# Patient Record
Sex: MALE | Race: BLACK OR AFRICAN AMERICAN | NOT HISPANIC OR LATINO | Employment: OTHER | URBAN - METROPOLITAN AREA
[De-identification: names, ages, dates, MRNs, and addresses within clinical notes are randomized per-mention and may not be internally consistent; named-entity substitution may affect disease eponyms.]

---

## 2017-07-11 ENCOUNTER — TELEPHONE (OUTPATIENT)
Dept: RHEUMATOLOGY | Facility: CLINIC | Age: 67
End: 2017-07-11

## 2017-07-11 DIAGNOSIS — I61.9 BRAIN BLEED: Primary | ICD-10-CM

## 2017-07-11 DIAGNOSIS — R41.3 MEMORY LOSS: ICD-10-CM

## 2017-07-18 ENCOUNTER — TELEPHONE (OUTPATIENT)
Dept: RHEUMATOLOGY | Facility: CLINIC | Age: 67
End: 2017-07-18

## 2017-07-18 ENCOUNTER — TELEPHONE (OUTPATIENT)
Dept: NEUROLOGY | Facility: CLINIC | Age: 67
End: 2017-07-18

## 2017-07-18 NOTE — TELEPHONE ENCOUNTER
----- Message from Josue Beckwith sent at 7/18/2017  9:36 AM CDT -----  Contact: Wife,Elvia Gan want to speak with a nurse regarding this week patient had stroke in May was unable to schedule appointment please call back at [phone]

## 2017-07-18 NOTE — TELEPHONE ENCOUNTER
----- Message from Josue Beckwith sent at 7/18/2017  9:37 AM CDT -----  Contact: Wife,Elvia Gan want to speak with a nurse regarding patient test results please call back at [phone] (home)

## 2017-07-27 ENCOUNTER — TELEPHONE (OUTPATIENT)
Dept: NEUROLOGY | Facility: CLINIC | Age: 67
End: 2017-07-27

## 2017-07-27 NOTE — TELEPHONE ENCOUNTER
Tried to call patient in regards to appointment scheduled with Dr. Root.     Dr. Root does not treat memory loss. Patient needs to be rescheduled with correct provider.

## 2017-07-28 NOTE — TELEPHONE ENCOUNTER
Tried to call patient. No answer, left message to call back in regards to his appointment. Dr. Root does not treat memory loss and needs to be rescheduled with correct provider.

## 2017-07-31 NOTE — TELEPHONE ENCOUNTER
Called and spoke with Patients wife, Elvia. Informed her that Dr. Root does not treat memory loss. That I could reschedule her  with Bernadette Granda who specializes in memory loss. Patient's wife verbalized understanding. Appointment rescheduled with Bernadette Granda.

## 2017-08-18 ENCOUNTER — HOSPITAL ENCOUNTER (INPATIENT)
Facility: HOSPITAL | Age: 67
LOS: 7 days | Discharge: NURSING, REHAB OR LONG-TERM CARE | DRG: 637 | End: 2017-08-25
Attending: INTERNAL MEDICINE | Admitting: INTERNAL MEDICINE
Payer: COMMERCIAL

## 2017-08-18 DIAGNOSIS — I42.9 CARDIOMYOPATHY: ICD-10-CM

## 2017-08-18 DIAGNOSIS — G93.40 ENCEPHALOPATHY: ICD-10-CM

## 2017-08-18 DIAGNOSIS — E11.10 DIABETIC KETOACIDOSIS: ICD-10-CM

## 2017-08-18 DIAGNOSIS — Z79.4 UNCONTROLLED TYPE 2 DIABETES MELLITUS WITH KETOACIDOSIS WITHOUT COMA, WITH LONG-TERM CURRENT USE OF INSULIN: Primary | ICD-10-CM

## 2017-08-18 DIAGNOSIS — E11.10 UNCONTROLLED TYPE 2 DIABETES MELLITUS WITH KETOACIDOSIS WITHOUT COMA, WITH LONG-TERM CURRENT USE OF INSULIN: Primary | ICD-10-CM

## 2017-08-18 DIAGNOSIS — G93.41 ACUTE METABOLIC ENCEPHALOPATHY: ICD-10-CM

## 2017-08-18 DIAGNOSIS — E11.10 DKA, TYPE 2: ICD-10-CM

## 2017-08-18 LAB
INR PPP: 1.1
PROTHROMBIN TIME: 11.3 SEC

## 2017-08-18 PROCEDURE — 84484 ASSAY OF TROPONIN QUANT: CPT

## 2017-08-18 PROCEDURE — 80053 COMPREHEN METABOLIC PANEL: CPT

## 2017-08-18 PROCEDURE — 85025 COMPLETE CBC W/AUTO DIFF WBC: CPT

## 2017-08-18 PROCEDURE — 83880 ASSAY OF NATRIURETIC PEPTIDE: CPT

## 2017-08-18 PROCEDURE — 83735 ASSAY OF MAGNESIUM: CPT

## 2017-08-18 PROCEDURE — 20000000 HC ICU ROOM

## 2017-08-18 PROCEDURE — 87205 SMEAR GRAM STAIN: CPT

## 2017-08-18 PROCEDURE — 81001 URINALYSIS AUTO W/SCOPE: CPT

## 2017-08-18 PROCEDURE — 83605 ASSAY OF LACTIC ACID: CPT

## 2017-08-18 PROCEDURE — 85610 PROTHROMBIN TIME: CPT

## 2017-08-18 PROCEDURE — 84100 ASSAY OF PHOSPHORUS: CPT

## 2017-08-18 PROCEDURE — 83036 HEMOGLOBIN GLYCOSYLATED A1C: CPT

## 2017-08-18 PROCEDURE — 87040 BLOOD CULTURE FOR BACTERIA: CPT

## 2017-08-18 PROCEDURE — 87086 URINE CULTURE/COLONY COUNT: CPT

## 2017-08-18 RX ORDER — HYDROCHLOROTHIAZIDE 25 MG/1
25 TABLET ORAL DAILY
Status: CANCELLED | OUTPATIENT
Start: 2017-08-19

## 2017-08-18 RX ORDER — PANTOPRAZOLE SODIUM 40 MG/1
40 TABLET, DELAYED RELEASE ORAL DAILY
Status: CANCELLED | OUTPATIENT
Start: 2017-08-19

## 2017-08-18 RX ORDER — SIMVASTATIN 10 MG/1
20 TABLET, FILM COATED ORAL NIGHTLY
Status: CANCELLED | OUTPATIENT
Start: 2017-08-18

## 2017-08-18 RX ORDER — ACETAMINOPHEN 325 MG/1
650 TABLET ORAL EVERY 4 HOURS PRN
Status: DISCONTINUED | OUTPATIENT
Start: 2017-08-19 | End: 2017-08-25 | Stop reason: HOSPADM

## 2017-08-18 RX ORDER — HEPARIN SODIUM 5000 [USP'U]/ML
5000 INJECTION, SOLUTION INTRAVENOUS; SUBCUTANEOUS EVERY 8 HOURS
Status: DISCONTINUED | OUTPATIENT
Start: 2017-08-19 | End: 2017-08-25 | Stop reason: HOSPADM

## 2017-08-18 RX ORDER — ONDANSETRON 8 MG/1
8 TABLET, ORALLY DISINTEGRATING ORAL EVERY 8 HOURS PRN
Status: DISCONTINUED | OUTPATIENT
Start: 2017-08-19 | End: 2017-08-22

## 2017-08-18 RX ORDER — LISINOPRIL 20 MG/1
20 TABLET ORAL DAILY
Status: CANCELLED | OUTPATIENT
Start: 2017-08-19

## 2017-08-18 RX ORDER — SODIUM CHLORIDE 0.9 % (FLUSH) 0.9 %
3 SYRINGE (ML) INJECTION EVERY 8 HOURS
Status: DISCONTINUED | OUTPATIENT
Start: 2017-08-19 | End: 2017-08-25 | Stop reason: HOSPADM

## 2017-08-19 PROBLEM — I25.10 CAD (CORONARY ARTERY DISEASE): Status: ACTIVE | Noted: 2017-08-19

## 2017-08-19 PROBLEM — E11.10 UNCONTROLLED TYPE 2 DIABETES MELLITUS WITH KETOACIDOSIS WITHOUT COMA, WITH LONG-TERM CURRENT USE OF INSULIN: Status: ACTIVE | Noted: 2017-08-19

## 2017-08-19 PROBLEM — N18.30 CKD (CHRONIC KIDNEY DISEASE) STAGE 3, GFR 30-59 ML/MIN: Status: ACTIVE | Noted: 2017-08-19

## 2017-08-19 PROBLEM — I50.42 CHRONIC COMBINED SYSTOLIC AND DIASTOLIC HEART FAILURE: Status: ACTIVE | Noted: 2017-08-19

## 2017-08-19 PROBLEM — G93.41 ACUTE METABOLIC ENCEPHALOPATHY: Status: ACTIVE | Noted: 2017-08-19

## 2017-08-19 PROBLEM — I50.22 CHRONIC SYSTOLIC HEART FAILURE: Status: ACTIVE | Noted: 2017-08-19

## 2017-08-19 PROBLEM — Z79.4 UNCONTROLLED TYPE 2 DIABETES MELLITUS WITH KETOACIDOSIS WITHOUT COMA, WITH LONG-TERM CURRENT USE OF INSULIN: Status: ACTIVE | Noted: 2017-08-19

## 2017-08-19 PROBLEM — D72.829 LEUKOCYTOSIS: Status: ACTIVE | Noted: 2017-08-19

## 2017-08-19 PROBLEM — E87.29 HIGH ANION GAP METABOLIC ACIDOSIS: Status: ACTIVE | Noted: 2017-08-19

## 2017-08-19 PROBLEM — I10 ESSENTIAL HYPERTENSION: Status: ACTIVE | Noted: 2017-08-19

## 2017-08-19 PROBLEM — R79.89 ELEVATED TROPONIN: Status: ACTIVE | Noted: 2017-08-19

## 2017-08-19 PROBLEM — F10.90 ALCOHOL INTAKE ABOVE RECOMMENDED SENSIBLE LIMITS: Status: ACTIVE | Noted: 2017-08-19

## 2017-08-19 PROBLEM — I61.2: Status: ACTIVE | Noted: 2017-08-19

## 2017-08-19 LAB
ALBUMIN SERPL BCP-MCNC: 1.9 G/DL
ALBUMIN SERPL BCP-MCNC: 2 G/DL
ALBUMIN SERPL BCP-MCNC: 2 G/DL
ALLENS TEST: ABNORMAL
ALP SERPL-CCNC: 112 U/L
ALP SERPL-CCNC: 128 U/L
ALP SERPL-CCNC: 133 U/L
ALT SERPL W/O P-5'-P-CCNC: 27 U/L
ALT SERPL W/O P-5'-P-CCNC: 30 U/L
ALT SERPL W/O P-5'-P-CCNC: 32 U/L
ANION GAP SERPL CALC-SCNC: 10 MMOL/L
ANION GAP SERPL CALC-SCNC: 10 MMOL/L
ANION GAP SERPL CALC-SCNC: 11 MMOL/L
ANION GAP SERPL CALC-SCNC: 11 MMOL/L
ANION GAP SERPL CALC-SCNC: 12 MMOL/L
ANION GAP SERPL CALC-SCNC: 15 MMOL/L
ANION GAP SERPL CALC-SCNC: 17 MMOL/L
ANION GAP SERPL CALC-SCNC: 17 MMOL/L
AST SERPL-CCNC: 23 U/L
AST SERPL-CCNC: 24 U/L
AST SERPL-CCNC: 28 U/L
B-OH-BUTYR BLD STRIP-SCNC: 1.3 MMOL/L
B-OH-BUTYR BLD STRIP-SCNC: 5.9 MMOL/L
BACTERIA #/AREA URNS AUTO: ABNORMAL /HPF
BASOPHILS # BLD AUTO: 0.03 K/UL
BASOPHILS # BLD AUTO: 0.04 K/UL
BASOPHILS NFR BLD: 0.2 %
BASOPHILS NFR BLD: 0.3 %
BILIRUB SERPL-MCNC: 0.3 MG/DL
BILIRUB UR QL STRIP: NEGATIVE
BNP SERPL-MCNC: 367 PG/ML
BUN SERPL-MCNC: 17 MG/DL
BUN SERPL-MCNC: 17 MG/DL
BUN SERPL-MCNC: 19 MG/DL
BUN SERPL-MCNC: 20 MG/DL
BUN SERPL-MCNC: 20 MG/DL
C PEPTIDE SERPL-MCNC: <0.08 NG/ML
CALCIUM SERPL-MCNC: 9 MG/DL
CALCIUM SERPL-MCNC: 9.1 MG/DL
CALCIUM SERPL-MCNC: 9.3 MG/DL
CALCIUM SERPL-MCNC: 9.3 MG/DL
CALCIUM SERPL-MCNC: 9.5 MG/DL
CALCIUM SERPL-MCNC: 9.6 MG/DL
CALCIUM SERPL-MCNC: 9.6 MG/DL
CALCIUM SERPL-MCNC: 9.7 MG/DL
CHLORIDE SERPL-SCNC: 115 MMOL/L
CHLORIDE SERPL-SCNC: 116 MMOL/L
CHLORIDE SERPL-SCNC: 116 MMOL/L
CHLORIDE SERPL-SCNC: 117 MMOL/L
CLARITY UR REFRACT.AUTO: ABNORMAL
CO2 SERPL-SCNC: 11 MMOL/L
CO2 SERPL-SCNC: 11 MMOL/L
CO2 SERPL-SCNC: 13 MMOL/L
CO2 SERPL-SCNC: 15 MMOL/L
CO2 SERPL-SCNC: 17 MMOL/L
COLOR UR AUTO: YELLOW
CORTIS SERPL-MCNC: 11.7 UG/DL
CREAT SERPL-MCNC: 1.6 MG/DL
CREAT SERPL-MCNC: 1.7 MG/DL
CREAT SERPL-MCNC: 1.8 MG/DL
CREAT SERPL-MCNC: 1.8 MG/DL
CREAT SERPL-MCNC: 1.9 MG/DL
CREAT SERPL-MCNC: 1.9 MG/DL
CREAT SERPL-MCNC: 2 MG/DL
CREAT SERPL-MCNC: 2 MG/DL
CREAT UR-MCNC: 47 MG/DL
DELSYS: ABNORMAL
DIFFERENTIAL METHOD: ABNORMAL
DIFFERENTIAL METHOD: ABNORMAL
EOSINOPHIL # BLD AUTO: 0.1 K/UL
EOSINOPHIL # BLD AUTO: 0.1 K/UL
EOSINOPHIL NFR BLD: 0.6 %
EOSINOPHIL NFR BLD: 0.9 %
ERYTHROCYTE [DISTWIDTH] IN BLOOD BY AUTOMATED COUNT: 14.5 %
ERYTHROCYTE [DISTWIDTH] IN BLOOD BY AUTOMATED COUNT: 14.6 %
EST. GFR  (AFRICAN AMERICAN): 38.8 ML/MIN/1.73 M^2
EST. GFR  (AFRICAN AMERICAN): 38.8 ML/MIN/1.73 M^2
EST. GFR  (AFRICAN AMERICAN): 41.3 ML/MIN/1.73 M^2
EST. GFR  (AFRICAN AMERICAN): 41.3 ML/MIN/1.73 M^2
EST. GFR  (AFRICAN AMERICAN): 44 ML/MIN/1.73 M^2
EST. GFR  (AFRICAN AMERICAN): 44 ML/MIN/1.73 M^2
EST. GFR  (AFRICAN AMERICAN): 47.2 ML/MIN/1.73 M^2
EST. GFR  (AFRICAN AMERICAN): 50.8 ML/MIN/1.73 M^2
EST. GFR  (NON AFRICAN AMERICAN): 33.5 ML/MIN/1.73 M^2
EST. GFR  (NON AFRICAN AMERICAN): 33.5 ML/MIN/1.73 M^2
EST. GFR  (NON AFRICAN AMERICAN): 35.7 ML/MIN/1.73 M^2
EST. GFR  (NON AFRICAN AMERICAN): 35.7 ML/MIN/1.73 M^2
EST. GFR  (NON AFRICAN AMERICAN): 38.1 ML/MIN/1.73 M^2
EST. GFR  (NON AFRICAN AMERICAN): 38.1 ML/MIN/1.73 M^2
EST. GFR  (NON AFRICAN AMERICAN): 40.8 ML/MIN/1.73 M^2
EST. GFR  (NON AFRICAN AMERICAN): 43.9 ML/MIN/1.73 M^2
ESTIMATED AVG GLUCOSE: 240 MG/DL
FIO2: 21
GLUCOSE SERPL-MCNC: 223 MG/DL
GLUCOSE SERPL-MCNC: 243 MG/DL
GLUCOSE SERPL-MCNC: 243 MG/DL
GLUCOSE SERPL-MCNC: 286 MG/DL
GLUCOSE SERPL-MCNC: 308 MG/DL
GLUCOSE SERPL-MCNC: 319 MG/DL
GLUCOSE SERPL-MCNC: 363 MG/DL
GLUCOSE SERPL-MCNC: 363 MG/DL
GLUCOSE UR QL STRIP: ABNORMAL
GRAM STN SPEC: NORMAL
GRAM STN SPEC: NORMAL
HBA1C MFR BLD HPLC: 10 %
HCO3 UR-SCNC: 14.8 MMOL/L (ref 24–28)
HCT VFR BLD AUTO: 35 %
HCT VFR BLD AUTO: 37.2 %
HGB BLD-MCNC: 11.7 G/DL
HGB BLD-MCNC: 12.5 G/DL
HGB UR QL STRIP: ABNORMAL
HYALINE CASTS UR QL AUTO: 0 /LPF
KETONES UR QL STRIP: ABNORMAL
LACTATE SERPL-SCNC: 1 MMOL/L
LACTATE SERPL-SCNC: 1 MMOL/L
LEUKOCYTE ESTERASE UR QL STRIP: NEGATIVE
LYMPHOCYTES # BLD AUTO: 1 K/UL
LYMPHOCYTES # BLD AUTO: 1.2 K/UL
LYMPHOCYTES NFR BLD: 7.2 %
LYMPHOCYTES NFR BLD: 8.3 %
MAGNESIUM SERPL-MCNC: 1.2 MG/DL
MAGNESIUM SERPL-MCNC: 1.7 MG/DL
MCH RBC QN AUTO: 28.9 PG
MCH RBC QN AUTO: 29.1 PG
MCHC RBC AUTO-ENTMCNC: 33.4 G/DL
MCHC RBC AUTO-ENTMCNC: 33.6 G/DL
MCV RBC AUTO: 86 FL
MCV RBC AUTO: 87 FL
MICROSCOPIC COMMENT: ABNORMAL
MODE: ABNORMAL
MONOCYTES # BLD AUTO: 0.6 K/UL
MONOCYTES # BLD AUTO: 1.1 K/UL
MONOCYTES NFR BLD: 4.6 %
MONOCYTES NFR BLD: 8.1 %
NEUTROPHILS # BLD AUTO: 11.5 K/UL
NEUTROPHILS # BLD AUTO: 12.1 K/UL
NEUTROPHILS NFR BLD: 81.6 %
NEUTROPHILS NFR BLD: 86.7 %
NITRITE UR QL STRIP: NEGATIVE
OSMOLALITY SERPL: 310 MOSM/KG
PCO2 BLDA: 24.3 MMHG (ref 35–45)
PH SMN: 7.39 [PH] (ref 7.35–7.45)
PH UR STRIP: 5 [PH] (ref 5–8)
PHOSPHATE SERPL-MCNC: 2.3 MG/DL
PHOSPHATE SERPL-MCNC: 2.9 MG/DL
PLATELET # BLD AUTO: 263 K/UL
PLATELET # BLD AUTO: 267 K/UL
PMV BLD AUTO: 10.3 FL
PMV BLD AUTO: 10.4 FL
PO2 BLDA: 42 MMHG (ref 40–60)
POC BE: -10 MMOL/L
POC SATURATED O2: 79 % (ref 95–100)
POC TCO2: 16 MMOL/L (ref 24–29)
POCT GLUCOSE: 318 MG/DL (ref 70–110)
POCT GLUCOSE: 331 MG/DL (ref 70–110)
POCT GLUCOSE: 343 MG/DL (ref 70–110)
POCT GLUCOSE: 378 MG/DL (ref 70–110)
POCT GLUCOSE: 382 MG/DL (ref 70–110)
POCT GLUCOSE: 407 MG/DL (ref 70–110)
POCT GLUCOSE: 411 MG/DL (ref 70–110)
POCT GLUCOSE: 432 MG/DL (ref 70–110)
POTASSIUM SERPL-SCNC: 3.6 MMOL/L
POTASSIUM SERPL-SCNC: 3.7 MMOL/L
POTASSIUM SERPL-SCNC: 4.1 MMOL/L
POTASSIUM SERPL-SCNC: 4.1 MMOL/L
POTASSIUM SERPL-SCNC: 4.4 MMOL/L
POTASSIUM SERPL-SCNC: 4.5 MMOL/L
POTASSIUM SERPL-SCNC: 4.9 MMOL/L
POTASSIUM SERPL-SCNC: 4.9 MMOL/L
PROT SERPL-MCNC: 6.2 G/DL
PROT SERPL-MCNC: 6.7 G/DL
PROT SERPL-MCNC: 6.8 G/DL
PROT UR QL STRIP: ABNORMAL
RBC # BLD AUTO: 4.05 M/UL
RBC # BLD AUTO: 4.29 M/UL
RBC #/AREA URNS AUTO: >100 /HPF (ref 0–4)
SAMPLE: ABNORMAL
SITE: ABNORMAL
SODIUM SERPL-SCNC: 142 MMOL/L
SODIUM SERPL-SCNC: 143 MMOL/L
SODIUM SERPL-SCNC: 143 MMOL/L
SODIUM SERPL-SCNC: 144 MMOL/L
SODIUM SERPL-SCNC: 145 MMOL/L
SODIUM UR-SCNC: 144 MMOL/L
SP GR UR STRIP: 1.01 (ref 1–1.03)
SP02: 97
TROPONIN I SERPL DL<=0.01 NG/ML-MCNC: 0.63 NG/ML
TROPONIN I SERPL DL<=0.01 NG/ML-MCNC: 0.64 NG/ML
TSH SERPL DL<=0.005 MIU/L-ACNC: 2.13 UIU/ML
URN SPEC COLLECT METH UR: ABNORMAL
UROBILINOGEN UR STRIP-ACNC: NEGATIVE EU/DL
UUN UR-MCNC: 328 MG/DL
VIT B12 SERPL-MCNC: 1454 PG/ML
WBC # BLD AUTO: 13.9 K/UL
WBC # BLD AUTO: 14.04 K/UL
WBC #/AREA URNS AUTO: 1 /HPF (ref 0–5)
YEAST UR QL AUTO: ABNORMAL

## 2017-08-19 PROCEDURE — 82010 KETONE BODYS QUAN: CPT

## 2017-08-19 PROCEDURE — 80048 BASIC METABOLIC PNL TOTAL CA: CPT

## 2017-08-19 PROCEDURE — 80048 BASIC METABOLIC PNL TOTAL CA: CPT | Mod: 91

## 2017-08-19 PROCEDURE — 63600175 PHARM REV CODE 636 W HCPCS: Performed by: INTERNAL MEDICINE

## 2017-08-19 PROCEDURE — 20000000 HC ICU ROOM

## 2017-08-19 PROCEDURE — 95819 EEG AWAKE AND ASLEEP: CPT | Mod: 26,,, | Performed by: PSYCHIATRY & NEUROLOGY

## 2017-08-19 PROCEDURE — 82607 VITAMIN B-12: CPT

## 2017-08-19 PROCEDURE — 99223 1ST HOSP IP/OBS HIGH 75: CPT | Mod: ,,, | Performed by: INTERNAL MEDICINE

## 2017-08-19 PROCEDURE — 84443 ASSAY THYROID STIM HORMONE: CPT

## 2017-08-19 PROCEDURE — 80053 COMPREHEN METABOLIC PANEL: CPT

## 2017-08-19 PROCEDURE — 84425 ASSAY OF VITAMIN B-1: CPT

## 2017-08-19 PROCEDURE — 86592 SYPHILIS TEST NON-TREP QUAL: CPT

## 2017-08-19 PROCEDURE — 93005 ELECTROCARDIOGRAM TRACING: CPT

## 2017-08-19 PROCEDURE — 84681 ASSAY OF C-PEPTIDE: CPT

## 2017-08-19 PROCEDURE — 93010 ELECTROCARDIOGRAM REPORT: CPT | Mod: ,,, | Performed by: INTERNAL MEDICINE

## 2017-08-19 PROCEDURE — 84484 ASSAY OF TROPONIN QUANT: CPT

## 2017-08-19 PROCEDURE — 84300 ASSAY OF URINE SODIUM: CPT

## 2017-08-19 PROCEDURE — 94760 N-INVAS EAR/PLS OXIMETRY 1: CPT

## 2017-08-19 PROCEDURE — 95819 EEG AWAKE AND ASLEEP: CPT

## 2017-08-19 PROCEDURE — 25000003 PHARM REV CODE 250: Performed by: STUDENT IN AN ORGANIZED HEALTH CARE EDUCATION/TRAINING PROGRAM

## 2017-08-19 PROCEDURE — A4216 STERILE WATER/SALINE, 10 ML: HCPCS | Performed by: INTERNAL MEDICINE

## 2017-08-19 PROCEDURE — 82010 KETONE BODYS QUAN: CPT | Mod: 91

## 2017-08-19 PROCEDURE — 99900035 HC TECH TIME PER 15 MIN (STAT)

## 2017-08-19 PROCEDURE — 25000003 PHARM REV CODE 250: Performed by: INTERNAL MEDICINE

## 2017-08-19 PROCEDURE — 82943 ASSAY OF GLUCAGON: CPT

## 2017-08-19 PROCEDURE — 85347 COAGULATION TIME ACTIVATED: CPT

## 2017-08-19 PROCEDURE — 80053 COMPREHEN METABOLIC PANEL: CPT | Mod: 91

## 2017-08-19 PROCEDURE — 86341 ISLET CELL ANTIBODY: CPT

## 2017-08-19 PROCEDURE — S5010 5% DEXTROSE AND 0.45% SALINE: HCPCS | Performed by: STUDENT IN AN ORGANIZED HEALTH CARE EDUCATION/TRAINING PROGRAM

## 2017-08-19 PROCEDURE — 99223 1ST HOSP IP/OBS HIGH 75: CPT | Mod: ,,, | Performed by: PSYCHIATRY & NEUROLOGY

## 2017-08-19 PROCEDURE — 87040 BLOOD CULTURE FOR BACTERIA: CPT

## 2017-08-19 PROCEDURE — 82533 TOTAL CORTISOL: CPT

## 2017-08-19 PROCEDURE — 83735 ASSAY OF MAGNESIUM: CPT

## 2017-08-19 PROCEDURE — 85025 COMPLETE CBC W/AUTO DIFF WBC: CPT

## 2017-08-19 PROCEDURE — 84540 ASSAY OF URINE/UREA-N: CPT

## 2017-08-19 PROCEDURE — 83930 ASSAY OF BLOOD OSMOLALITY: CPT

## 2017-08-19 PROCEDURE — 82570 ASSAY OF URINE CREATININE: CPT

## 2017-08-19 PROCEDURE — 36415 COLL VENOUS BLD VENIPUNCTURE: CPT

## 2017-08-19 PROCEDURE — 80307 DRUG TEST PRSMV CHEM ANLYZR: CPT

## 2017-08-19 PROCEDURE — 97802 MEDICAL NUTRITION INDIV IN: CPT

## 2017-08-19 PROCEDURE — 84100 ASSAY OF PHOSPHORUS: CPT

## 2017-08-19 RX ORDER — ATORVASTATIN CALCIUM 10 MG/1
40 TABLET, FILM COATED ORAL DAILY
Status: DISCONTINUED | OUTPATIENT
Start: 2017-08-19 | End: 2017-08-25 | Stop reason: HOSPADM

## 2017-08-19 RX ORDER — METOPROLOL TARTRATE 25 MG/1
50 TABLET, FILM COATED ORAL 2 TIMES DAILY
Status: DISCONTINUED | OUTPATIENT
Start: 2017-08-19 | End: 2017-08-22

## 2017-08-19 RX ORDER — POTASSIUM CHLORIDE 7.45 MG/ML
10 INJECTION INTRAVENOUS ONCE
Status: COMPLETED | OUTPATIENT
Start: 2017-08-19 | End: 2017-08-19

## 2017-08-19 RX ORDER — DEXTROSE MONOHYDRATE AND SODIUM CHLORIDE 5; .45 G/100ML; G/100ML
INJECTION, SOLUTION INTRAVENOUS CONTINUOUS
Status: DISCONTINUED | OUTPATIENT
Start: 2017-08-19 | End: 2017-08-19

## 2017-08-19 RX ORDER — MAGNESIUM SULFATE HEPTAHYDRATE 40 MG/ML
2 INJECTION, SOLUTION INTRAVENOUS
Status: COMPLETED | OUTPATIENT
Start: 2017-08-19 | End: 2017-08-19

## 2017-08-19 RX ORDER — THIAMINE HCL 100 MG
100 TABLET ORAL DAILY
Status: DISCONTINUED | OUTPATIENT
Start: 2017-08-19 | End: 2017-08-25 | Stop reason: HOSPADM

## 2017-08-19 RX ORDER — DEXTROSE MONOHYDRATE 100 MG/ML
1000 INJECTION, SOLUTION INTRAVENOUS
Status: DISCONTINUED | OUTPATIENT
Start: 2017-08-19 | End: 2017-08-20

## 2017-08-19 RX ORDER — DEXTROSE MONOHYDRATE, SODIUM CHLORIDE, AND POTASSIUM CHLORIDE 50; 1.49; 4.5 G/1000ML; G/1000ML; G/1000ML
INJECTION, SOLUTION INTRAVENOUS CONTINUOUS
Status: DISCONTINUED | OUTPATIENT
Start: 2017-08-19 | End: 2017-08-20

## 2017-08-19 RX ORDER — LABETALOL HYDROCHLORIDE 5 MG/ML
10 INJECTION, SOLUTION INTRAVENOUS EVERY 4 HOURS PRN
Status: DISCONTINUED | OUTPATIENT
Start: 2017-08-19 | End: 2017-08-22

## 2017-08-19 RX ORDER — INSULIN ASPART 100 [IU]/ML
0-5 INJECTION, SOLUTION INTRAVENOUS; SUBCUTANEOUS EVERY 6 HOURS PRN
Status: DISCONTINUED | OUTPATIENT
Start: 2017-08-19 | End: 2017-08-19

## 2017-08-19 RX ORDER — CLOPIDOGREL BISULFATE 75 MG/1
75 TABLET ORAL DAILY
Status: DISCONTINUED | OUTPATIENT
Start: 2017-08-19 | End: 2017-08-25 | Stop reason: HOSPADM

## 2017-08-19 RX ORDER — GLUCAGON 1 MG
1 KIT INJECTION
Status: DISCONTINUED | OUTPATIENT
Start: 2017-08-19 | End: 2017-08-20

## 2017-08-19 RX ORDER — METOPROLOL SUCCINATE 50 MG/1
100 TABLET, EXTENDED RELEASE ORAL DAILY
Status: DISCONTINUED | OUTPATIENT
Start: 2017-08-19 | End: 2017-08-19

## 2017-08-19 RX ORDER — SODIUM CHLORIDE 450 MG/100ML
INJECTION, SOLUTION INTRAVENOUS CONTINUOUS
Status: DISCONTINUED | OUTPATIENT
Start: 2017-08-19 | End: 2017-08-19

## 2017-08-19 RX ADMIN — Medication 3 ML: at 06:08

## 2017-08-19 RX ADMIN — CLOPIDOGREL 75 MG: 75 TABLET, FILM COATED ORAL at 09:08

## 2017-08-19 RX ADMIN — ATORVASTATIN CALCIUM 40 MG: 10 TABLET, FILM COATED ORAL at 09:08

## 2017-08-19 RX ADMIN — HEPARIN SODIUM 5000 UNITS: 5000 INJECTION, SOLUTION INTRAVENOUS; SUBCUTANEOUS at 10:08

## 2017-08-19 RX ADMIN — LABETALOL HYDROCHLORIDE 10 MG: 5 INJECTION, SOLUTION INTRAVENOUS at 01:08

## 2017-08-19 RX ADMIN — HEPARIN SODIUM 5000 UNITS: 5000 INJECTION, SOLUTION INTRAVENOUS; SUBCUTANEOUS at 06:08

## 2017-08-19 RX ADMIN — Medication 3 ML: at 02:08

## 2017-08-19 RX ADMIN — POTASSIUM CHLORIDE 10 MEQ: 10 INJECTION, SOLUTION INTRAVENOUS at 08:08

## 2017-08-19 RX ADMIN — Medication 3 ML: at 10:08

## 2017-08-19 RX ADMIN — DEXTROSE MONOHYDRATE, SODIUM CHLORIDE, AND POTASSIUM CHLORIDE: 50; 4.5; 1.49 INJECTION, SOLUTION INTRAVENOUS at 08:08

## 2017-08-19 RX ADMIN — METOPROLOL TARTRATE 50 MG: 50 TABLET, FILM COATED ORAL at 08:08

## 2017-08-19 RX ADMIN — Medication 100 MG: at 09:08

## 2017-08-19 RX ADMIN — MAGNESIUM SULFATE IN WATER 2 G: 40 INJECTION, SOLUTION INTRAVENOUS at 03:08

## 2017-08-19 RX ADMIN — MAGNESIUM SULFATE IN WATER 2 G: 40 INJECTION, SOLUTION INTRAVENOUS at 06:08

## 2017-08-19 RX ADMIN — SODIUM CHLORIDE 1 UNITS/HR: 9 INJECTION, SOLUTION INTRAVENOUS at 02:08

## 2017-08-19 RX ADMIN — INSULIN ASPART 2 UNITS: 100 INJECTION, SOLUTION INTRAVENOUS; SUBCUTANEOUS at 12:08

## 2017-08-19 RX ADMIN — SODIUM CHLORIDE 500 ML: 0.9 INJECTION, SOLUTION INTRAVENOUS at 02:08

## 2017-08-19 RX ADMIN — METOPROLOL TARTRATE 50 MG: 50 TABLET, FILM COATED ORAL at 09:08

## 2017-08-19 RX ADMIN — LABETALOL HYDROCHLORIDE 10 MG: 5 INJECTION, SOLUTION INTRAVENOUS at 06:08

## 2017-08-19 RX ADMIN — DEXTROSE AND SODIUM CHLORIDE: 5; .45 INJECTION, SOLUTION INTRAVENOUS at 12:08

## 2017-08-19 RX ADMIN — SODIUM CHLORIDE: 0.45 INJECTION, SOLUTION INTRAVENOUS at 02:08

## 2017-08-19 RX ADMIN — HEPARIN SODIUM 5000 UNITS: 5000 INJECTION, SOLUTION INTRAVENOUS; SUBCUTANEOUS at 03:08

## 2017-08-19 NOTE — ASSESSMENT & PLAN NOTE
-Per report Type II; however, unable to obtain collateral and possibly Type I  -Would suspect chronic diagnosis given AKA, CAD, possible CKD  -C-peptide undetectable with BS in 400s  -Agree with sending KATELIN Ab  -Anion gap improving, corrected anion gap borderline at 17 this AM but remains acidotic, would consider additional concurrent non-gap acidosis (EILEEN vs RTA4)  -Will continue to try to get collateral from family regarding history  -Recommend continuing intensive insulin gtt at this time

## 2017-08-19 NOTE — PLAN OF CARE
Problem: Patient Care Overview  Goal: Plan of Care Review  Outcome: Ongoing (interventions implemented as appropriate)  1. TF recommendations: Glucerna 1.5 @ 50 mL/hr to provide 1800 calories, 99 g of protein, 911 mL fluid.   2. Diet recommendations: 1800 kcal ADA diet, texture per SLP.   3. Will adjust TF goal rate based on PO intake.   4. RD to monitor & follow-up.

## 2017-08-19 NOTE — CONSULTS
"  [facility]  Adult Nutrition  Consult Note    SUMMARY     Recommendations    1. TF recommendations: Glucerna 1.5 @ 50 mL/hr to provide 1800 calories, 99 g of protein, 911 mL fluid.   2. Diet recommendations: 1800 kcal ADA diet, texture per SLP.   3. Will adjust TF goal rate based on PO intake.   4. RD to monitor & follow-up.    Goals: Meet % EEN, EPN  Nutrition Goal Status: new  Communication of RD Recs: reviewed with RN    Reason for Assessment    Reason for Assessment: physician consult  Diagnosis: other (see comments) (Metabolic encephalopathy)  Relevent Medical History: DM, HTN, CHF, BKA   Interdisciplinary Rounds: did not attend     General Information Comments: Pt agitated, per RN notes. PEG placed 8/17 to "force caloric intake."  Nutrition Discharge Planning: Adequate nutrition    Nutrition Prescription Ordered    Current Diet Order: NPO    Evaluation of Received Nutrients/Fluid Intake    IV Fluid (mL): 3600    Nutrition/Diet History    Patient Reported Diet/Restrictions/Preferences: other (see comments) (MICHELLE)     Factors Affecting Nutritional Intake: NPO, behavioral (mealtime)    Labs/Tests/Procedures/Meds    Pertinent Labs Reviewed: reviewed, pertinent  Pertinent Labs Comments: Creat 2.0, GFR 33.5, Gluc 363, A1C 10.0  Pertinent Medications Reviewed: reviewed, pertinent  Pertinent Medications Comments: Statin, Insulin, IVF    Physical Findings    Overall Physical Appearance: nourished  Tubes: gastrostomy tube  Skin: incision    Anthropometrics    Temp: 98 °F (36.7 °C)     Height: 5' 7" (170.2 cm)     Weight: 62.8 kg (138 lb 7.2 oz)  Ideal Body Weight (IBW), Male: 148 lb     % Ideal Body Weight, Male (lb): 93.55 lb     BMI (Calculated): 21.7  BMI Grade: 18.5-24.9 - normal     Usual Body Weight (UBW), kg:  (MICHELLE)     Total Amputation %: 5.9  Amputee BMI (kg/m2): 23.11 kg/m2    Estimated/Assessed Needs    Weight Used For Calorie Calculations: 62.8 kg (138 lb 7.2 oz)      Energy Calorie " Requirements (kcal): 7009-0822 kcal/d  Energy Need Method: [place]     Weight Used For Protein Calculations: 62.8 kg (138 lb 7.2 oz)  Protein Requirements: 76-94 g/d     Fluid Need Method: RDA Method, other (see comments) (Per MD or 1 mL/kcal)     CHO Requirement: 50% total kcals     Assessment and Plan    Inadequate energy intake r/t inability to consume sufficient energy AEB NPO with no alternate means of nutrition.  Status: New    Monitor and Evaluation    Food and Nutrient Intake: energy intake, food and beverage intake, enteral nutrition intake  Food and Nutrient Adminstration: enteral and parenteral nutrition administration, diet order     Physical Activity and Function: nutrition-related ADLs and IADLs  Anthropometric Measurements: weight, weight change, body mass index  Biochemical Data, Medical Tests and Procedures: lipid profile, inflammatory profile, glucose/endocrine profile, gastrointestinal profile, electrolyte and renal panel  Nutrition-Focused Physical Findings: overall appearance    Nutrition Risk    Level of Risk: other (see comments) (2x/week)    Nutrition Follow-Up    RD Follow-up?: Yes

## 2017-08-19 NOTE — CONSULTS
"[facility]  Neurology  Consult Note    Patient Name: Brandon Simmons  MRN: [id number]  Admission Date: 8/18/2017  Hospital Length of Stay: 1 days  Code Status: Full Code   Attending Provider: Linda Mercado MD   Consulting Provider: Erasto Lucero MD  Primary Care Physician: Neville Willis MD  Principal Problem:Acute metabolic encephalopathy    Inpatient consult to Neurology  Consult performed by: ERASTO LUCERO  Consult ordered by: CUATE STRANGE         Subjective:     Chief Complaint:  Altered mental status     HPI:   66 yo M with PMHx of CAD s/p CABG, PVD with right BKA, CKD III, HTN, poorly controlled DM type II, and alcohol abuse transferred 08/18/17 from [facility] after presenting to their ED on 8/8/2017 after wife noticed acute personality changes and confusion.  She denied a strong EtOH use history and patient is reported to have only 14 beers/wk per chart but he also received chlordiazepoxide at OSH.  When in OSH ED, he was found to have DKA which was difficult to treat. He had a CT head there which showed stable changes of L temporal hemorrhage previously identified 05/12/17--"linear hyperdensity in the medial left temporal lobe and ex vacuo dilatation of the temporal horn of the left lateral ventricle were unchanged."  He also had an EEG 08/14/17 showing generalized slowing consistent with a toxic-metabolic encephalopathy rather than seizure or NCSE.  Thiamine was not checked at OSH per records, though patient was receiving thiamine 100 mg qd at OSH and here.  Patient began refusing meals at OSH and had PEG tube placement 08/17/17.  He remained confused with labile glucose and was transferred to [facility] 08/18/17.  Neurology is consulted today for continued confusion.  Currently patient has a mild leukocytosis to 13.90 k/uL with CXR noting bilateral pleural effusions and abnormal opacification in L middle/lower lobes.  UA largely " unremarkable for infection with 1 WBC, rare bacteria, and negative nitrite/leukocyte esterase.  Blood cxs, urine cx pending.  VSS with Tmax 99.6 F.  This am, patient is somnolent but able to follow commands and responds to some simple questions.  He demonstrates no signs of nuchal rigidity and family recalls no recent head trauma apart from 05/2017 with the corresponding temporal hemorrhage on CT head.  Remainder OSH records below per 'labs/imaging' section.     Past Medical History:   Diagnosis Date    Anticoagulant long-term use     Arthritis     Blood transfusion     CHF (congestive heart failure)     Coronary artery disease     Diabetes mellitus     Hypertension     Renal disorder      Past Surgical History:   Procedure Laterality Date    CARDIAC SURGERY      COLONOSCOPY N/A 10/16/2016    Procedure: COLONOSCOPY;  Surgeon: Jamar White MD;  Location: [facility];  Service: Endoscopy;  Laterality: N/A;    CORONARY ARTERY BYPASS GRAFT  2000    LEG AMPUTATION THROUGH LOWER TIBIA AND FIBULA      right leg - prosthesis in place    stents  left leg     Review of patient's allergies indicates:   Allergen Reactions    Budesonide Rash    [place] Rash     Current Neurological Medications:   Amitriptyline 25 mg po qHS  Clopidogrel 75 mg po qd  Gabapentin 600 mg po tid  Simvastatin 20 mg po qHS    No current facility-administered medications on file prior to encounter.      Current Outpatient Prescriptions on File Prior to Encounter   Medication Sig    amitriptyline (ELAVIL) 25 MG tablet Take 25 mg by mouth nightly as needed.      amlodipine (NORVASC) 10 MG tablet Take 15 mg by mouth once daily.      aspirin 325 MG tablet Take 325 mg by mouth once daily.      clonidine (CATAPRES) 0.1 MG tablet Take 0.1 mg by mouth 2 (two) times daily.      clopidogrel (PLAVIX) 75 mg tablet Take 75 mg by mouth once daily.      CONTOUR NEXT STRIPS Strp test SIX TIMES DAILY    furosemide (LASIX) 40 MG tablet once  daily.     gabapentin (NEURONTIN) 600 MG tablet Take 600 mg by mouth 3 (three) times daily.      hydrochlorothiazide (HYDRODIURIL) 25 MG tablet Take 25 mg by mouth once daily.      isosorbide mononitrate (IMDUR) 30 MG 24 hr tablet Take 30 mg by mouth once daily.      lansoprazole (PREVACID) 30 MG capsule Take 30 mg by mouth once daily.      lisinopril (PRINIVIL,ZESTRIL) 20 MG tablet Take 1 tablet (20 mg total) by mouth once daily.    metoprolol succinate (TOPROL-XL) 100 MG 24 hr tablet Take 100 mg by mouth 2 (two) times daily.      metoprolol tartrate (LOPRESSOR) 100 MG tablet Take 100 mg by mouth 2 (two) times daily.    NOVOLOG 100 unit/mL injection     omeprazole (PRILOSEC) 20 MG capsule     pantoprazole (PROTONIX) 40 MG tablet Take 40 mg by mouth once daily.    pyridoxine, vitamin B6, (VITAMIN B-6) 100 MG Tab Take 50 mg by mouth once daily.    ranolazine (RANEXA) 500 MG Tb12 Take 500 mg by mouth 2 (two) times daily.      simvastatin (ZOCOR) 20 MG tablet Take 20 mg by mouth every evening.      sucralfate (CARAFATE) 1 gram tablet TAKE 1 TABLET BY MOUTH FOUR TIMES DAILY BEFORE MEALS and AT BEDTIME    vitamin D 1000 units Tab Take 185 mg by mouth once daily.     Family History     Problem Relation (Age of Onset)    Asthma Mother    COPD Mother    Diabetes Sister, Brother    Heart disease Sister    Hypertension Father, Sister    Kidney disease Brother    No Known Problems Brother, Brother, Sister    Stroke Father        Social History Main Topics    Smoking status: Heavy Tobacco Smoker     Packs/day: 1.00     Years: 46.00     Types: Cigarettes     Last attempt to quit: 11/1/2012    Smokeless tobacco: Not on file    Alcohol use Yes      Comment: 1 drink per week    Drug use: No    Sexual activity: Not on file     Review of Systems   Unable to perform ROS: Mental status change     Objective:     Vital Signs (Most Recent):  Temp: 98.4 °F (36.9 °C) (08/19/17 1100)  Pulse: 74 (08/19/17 1200)  Resp: 19  (08/19/17 1200)  BP: (!) 177/77 (08/19/17 1200)  SpO2: 95 % (08/19/17 1200) Vital Signs (24h Range):  Temp:  [98 °F (36.7 °C)-99.6 °F (37.6 °C)] 98.4 °F (36.9 °C)  Pulse:  [] 74  Resp:  [18-28] 19  SpO2:  [94 %-100 %] 95 %  BP: (154-199)/(70-92) 177/77     Weight: 62.8 kg (138 lb 7.2 oz)  Body mass index is 21.68 kg/m².    Physical Exam  General:  Well-developed, well-nourished, nad--somnolent with eyes opening only to voice/tactile stimuli  HEENT:  NCAT, PERRL, EOMI.  Oropharyngeal membranes non-erythematous/without exudate  Neck:  Appears supple without obvious rigidity.  No palpable lad.  Resp:  Symmetric expansion, no increased wob  CVS:  RRR, no m/r/g.  No LE edema.  Signs of venous stasis on LLE.  GI:  Abd soft, non-distended, non-tender, +BS  Neurologic:  Mental Status:  Awake but somnolent with eye opening only to voice/tactile stimuli.  Knows his name, able to answer a few other simple Y/N questions.  Follows simple commands, but inconsistently (requires repeated prompting).  Cranial Nerves:  Blinks to threat bilaterally.  PERRL, EOMI.  Facial movements appear to be intact, unable to reliably test sensation but patient able to clench jaw with palpable masseter.  Tongue protrudes midline, unable to evaluate palate raise.  SCM and trapezius movements visualized but patient not following commands to resist.  Motor:  Mild increase in tone in BUE and LLE noted.  Bulk wnl.  Not following complex commands, but moving all extremities.  Has 5/5  strength.  Sensory:  Unable to test reliably but withdraws to pain and grimaces with noxious stimuli at all extremities.  Reflexes:  Biceps, brachioradialis, pectoralis, and patellar reflexes brisk and 2+.  Triple flexion reflex on Babinski to LLE.  2-3 beats of ankle clonus at LLE.  Coordination:  No resting tremor or apparent myoclonus.  Unable to evaluate further 2/2 mental status.  Gait:  Deferred 2/2 mental status     Significant Labs:   Recent Labs  Lab  "08/19/17  0437   WBC 13.90*   RBC 4.29*   HGB 12.5*   HCT 37.2*      MCV 87   MCH 29.1   MCHC 33.6     Recent Labs  Lab 08/19/17  1130   CALCIUM 9.3  9.3   PROT 6.2     143   K 4.1  4.1   CO2 17*  17*   *  115*   BUN 19  19   CREATININE 1.8*  1.8*   ALKPHOS 112   ALT 27   AST 23   BILITOT 0.3     Recent Labs  Lab 08/19/17  0440   PH 7.394   PCO2 24.3*   PO2 42   HCO3 14.8*   POCSATURATED 79*   BE -10     Recent Labs  Lab 08/19/17  0730   TROPONINI 0.635*     Recent Labs  Lab 08/18/17  2329   COLORU Yellow   SPECGRAV 1.015   PHUR 5.0   PROTEINUA 2+*   BACTERIA Rare     Significant Imaging:   ([facility]OSH Records):  08/15/17 CXR:  There is cephalization of blood flow and increased interstitial markings highly suspicious for pulmonary edema. There are increased markings in the lung bases left greater than right cannot rule out a small   left-sided effusion and bilateral atelectasis.    08/14/17 EEG:  The EEG recording showed moderate-to-marked nonspecific abnormalities of a generalized nature which may be seen with diffuse CNS disturbances including toxic-metabolic disorders and degenerative CNS disease but which could also be due in part to drowsiness.  There were no definite lateralizing features.  No definite epileptiform abnormalities were noted either.  No "PLEDs" (periodic lateralized epileptiform discharges) were noted in the tracing, which would tend to make focal encephalitis (such as herpes encephalitis) less likely.  Further clinical-radiologic correlation such as with CT or MRI scan of brain if feasible may be of additional value if underlying structural CNS lesions are clinically suspected.  Absence of epileptiform abnormalities in the EEG tracing, however, does not completely exclude the possibility of a seizure disorder, although the present tracing did not show evidence of any subclinical "nonconvulsive seizures" throughout the recording.  If symptoms should progress, " followup EEG studies at a future date to detect any interval changes may be of additional value.  Clinical correlation is recommended.    08/14/17 CT head:  Findings:  This study was degraded by motion artifact.  The linear hyperdensity in the medial left temporal lobe   (sequence 2, image 15) is unchanged. Ex vacuo dilatation of the temporal horn of the left lateral   ventricle is unchanged. There is no acute brain parenchymal abnormality. There is mild volume loss.   Ventricles and sulci are otherwise unremarkable. Periventricular and supraventricular white matter low   densities are most consistent with chronic microvascular ischemia. These could obscure a focus of   superimposed acute ischemia. There is intracranial calcific atherosclerosis. There is no new hemorrhage.   Visualized paranasal sinuses and mastoid air cells are clear.    08/14/17 US Carotid: Impressions                                                                                                                * Right internal carotid has <50% stenosis                              * Left internal carotid has <50% stenosis                               * Vertebral arteries are antegrade bilaterally      08/14/17 Echo  * Study quality: Technically difficult                                  * The estimated left ventricle ejection fraction is 45-50% (abnormal)   * Diastolic function is abnormal (Grade 2)                              * There is moderate aortic regurgitation      Assessment and Plan:     * Acute metabolic encephalopathy    -Ddx:  Metabolic encephalopathy, toxic encephalopathy, infectious encephalopathy, stroke/structural brain injury, NCSE/post-ictal state, Wernicke encephalopathy, Hepatic encephalopathy  -Metabolic: glucose continues to be uncontrolled, agree with Endocrine input.  B12, TSH wnl.  -Toxic: no UDS. Unlikely given continued encephalopathy past typical intoxication/withdrawal time periods  -Infectious:  CXR with possible  LML/LLL opacity, +leukocytosis, +Tmax 99.6 F.  UA ~ wnl.  Blood cxs, U cxs pending.  Consider resp cx, trend WBCs and watch VS to determine need for abx.  -Stroke: no new stroke per most recent CT head, no focal deficits on exam.  Agree with MRI brain to confirm.  -NCSE/post-ictal: unlikely given presentation this am, but has risk factor for seizure given recent temporal hemorrhage 05/2017.  Will follow up EEG results.  -Wernicke: Consider checking B1 level.  Patient has been receiving thiamine 100 mg po qd, continue.   -Hepatic encephalopathy:  Ammonia wnl at OSH.  Presentation not consistent w/ hepatic encephalopathy.  No asterixis, LFTs wnl.  Will evaluate MRI brain for any findings c/w long-term hepatic disease.  -Additional:  +delirium precautions, limit sedating medications, consider PT/OT as patient is able to cooperate     VTE Risk Mitigation         Ordered     heparin (porcine) injection 5,000 Units  Every 8 hours     Route:  Subcutaneous        08/18/17 2315     High Risk of VTE  Once      08/18/17 2308     Place sequential compression device  Until discontinued      08/18/17 2308        Thank you for your consult. I will follow-up with patient. Please contact us if you have any additional questions.    Josefina Shelby MD  Neurology  [facility]

## 2017-08-19 NOTE — ASSESSMENT & PLAN NOTE
Hx of CABG  2D echo with EF 45-50% with diastolic dysfunction  Continue plavix and ToprolXL   Continue

## 2017-08-19 NOTE — ASSESSMENT & PLAN NOTE
-Ddx:  Metabolic encephalopathy, toxic encephalopathy, infectious encephalopathy, stroke/structural brain injury, NCSE/post-ictal state, Wernicke encephalopathy, Hepatic encephalopathy  -Metabolic: glucose continues to be uncontrolled, agree with Endocrine input.  B12, TSH wnl.  -Toxic: no UDS per OSH records.  Unlikely given continued encephalopathy past typical intoxication and withdrawal time periods  -Infectious:  CXR with possible LML/LLL opacity, +leukocytosis, +Tmax 99.6 F.  UA ~ wnl.  Blood cxs, U cxs pending.  Consider resp cx, trend WBCs and watch VS to determine need for abx.  -Stroke: no new stroke per most recent CT head, no focal deficits on exam.  Agree with MRI brain to confirm.  -NCSE/post-ictal: unlikely given presentation this am, but has risk factor for seizure given recent temporal hemorrhage 05/2017.  Will follow up EEG results.  -Wernicke: Consider checking B1 level.  Patient has been receiving thiamine 100 mg po qd, continue.   -Hepatic encephalopathy:  Ammonia wnl at OSH.  Presentation not consistent w/ hepatic encephalopathy.  No asterixis, LFTs wnl.  -Additional:  +delirium precautions, limit sedating medications, consider PT/OT as patient is able to cooperate

## 2017-08-19 NOTE — PLAN OF CARE
Problem: Patient Care Overview  Goal: Interdisciplinary Rounds/Family Conf  No acute events noted throughout shift. Patient able to state name and follow some commands. EEG and MRI performed. No signs of distress. Insulin gtt @ 1.5 units/hr. Family at bedside. Plan of care discussed with patient and family. VSS. Will continue to monitor.

## 2017-08-19 NOTE — SUBJECTIVE & OBJECTIVE
Past Medical History:   Diagnosis Date    Anticoagulant long-term use     Arthritis     Blood transfusion     CHF (congestive heart failure)     Coronary artery disease     Diabetes mellitus     Hypertension     Renal disorder        Past Surgical History:   Procedure Laterality Date    CARDIAC SURGERY      COLONOSCOPY N/A 10/16/2016    Procedure: COLONOSCOPY;  Surgeon: Jamar White MD;  Location[facility];  Service: Endoscopy;  Laterality: N/A;    CORONARY ARTERY BYPASS GRAFT  2000    LEG AMPUTATION THROUGH LOWER TIBIA AND FIBULA      right leg - prosthesis in place    stents  left leg       Review of patient's allergies indicates:   Allergen Reactions    Budesonide Rash    [place] Rash       Family History     Problem Relation (Age of Onset)    Asthma Mother    COPD Mother    Diabetes Sister, Brother    Heart disease Sister    Hypertension Father, Sister    Kidney disease Brother    No Known Problems Brother, Brother, Sister    Stroke Father        Social History Main Topics    Smoking status: Heavy Tobacco Smoker     Packs/day: 1.00     Years: 46.00     Types: Cigarettes     Last attempt to quit: 11/1/2012    Smokeless tobacco: Not on file    Alcohol use Yes      Comment: 1 drink per week    Drug use: No    Sexual activity: Not on file      Review of Systems   Unable to obtain given patient's factors.    Objective:     Vital Signs (Most Recent):    Vital Signs (24h Range):      Weight: 62.8 kg (138 lb 7.2 oz)  Body mass index is 21.68 kg/m².    No intake or output data in the 24 hours ending 08/19/17 0022    Physical Exam  General: alert, non-verbal and does not follow commands, appears to be in NAD  Eyes: conjunctivae/corneas clear. PERRL. EOMI.  Neck: supple, symmetrical, trachea midline, no JVD  Cardiovascular: Heart: regular rate and rhythm, S1, S2 normal, no murmur, click, rub or gallop.  Lungs: clear to auscultation bilaterally and normal respiratory effort  Chest Wall: no  tenderness, median sternotomy appreciated  Abdomen/Rectal: Abdomen: Non distended. + BS. No masses. No TTP. No rebound or guarding. Rectal: not examined  Extremities: right AKA, scars of harvested left great saphenous vein, there is no edema, no redness or tenderness in the calves or thighs. Pulses: 2+ and symmetric  Skin: Skin color, texture, turgor normal. No rashes or lesions  Musculoskeletal: full range of motion of joints  Lymph Nodes: No cervical or supraclavicular adenopathy  Psych/Behavioral: alert, patient is non-communicative, GCS 13 with Eye: 4, verbal 4, motor: 5    Vents:     Lines/Drains/Airways          No matching active lines, drains, or airways        Significant Labs:    CBC/Anemia Profile:    Recent Labs  Lab 08/18/17  2305   WBC 14.04*   HGB 11.7*   HCT 35.0*      MCV 86   RDW 14.5        Chemistries:  No results for input(s): NA, K, CL, CO2, BUN, CREATININE, CALCIUM, ALBUMIN, PROT, BILITOT, ALKPHOS, ALT, AST, GLUCOSE, MG, PHOS in the last 48 hours.

## 2017-08-19 NOTE — ASSESSMENT & PLAN NOTE
-EF per report 45% with diastolic dysfunction  -On lipitor  -On beta blocker  -Not on ACE/ARB, possibly 2/2 EILEEN vs CKD

## 2017-08-19 NOTE — SUBJECTIVE & OBJECTIVE
ROS:  Unable to obtain due to: altered mental status  Review of Systems    Current Medications and/or Treatments Impacting Glycemic Control  Immunotherapy:    Immunosuppressants     None        Steroids:   Hormones     None        Pressors:    Autonomic Drugs     None        Hyperglycemia/Diabetes Medications:   Antihyperglycemics     Start     Stop Route Frequency Ordered    08/19/17 0230  insulin regular (Humulin R) 100 Units in sodium chloride 0.9% 100 mL infusion      -- IV Continuous 08/19/17 0125    08/19/17 0132  insulin aspart pen 0-5 Units      -- SubQ Every 6 hours PRN 08/19/17 0032           PHYSICAL EXAMINATION:Vitals:    08/19/17 0800   BP: (!) 175/76   Pulse: 83   Resp: (!) 26   Temp:      Body mass index is 21.68 kg/m².    Physical Exam   Constitutional: He is easily aroused. No distress.   Eyes: EOM are normal. Pupils are equal, round, and reactive to light.   Neck: Normal range of motion. Neck supple.   Cardiovascular: Normal rate and regular rhythm.    No murmur heard.  Pulmonary/Chest: Effort normal and breath sounds normal. No respiratory distress. He has no wheezes. He has no rales.   Abdominal: Soft. He exhibits no distension. There is no tenderness. There is no guarding.   Musculoskeletal: He exhibits no tenderness.   S/p R AKA   Neurological: He is alert and easily aroused. No cranial nerve deficit.   Oriented to name only   Skin: Skin is warm and dry.   Psychiatric: He is not agitated. He is noncommunicative.

## 2017-08-19 NOTE — ASSESSMENT & PLAN NOTE
Likely due to diabetic and hypertensive nephropathy  Unknown Cr baseline  Renally dose medications  Check CrCl  Heparin for DVT ppx

## 2017-08-19 NOTE — HPI
"66 yo M with PMHx of CAD s/p CABG, PVD with right BKA, CKD III, HTN, poorly controlled DM type II, and alcohol abuse transferred 08/18/17 from [facility] ([facility]) after presenting to their ED on 8/8/2017 after wife noticed acute personality changes and confusion.  She denied a strong EtOH use history and patient is reported to have only 14 beers/wk per chart but he also received chlordiazepoxide at OSH.  When in OSH ED, he was found to have DKA which was difficult to treat. He had a CT head there which showed stable changes of L temporal hemorrhage previously identified 05/12/17--"linear hyperdensity in the medial left temporal lobe and ex vacuo dilatation of the temporal horn of the left lateral ventricle were unchanged."  He also had an EEG 08/14/17 showing generalized slowing consistent with a toxic-metabolic encephalopathy rather than seizure or NCSE.  Thiamine was not checked at OSH per records, though patient was receiving thiamine 100 mg qd at OSH and here.  Patient began refusing meals at OSH and had PEG tube placement 08/17/17.  He remained confused with labile glucose and was transferred to [facility] 08/18/17.  Neurology is consulted today for continued confusion.  Currently patient has a mild leukocytosis to 13.90 k/uL with CXR noting bilateral pleural effusions and abnormal opacification in L middle/lower lobes.  UA largely unremarkable for infection with 1 WBC, rare bacteria, and negative nitrite/leukocyte esterase.  Blood cxs, urine cx pending.  VSS with Tmax 99.6 F.  This am, patient is somnolent but able to follow commands and responds to some simple questions.  He demonstrates no signs of nuchal rigidity and family recalls no recent head trauma apart from 05/2017 with the corresponding temporal hemorrhage on CT head.  Remainder OSH records below per 'labs/imaging' section.  "

## 2017-08-19 NOTE — SUBJECTIVE & OBJECTIVE
Past Medical History:   Diagnosis Date    Anticoagulant long-term use     Arthritis     Blood transfusion     CHF (congestive heart failure)     Coronary artery disease     Diabetes mellitus     Hypertension     Renal disorder      Past Surgical History:   Procedure Laterality Date    CARDIAC SURGERY      COLONOSCOPY N/A 10/16/2016    Procedure: COLONOSCOPY;  Surgeon: Jamar White MD;  Location: [facility];  Service: Endoscopy;  Laterality: N/A;    CORONARY ARTERY BYPASS GRAFT  2000    LEG AMPUTATION THROUGH LOWER TIBIA AND FIBULA      right leg - prosthesis in place    stents  left leg     Review of patient's allergies indicates:   Allergen Reactions    Budesonide Rash    Gary Rash     Current Neurological Medications:   Amitriptyline 25 mg po qHS  Clopidogrel 75 mg po qd  Gabapentin 600 mg po tid  Simvastatin 20 mg po qHS    No current facility-administered medications on file prior to encounter.      Current Outpatient Prescriptions on File Prior to Encounter   Medication Sig    amitriptyline (ELAVIL) 25 MG tablet Take 25 mg by mouth nightly as needed.      amlodipine (NORVASC) 10 MG tablet Take 15 mg by mouth once daily.      aspirin 325 MG tablet Take 325 mg by mouth once daily.      clonidine (CATAPRES) 0.1 MG tablet Take 0.1 mg by mouth 2 (two) times daily.      clopidogrel (PLAVIX) 75 mg tablet Take 75 mg by mouth once daily.      CONTOUR NEXT STRIPS Strp test SIX TIMES DAILY    furosemide (LASIX) 40 MG tablet once daily.     gabapentin (NEURONTIN) 600 MG tablet Take 600 mg by mouth 3 (three) times daily.      hydrochlorothiazide (HYDRODIURIL) 25 MG tablet Take 25 mg by mouth once daily.      isosorbide mononitrate (IMDUR) 30 MG 24 hr tablet Take 30 mg by mouth once daily.      lansoprazole (PREVACID) 30 MG capsule Take 30 mg by mouth once daily.      lisinopril (PRINIVIL,ZESTRIL) 20 MG tablet Take 1 tablet (20 mg total) by mouth once daily.    metoprolol succinate  (TOPROL-XL) 100 MG 24 hr tablet Take 100 mg by mouth 2 (two) times daily.      metoprolol tartrate (LOPRESSOR) 100 MG tablet Take 100 mg by mouth 2 (two) times daily.    NOVOLOG 100 unit/mL injection     omeprazole (PRILOSEC) 20 MG capsule     pantoprazole (PROTONIX) 40 MG tablet Take 40 mg by mouth once daily.    pyridoxine, vitamin B6, (VITAMIN B-6) 100 MG Tab Take 50 mg by mouth once daily.    ranolazine (RANEXA) 500 MG Tb12 Take 500 mg by mouth 2 (two) times daily.      simvastatin (ZOCOR) 20 MG tablet Take 20 mg by mouth every evening.      sucralfate (CARAFATE) 1 gram tablet TAKE 1 TABLET BY MOUTH FOUR TIMES DAILY BEFORE MEALS and AT BEDTIME    vitamin D 1000 units Tab Take 185 mg by mouth once daily.     Family History     Problem Relation (Age of Onset)    Asthma Mother    COPD Mother    Diabetes Sister, Brother    Heart disease Sister    Hypertension Father, Sister    Kidney disease Brother    No Known Problems Brother, Brother, Sister    Stroke Father        Social History Main Topics    Smoking status: Heavy Tobacco Smoker     Packs/day: 1.00     Years: 46.00     Types: Cigarettes     Last attempt to quit: 11/1/2012    Smokeless tobacco: Not on file    Alcohol use Yes      Comment: 1 drink per week    Drug use: No    Sexual activity: Not on file     Review of Systems   Unable to perform ROS: Mental status change     Objective:     Vital Signs (Most Recent):  Temp: 98.4 °F (36.9 °C) (08/19/17 1100)  Pulse: 74 (08/19/17 1200)  Resp: 19 (08/19/17 1200)  BP: (!) 177/77 (08/19/17 1200)  SpO2: 95 % (08/19/17 1200) Vital Signs (24h Range):  Temp:  [98 °F (36.7 °C)-99.6 °F (37.6 °C)] 98.4 °F (36.9 °C)  Pulse:  [] 74  Resp:  [18-28] 19  SpO2:  [94 %-100 %] 95 %  BP: (154-199)/(70-92) 177/77     Weight: 62.8 kg (138 lb 7.2 oz)  Body mass index is 21.68 kg/m².    Physical Exam  General:  Well-developed, well-nourished, nad--somnolent with eyes opening only to voice/tactile stimuli  HEENT:  NCAT,  PERRL, EOMI.  Oropharyngeal membranes non-erythematous/without exudate  Neck:  Appears supple without obvious rigidity.  No palpable lad.  Resp:  Symmetric expansion, no increased wob  CVS:  RRR, no m/r/g.  No LE edema.  Signs of venous stasis on LLE.  GI:  Abd soft, non-distended, non-tender, +BS  Neurologic:  Mental Status:  Awake but somnolent with eye opening only to voice/tactile stimuli.  Knows his name, able to answer a few other simple Y/N questions.  Follows simple commands, but inconsistently (requires repeated prompting).  Cranial Nerves:  Blinks to threat bilaterally.  PERRL, EOMI.  Facial movements appear to be intact, unable to reliably test sensation but patient able to clench jaw with palpable masseter.  Tongue protrudes midline, unable to evaluate palate raise.  SCM and trapezius movements visualized but patient not following commands to resist.  Motor:  Mild increase in tone in BUE and LLE noted.  Bulk wnl.  Not following complex commands, but moving all extremities.  Has 5/5  strength.  Sensory:  Unable to test reliably but withdraws to pain and grimaces with noxious stimuli at all extremities.  Reflexes:  Biceps, brachioradialis, pectoralis, and patellar reflexes brisk and 2+.  Triple flexion reflex on Babinski to LLE.  2-3 beats of ankle clonus at LLE.  Coordination:  No resting tremor or apparent myoclonus.  Unable to evaluate further 2/2 mental status.  Gait:  Deferred 2/2 mental status     Significant Labs:   Recent Labs  Lab 08/19/17  0437   WBC 13.90*   RBC 4.29*   HGB 12.5*   HCT 37.2*      MCV 87   MCH 29.1   MCHC 33.6     Recent Labs  Lab 08/19/17  1130   CALCIUM 9.3  9.3   PROT 6.2     143   K 4.1  4.1   CO2 17*  17*   *  115*   BUN 19  19   CREATININE 1.8*  1.8*   ALKPHOS 112   ALT 27   AST 23   BILITOT 0.3     Recent Labs  Lab 08/19/17  0440   PH 7.394   PCO2 24.3*   PO2 42   HCO3 14.8*   POCSATURATED 79*   BE -10     Recent Labs  Lab 08/19/17  0795  "  TROPONINI 0.635*     Recent Labs  Lab 08/18/17  2329   COLORU Yellow   SPECGRAV 1.015   PHUR 5.0   PROTEINUA 2+*   BACTERIA Rare     Significant Imaging:   ([facility] Records):  08/15/17 CXR:  There is cephalization of blood flow and increased interstitial markings highly suspicious for pulmonary edema. There are increased markings in the lung bases left greater than right cannot rule out a small   left-sided effusion and bilateral atelectasis.    08/14/17 EEG:  The EEG recording showed moderate-to-marked nonspecific abnormalities of a generalized nature which may be seen with diffuse CNS disturbances including toxic-metabolic disorders and degenerative CNS disease but which could also be due in part to drowsiness.  There were no definite lateralizing features.  No definite epileptiform abnormalities were noted either.  No "PLEDs" (periodic lateralized epileptiform discharges) were noted in the tracing, which would tend to make focal encephalitis (such as herpes encephalitis) less likely.  Further clinical-radiologic correlation such as with CT or MRI scan of brain if feasible may be of additional value if underlying structural CNS lesions are clinically suspected.  Absence of epileptiform abnormalities in the EEG tracing, however, does not completely exclude the possibility of a seizure disorder, although the present tracing did not show evidence of any subclinical "nonconvulsive seizures" throughout the recording.  If symptoms should progress, followup EEG studies at a future date to detect any interval changes may be of additional value.  Clinical correlation is recommended.    08/14/17 CT head:  Findings:  This study was degraded by motion artifact.  The linear hyperdensity in the medial left temporal lobe   (sequence 2, image 15) is unchanged. Ex vacuo dilatation of the temporal horn of the left lateral   ventricle is unchanged. There is no acute brain parenchymal abnormality. There is mild volume " loss.   Ventricles and sulci are otherwise unremarkable. Periventricular and supraventricular white matter low   densities are most consistent with chronic microvascular ischemia. These could obscure a focus of   superimposed acute ischemia. There is intracranial calcific atherosclerosis. There is no new hemorrhage.   Visualized paranasal sinuses and mastoid air cells are clear.    08/14/17 US Carotid: Impressions                                                                                                                * Right internal carotid has <50% stenosis                              * Left internal carotid has <50% stenosis                               * Vertebral arteries are antegrade bilaterally      08/14/17 Echo  * Study quality: Technically difficult                                  * The estimated left ventricle ejection fraction is 45-50% (abnormal)   * Diastolic function is abnormal (Grade 2)                              * There is moderate aortic regurgitation

## 2017-08-19 NOTE — ASSESSMENT & PLAN NOTE
Poorly controlled due to patient previously refusing po meds  Start toprolXL and resume lisinopril in absence of EILEEN on pending labs  Prn IV labetalol

## 2017-08-19 NOTE — ASSESSMENT & PLAN NOTE
Likely multifactorial with acute glycemic changes and recent alcohol use being a possibility  CT head x2 at OSH were unrevealing  Attempt achieving glycemic range of 140-180mg/dL  Obtain EEG    Consult neuro and consider ordering MRI brain and LP  Delirium precautions and neurochecks [id number]  Use chemo restraints for agitation

## 2017-08-19 NOTE — ASSESSMENT & PLAN NOTE
EF 45-50% with diastolic dysfunction  Continue toprolXL 100mg daily   resuming lisinopril 20mg daily pending cmp

## 2017-08-19 NOTE — ASSESSMENT & PLAN NOTE
"-Workup per primary team  -Encephalopathy seems far out of proportion from DKA alone particularly if reported as "mild" on initial OSH presentation; however, do not have labwork to confirm  -TSH in process  -Added on cortisol  "

## 2017-08-19 NOTE — CONSULTS
"[facility]  Endocrinology  Diabetes Consult Note    Consult Requested by: Linda Mercado MD   Reason for admit: Acute metabolic encephalopathy    HISTORY OF PRESENT ILLNESS:  Mr. Cousin is a 66 y/o PMH DM (unclear if Type I vs II), CHF (unk. EF), HTN, EtOH abuse, EILEEN vs CKD who presents as a transfer from [facility] after presenting with chief complaint of AMS. Currently encephalopathic and no family available for collateral history. Per transfer report presented 08/08/2017 with acute personality changes and confusion noticed by wife. Per their report was in "mild" ketoacidosis. Apparently blood glucose values had been extremely labile with patient fluctuating between hypoglycemia and hyperglycemia with minimal insulin so transferred for additional endocrinology evaluation. Unknown length of duration of DM diagnosis or home medications.     Per report mental status has been disorented x3, with agitation and combativeness requiring restraints. CT head at OSH notable for chronic ischemic changes but no acute abnormality. Evaluated by Neurology there who suspected multifactorial metabolic encephalopathy. Also treated for possible EtOH withdrawal.     Reason for Consult: Management of T2DM, Hyperglycemia     Surgical Procedure and Date: unknown    Diabetes diagnosis year: unknown    Home Diabetes Medications:  unknown    How often checking glucose at home? Unknown   BG readings on regimen: Unknown  Hypoglycemia on the regimen?  unknown  Missed doses on regimen? unknown    Diabetes Complications include:     Diabetic chronic kidney disease      and Amputation status    Complicating diabetes co morbidities:   CHF and CKD          ROS:  Unable to obtain due to: altered mental status  Review of Systems    Current Medications and/or Treatments Impacting Glycemic Control  Immunotherapy:    Immunosuppressants     None        Steroids:   Hormones     None        Pressors:    Autonomic Drugs  "    None        Hyperglycemia/Diabetes Medications:   Antihyperglycemics     Start     Stop Route Frequency Ordered    08/19/17 0230  insulin regular (Humulin R) 100 Units in sodium chloride 0.9% 100 mL infusion      -- IV Continuous 08/19/17 0125    08/19/17 0132  insulin aspart pen 0-5 Units      -- SubQ Every 6 hours PRN 08/19/17 0032           PHYSICAL EXAMINATION:Vitals:    08/19/17 0800   BP: (!) 175/76   Pulse: 83   Resp: (!) 26   Temp:      Body mass index is 21.68 kg/m².    Physical Exam   Constitutional: He is easily aroused. No distress.   Eyes: EOM are normal. Pupils are equal, round, and reactive to light.   Neck: Normal range of motion. Neck supple.   Cardiovascular: Normal rate and regular rhythm.    No murmur heard.  Pulmonary/Chest: Effort normal and breath sounds normal. No respiratory distress. He has no wheezes. He has no rales.   Abdominal: Soft. He exhibits no distension. There is no tenderness. There is no guarding.   Musculoskeletal: He exhibits no tenderness.   S/p R AKA   Neurological: He is alert and easily aroused. No cranial nerve deficit.   Oriented to name only   Skin: Skin is warm and dry.   Psychiatric: He is not agitated. He is noncommunicative.         Labs Reviewed and Include     Recent Labs  Lab 08/19/17  0437 08/19/17  0730   *  363* 308*   CALCIUM 9.6  9.6 9.5   ALBUMIN 2.0*  --    PROT 6.8  --      144 144   K 4.9  4.9 4.4   CO2 11*  11* 15*   *  116* 117*   BUN 20  20 19   CREATININE 2.0*  2.0* 1.9*   ALKPHOS 133  --    ALT 30  --    AST 28  --    BILITOT 0.3  --      Lab Results   Component Value Date    WBC 13.90 (H) 08/19/2017    HGB 12.5 (L) 08/19/2017    HCT 37.2 (L) 08/19/2017    MCV 87 08/19/2017     08/19/2017     No results for input(s): TSH, FREET4 in the last 168 hours.  Lab Results   Component Value Date    HGBA1C 10.0 (H) 08/18/2017       Nutritional status:   Body mass index is 21.68 kg/m².  Lab Results   Component Value Date     "ALBUMIN 2.0 (L) 08/19/2017    ALBUMIN 2.0 (L) 08/18/2017    ALBUMIN 2.7 (L) 10/15/2016     No results found for: PREALBUMIN    Estimated Creatinine Clearance: 33.5 mL/min (based on Cr of 1.9).    Accu-Checks  Recent Labs      08/18/17   2318  08/19/17   0018  08/19/17   0120  08/19/17   0216  08/19/17   0315  08/19/17   0422  08/19/17   0508  08/19/17   0612   POCTGLUCOSE  318*  343*  411*  432*  382*  407*  378*  331*        ASSESSMENT and PLAN    Uncontrolled type 2 diabetes mellitus with ketoacidosis without coma, with long-term current use of insulin    -Per report Type II, seen in clinic in 2016 with dx type 2; however, unable to obtain collateral and possibly Type I  -Would suspect chronic diagnosis given AKA, CAD, possible CKD  -C-peptide undetectable with BS in 400s; however, may be due to stunning and needs repeat after DKA resolved  -Agree with sending KATELIN Ab  -Anion gap improving, corrected anion gap borderline at 17 this AM but remains acidotic, would consider additional concurrent non-gap acidosis (EILEEN vs RTA4)  -Will continue to try to get collateral from family regarding history  -Recommend continuing intensive insulin gtt at this time  -Recommend repeat beta hydroxybutyrate         * Acute metabolic encephalopathy    -Workup per primary team  -Encephalopathy seems far out of proportion from DKA alone particularly if reported as "mild" on initial OSH presentation; however, do not have labwork to confirm  -TSH in process  -Added on cortisol        Chronic combined systolic and diastolic heart failure    -EF per report 45% with diastolic dysfunction  -On lipitor  -On beta blocker  -Not on ACE/ARB, possibly 2/2 EILEEN vs CKD    avoid hypoglycemia       CAD (coronary artery disease)    -Per primary  -On lipitor  Avoid hypoglycemia           Essential hypertension    -Managed per primary  -Long term BP goal at least <140/<80            Plan discussed with primary team    Ron Cabral IV, " MD  Endocrinology  [facility], Yanci Hernandez MD,  have personally taken the history and examined the patient and agree with the resident's note as stated above.    On insulin  pump ? Cant find rates with chart review      Suspect insulinopenia based on labile glucose levels - treat as T1DM  Needs higher dose insulin to close  Gap with intensive monitoring   Would start nutrition once glucose better to prevent concomitant starvation ketosis (this will also allow us to increase insulin rate)  Check zackery       Lab Results   Component Value Date    TSH 2.131 08/19/2017

## 2017-08-19 NOTE — HPI
"Mr. Cousin is a 66 y/o PMH DM (unclear if Type I vs II), CHF (unk. EF), HTN, EtOH abuse, EILEEN vs CKD who presents as a transfer from [facility] after presenting with chief complaint of AMS. Currently encephalopathic and no family available for collateral history. Per transfer report presented 08/08/2017 with acute personality changes and confusion noticed by wife. Per their report was in "mild" ketoacidosis. Apparently blood glucose values had been extremely labile with patient fluctuating between hypoglycemia and hyperglycemia with minimal insulin so transferred for additional endocrinology evaluation. Unknown length of duration of DM diagnosis or home medications.     Per report mental status has been disorented x3, with agitation and combativeness requiring restraints. CT head at OSH notable for chronic ischemic changes but no acute abnormality. Evaluated by Neurology there who suspected multifactorial metabolic encephalopathy. Also treated for possible EtOH withdrawal.     Reason for Consult: Management of T2DM, Hyperglycemia     Surgical Procedure and Date: unknown    Diabetes diagnosis year: unknown    Home Diabetes Medications:  unknown    How often checking glucose at home? Unknown   BG readings on regimen: Unknown  Hypoglycemia on the regimen?  unknown  Missed doses on regimen? unknown    Diabetes Complications include:     Diabetic chronic kidney disease      and Amputation status    Complicating diabetes co morbidities:   CHF and CKD      "

## 2017-08-19 NOTE — HPI
"This is a 67 year old male with pmhx significant for CAD s/p CABG, PVD with right BKA, CKD stage III, and HTN, DM type II with labile glycemic control, and alcohol abuse who is transferred from [facility] after presenting to their ED on 8/8/2017 after wife noticed acute personality changes and confusion. In the ED, the patient was noted to be in "mild" DKA that responded drastically to subcutaneous insulin prior to initiating insulin infusion. The patient was admitted to the ICU for closer monitoring. The patient's confusion was progressive as patient manifested combativeness and abrupt agitation. With this, the patient became non-verbal. The medical team found great difficulty in controlling the predominantly hyperglycemic state as the patient would become severely hypoglycemic to the 30's with small amount of subcutaneous insulin. The patient manifested hyperglycemia predominantly despite refusing PO intake for more than 4 consecutive days. Eventually, a PEG tube was placed on 8/17/2017 to force caloric intake. CT of the head was non revealing other than for chronic ischemic changes and signs of previous stable intracranial bleeding. MRI brain and lumbar puncture were not done due to patient being on restraints and lack of signs of infection, respectively. Neurology was consulted at the OSH who suspected multifactorial metabolic encephalopathy given patient's recent alcohol abuse that was initially denied by the patient's wife on admission. The patient transiently received Librium and prn Ativan for suspected alcohol withdrawal with no notable improvement. The patient was transferred to [facility] for endocrine evaluation and further neurological workup.  This HPI was obtained through chart review and sign out from transferring house staff given patient's factors and absence of collateral history.     "

## 2017-08-19 NOTE — H&P
"[facility]  Critical Care Medicine  History & Physical    Patient Name: Brandon Simmons  MRN: [id number]  Admission Date: 8/18/2017  Hospital Length of Stay: 1 days  Code Status: Full Code  Attending Physician: Didier Velasquez*   Primary Care Provider: Neville Willis MD   Principal Problem: Acute metabolic encephalopathy    Subjective:     HPI:  This is a 67 year old male with pmhx significant for CAD s/p CABG, PVD with right BKA, CKD stage III, and HTN, DM type II with labile glycemic control, and alcohol abuse who is transferred from [facility] after presenting to their ED on 8/8/2017 after wife noticed acute personality changes and confusion. In the ED, the patient was noted to be in "mild" DKA that responded drastically to subcutaneous insulin prior to initiating insulin infusion. The patient was admitted to the ICU for closer monitoring. The patient's confusion was progressive as patient manifested combativeness and abrupt agitation. With this, the patient became non-verbal. The medical team found great difficulty in controlling the predominantly hyperglycemic state as the patient would become severely hypoglycemic to the 30's with small amount of subcutaneous insulin. The patient manifested hyperglycemia predominantly despite refusing PO intake for more than 4 consecutive days. Eventually, a PEG tube was placed on 8/17/2017 to force caloric intake. CT of the head was non revealing other than for chronic ischemic changes and signs of previous stable intracranial bleeding. MRI brain and lumbar puncture were not done due to patient being on restraints and lack of signs of infection, respectively. Neurology was consulted at the OSH who suspected multifactorial metabolic encephalopathy given patient's recent alcohol abuse that was initially denied by the patient's wife on admission. The patient transiently received Librium and prn Ativan for suspected alcohol " withdrawal with no notable improvement. The patient was transferred to [facility] for endocrine evaluation and further neurological workup.  This HPI was obtained through chart review and sign out from transferring house staff given patient's factors and absence of collateral history.       Hospital/ICU Course:  No notes on file     Past Medical History:   Diagnosis Date    Anticoagulant long-term use     Arthritis     Blood transfusion     CHF (congestive heart failure)     Coronary artery disease     Diabetes mellitus     Hypertension     Renal disorder        Past Surgical History:   Procedure Laterality Date    CARDIAC SURGERY      COLONOSCOPY N/A 10/16/2016    Procedure: COLONOSCOPY;  Surgeon: Jamar White MD;  Location: [facility];  Service: Endoscopy;  Laterality: N/A;    CORONARY ARTERY BYPASS GRAFT  2000    LEG AMPUTATION THROUGH LOWER TIBIA AND FIBULA      right leg - prosthesis in place    stents  left leg       Review of patient's allergies indicates:   Allergen Reactions    Budesonide Rash    [place] Rash       Family History     Problem Relation (Age of Onset)    Asthma Mother    COPD Mother    Diabetes Sister, Brother    Heart disease Sister    Hypertension Father, Sister    Kidney disease Brother    No Known Problems Brother, Brother, Sister    Stroke Father        Social History Main Topics    Smoking status: Heavy Tobacco Smoker     Packs/day: 1.00     Years: 46.00     Types: Cigarettes     Last attempt to quit: 11/1/2012    Smokeless tobacco: Not on file    Alcohol use Yes      Comment: 1 drink per week    Drug use: No    Sexual activity: Not on file      Review of Systems   Unable to obtain given patient's factors.    Objective:     Vital Signs (Most Recent):    Vital Signs (24h Range):      Weight: 62.8 kg (138 lb 7.2 oz)  Body mass index is 21.68 kg/m².    No intake or output data in the 24 hours ending 08/19/17 0022    Physical Exam  General: alert, non-verbal and does not  follow commands, appears to be in NAD  Eyes: conjunctivae/corneas clear. PERRL. EOMI.  Neck: supple, symmetrical, trachea midline, no JVD  Cardiovascular: Heart: regular rate and rhythm, S1, S2 normal, no murmur, click, rub or gallop.  Lungs: clear to auscultation bilaterally and normal respiratory effort  Chest Wall: no tenderness, median sternotomy appreciated  Abdomen/Rectal: Abdomen: Non distended. + BS. No masses. No TTP. No rebound or guarding. Rectal: not examined  Extremities: right AKA, scars of harvested left great saphenous vein, there is no edema, no redness or tenderness in the calves or thighs. Pulses: 2+ and symmetric  Skin: Skin color, texture, turgor normal. No rashes or lesions  Musculoskeletal: full range of motion of joints  Lymph Nodes: No cervical or supraclavicular adenopathy  Psych/Behavioral: alert, patient is non-communicative, GCS 13 with Eye: 4, verbal 4, motor: 5    Vents:     Lines/Drains/Airways          No matching active lines, drains, or airways        Significant Labs:    CBC/Anemia Profile:    Recent Labs  Lab 08/18/17  2305   WBC 14.04*   HGB 11.7*   HCT 35.0*      MCV 86   RDW 14.5        Chemistries:  No results for input(s): NA, K, CL, CO2, BUN, CREATININE, CALCIUM, ALBUMIN, PROT, BILITOT, ALKPHOS, ALT, AST, GLUCOSE, MG, PHOS in the last 48 hours.        Assessment/Plan:     Neuro   * Acute metabolic encephalopathy    Likely multifactorial with acute glycemic changes and recent alcohol use being a possibility  CT head x2 at OSH were unrevealing  Attempt achieving glycemic range of 140-180mg/dL  Obtain EEG    Consult neuro and consider ordering MRI brain and LP  Delirium precautions and neurochecks [id number]  Use chemo restraints for agitation              Cardiac/Vascular   Chronic systolic heart failure    EF 45-50% with diastolic dysfunction  Continue toprolXL 100mg daily   resuming lisinopril 20mg daily pending cmp         Essential hypertension    Poorly controlled  due to patient previously refusing po meds  Start toprolXL and resume lisinopril in absence of EILEEN on pending labs  Prn IV labetalol        CAD (coronary artery disease)    Hx of CABG  2D echo with EF 45-50% with diastolic dysfunction  Continue plavix and ToprolXL   Continue         Renal/   CKD (chronic kidney disease) stage 3, GFR 30-59 ml/min    Likely due to diabetic and hypertensive nephropathy  Unknown Cr baseline  Renally dose medications  Check CrCl  Heparin for DVT ppx           Endocrine   Diabetic ketoacidosis    Reported brittle DM type ii and labile glycemic control  Order DKA and pancreatic endocrine labs  LDSSI pending labs  Endocrine consult in the am, appreciate their assistance            Critical Care Daily Checklist:    A: Awake: RASS Goal/Actual Goal:   0  Actual:   1   B: Spontaneous Breathing Trial Performed?  n/a   C: SAT & SBT Coordinated?  n/a                     D: Delirium: CAM-ICU  no   E: Early Mobility Performed?    F: Feeding Goal:    Status:     Current Diet Order   Procedures    Diet NPO Except for: Sips with Medication     Order Specific Question:   Except for     Answer:   Sips with Medication      AS: Analgesia/Sedation Prn tylenol, avoid opioids and neurosedatives    T: Thromboembolic Prophylaxis subq Heparin    H: HOB > 300 yes   U: Stress Ulcer Prophylaxis (if needed) Not needed   G: Glucose Control LDSSI pending further workup   B: Bowel Function     I: Indwelling Catheter (Lines & Orellana) Necessity Yes, insert new orellana cath   D: De-escalation of Antimicrobials/Pharmacotherapies n/a    Plan for the day/ETD dka labs and sugar control and explore stability for step down    Code Status:  Family/Goals of Care: Full Code            VANESSA Gutierrez  Critical Care Medicine  [facility]

## 2017-08-20 PROBLEM — E11.10 UNCONTROLLED TYPE 2 DIABETES MELLITUS WITH KETOACIDOSIS WITHOUT COMA, WITH LONG-TERM CURRENT USE OF INSULIN: Status: RESOLVED | Noted: 2017-08-19 | Resolved: 2017-08-20

## 2017-08-20 PROBLEM — Z79.4 UNCONTROLLED TYPE 2 DIABETES MELLITUS WITH KETOACIDOSIS WITHOUT COMA, WITH LONG-TERM CURRENT USE OF INSULIN: Status: RESOLVED | Noted: 2017-08-19 | Resolved: 2017-08-20

## 2017-08-20 LAB
ANION GAP SERPL CALC-SCNC: 10 MMOL/L
ANION GAP SERPL CALC-SCNC: 5 MMOL/L
ANION GAP SERPL CALC-SCNC: 7 MMOL/L
ANION GAP SERPL CALC-SCNC: 7 MMOL/L
ANION GAP SERPL CALC-SCNC: 8 MMOL/L
B-OH-BUTYR BLD STRIP-SCNC: 0.3 MMOL/L
BACTERIA UR CULT: NO GROWTH
BASOPHILS # BLD AUTO: 0.03 K/UL
BASOPHILS NFR BLD: 0.3 %
BUN SERPL-MCNC: 13 MG/DL
BUN SERPL-MCNC: 14 MG/DL
BUN SERPL-MCNC: 15 MG/DL
BUN SERPL-MCNC: 16 MG/DL
BUN SERPL-MCNC: 16 MG/DL
CALCIUM SERPL-MCNC: 8.8 MG/DL
CALCIUM SERPL-MCNC: 8.8 MG/DL
CALCIUM SERPL-MCNC: 8.9 MG/DL
CALCIUM SERPL-MCNC: 9 MG/DL
CALCIUM SERPL-MCNC: 9.3 MG/DL
CHLORIDE SERPL-SCNC: 111 MMOL/L
CHLORIDE SERPL-SCNC: 114 MMOL/L
CHLORIDE SERPL-SCNC: 114 MMOL/L
CHLORIDE SERPL-SCNC: 115 MMOL/L
CHLORIDE SERPL-SCNC: 115 MMOL/L
CO2 SERPL-SCNC: 18 MMOL/L
CO2 SERPL-SCNC: 19 MMOL/L
CO2 SERPL-SCNC: 19 MMOL/L
CO2 SERPL-SCNC: 20 MMOL/L
CO2 SERPL-SCNC: 22 MMOL/L
CREAT SERPL-MCNC: 1.5 MG/DL
CREAT SERPL-MCNC: 1.6 MG/DL
CREAT SERPL-MCNC: 1.8 MG/DL
DIFFERENTIAL METHOD: ABNORMAL
EOSINOPHIL # BLD AUTO: 0.4 K/UL
EOSINOPHIL NFR BLD: 3.2 %
ERYTHROCYTE [DISTWIDTH] IN BLOOD BY AUTOMATED COUNT: 14.3 %
EST. GFR  (AFRICAN AMERICAN): 44 ML/MIN/1.73 M^2
EST. GFR  (AFRICAN AMERICAN): 50.8 ML/MIN/1.73 M^2
EST. GFR  (AFRICAN AMERICAN): 54.9 ML/MIN/1.73 M^2
EST. GFR  (NON AFRICAN AMERICAN): 38.1 ML/MIN/1.73 M^2
EST. GFR  (NON AFRICAN AMERICAN): 43.9 ML/MIN/1.73 M^2
EST. GFR  (NON AFRICAN AMERICAN): 47.5 ML/MIN/1.73 M^2
GLUCOSE SERPL-MCNC: 174 MG/DL
GLUCOSE SERPL-MCNC: 198 MG/DL
GLUCOSE SERPL-MCNC: 262 MG/DL
GLUCOSE SERPL-MCNC: 264 MG/DL
GLUCOSE SERPL-MCNC: 303 MG/DL
HCT VFR BLD AUTO: 31.9 %
HGB BLD-MCNC: 10.8 G/DL
LYMPHOCYTES # BLD AUTO: 1.5 K/UL
LYMPHOCYTES NFR BLD: 13 %
MAGNESIUM SERPL-MCNC: 1.6 MG/DL
MCH RBC QN AUTO: 28.7 PG
MCHC RBC AUTO-ENTMCNC: 33.9 G/DL
MCV RBC AUTO: 85 FL
MONOCYTES # BLD AUTO: 0.8 K/UL
MONOCYTES NFR BLD: 6.6 %
NEUTROPHILS # BLD AUTO: 8.9 K/UL
NEUTROPHILS NFR BLD: 76.4 %
PHOSPHATE SERPL-MCNC: 2.7 MG/DL
PLATELET # BLD AUTO: 215 K/UL
PMV BLD AUTO: 10.4 FL
POCT GLUCOSE: 166 MG/DL (ref 70–110)
POCT GLUCOSE: 172 MG/DL (ref 70–110)
POCT GLUCOSE: 174 MG/DL (ref 70–110)
POCT GLUCOSE: 183 MG/DL (ref 70–110)
POCT GLUCOSE: 184 MG/DL (ref 70–110)
POCT GLUCOSE: 202 MG/DL (ref 70–110)
POCT GLUCOSE: 212 MG/DL (ref 70–110)
POCT GLUCOSE: 213 MG/DL (ref 70–110)
POCT GLUCOSE: 217 MG/DL (ref 70–110)
POCT GLUCOSE: 223 MG/DL (ref 70–110)
POCT GLUCOSE: 223 MG/DL (ref 70–110)
POCT GLUCOSE: 224 MG/DL (ref 70–110)
POCT GLUCOSE: 231 MG/DL (ref 70–110)
POCT GLUCOSE: 233 MG/DL (ref 70–110)
POCT GLUCOSE: 240 MG/DL (ref 70–110)
POCT GLUCOSE: 245 MG/DL (ref 70–110)
POCT GLUCOSE: 250 MG/DL (ref 70–110)
POCT GLUCOSE: 261 MG/DL (ref 70–110)
POCT GLUCOSE: 267 MG/DL (ref 70–110)
POCT GLUCOSE: 274 MG/DL (ref 70–110)
POCT GLUCOSE: 275 MG/DL (ref 70–110)
POCT GLUCOSE: 284 MG/DL (ref 70–110)
POCT GLUCOSE: 284 MG/DL (ref 70–110)
POCT GLUCOSE: 288 MG/DL (ref 70–110)
POCT GLUCOSE: 290 MG/DL (ref 70–110)
POCT GLUCOSE: 292 MG/DL (ref 70–110)
POCT GLUCOSE: 293 MG/DL (ref 70–110)
POCT GLUCOSE: 294 MG/DL (ref 70–110)
POCT GLUCOSE: 296 MG/DL (ref 70–110)
POCT GLUCOSE: 298 MG/DL (ref 70–110)
POCT GLUCOSE: 309 MG/DL (ref 70–110)
POCT GLUCOSE: 326 MG/DL (ref 70–110)
POCT GLUCOSE: 365 MG/DL (ref 70–110)
POCT GLUCOSE: 373 MG/DL (ref 70–110)
POTASSIUM SERPL-SCNC: 3.6 MMOL/L
POTASSIUM SERPL-SCNC: 3.8 MMOL/L
POTASSIUM SERPL-SCNC: 3.8 MMOL/L
POTASSIUM SERPL-SCNC: 3.9 MMOL/L
POTASSIUM SERPL-SCNC: 3.9 MMOL/L
RBC # BLD AUTO: 3.76 M/UL
RETIRED EF AND QEF - SEE NOTES: 30 (ref 55–65)
SODIUM SERPL-SCNC: 140 MMOL/L
SODIUM SERPL-SCNC: 141 MMOL/L
WBC # BLD AUTO: 11.58 K/UL

## 2017-08-20 PROCEDURE — 36415 COLL VENOUS BLD VENIPUNCTURE: CPT

## 2017-08-20 PROCEDURE — 63600175 PHARM REV CODE 636 W HCPCS: Performed by: INTERNAL MEDICINE

## 2017-08-20 PROCEDURE — 63600175 PHARM REV CODE 636 W HCPCS: Performed by: HOSPITALIST

## 2017-08-20 PROCEDURE — 83735 ASSAY OF MAGNESIUM: CPT

## 2017-08-20 PROCEDURE — 25000003 PHARM REV CODE 250: Performed by: STUDENT IN AN ORGANIZED HEALTH CARE EDUCATION/TRAINING PROGRAM

## 2017-08-20 PROCEDURE — 25000003 PHARM REV CODE 250: Performed by: INTERNAL MEDICINE

## 2017-08-20 PROCEDURE — 93306 TTE W/DOPPLER COMPLETE: CPT | Mod: 26,,, | Performed by: INTERNAL MEDICINE

## 2017-08-20 PROCEDURE — 25000003 PHARM REV CODE 250: Performed by: HOSPITALIST

## 2017-08-20 PROCEDURE — 20600001 HC STEP DOWN PRIVATE ROOM

## 2017-08-20 PROCEDURE — 99233 SBSQ HOSP IP/OBS HIGH 50: CPT | Mod: ,,, | Performed by: INTERNAL MEDICINE

## 2017-08-20 PROCEDURE — A4216 STERILE WATER/SALINE, 10 ML: HCPCS | Performed by: INTERNAL MEDICINE

## 2017-08-20 PROCEDURE — 80048 BASIC METABOLIC PNL TOTAL CA: CPT

## 2017-08-20 PROCEDURE — 84100 ASSAY OF PHOSPHORUS: CPT

## 2017-08-20 PROCEDURE — 93306 TTE W/DOPPLER COMPLETE: CPT

## 2017-08-20 PROCEDURE — 85025 COMPLETE CBC W/AUTO DIFF WBC: CPT

## 2017-08-20 PROCEDURE — 99232 SBSQ HOSP IP/OBS MODERATE 35: CPT | Mod: ,,, | Performed by: PSYCHIATRY & NEUROLOGY

## 2017-08-20 PROCEDURE — 80048 BASIC METABOLIC PNL TOTAL CA: CPT | Mod: 91

## 2017-08-20 PROCEDURE — 82010 KETONE BODYS QUAN: CPT

## 2017-08-20 RX ORDER — NAPROXEN SODIUM 220 MG/1
81 TABLET, FILM COATED ORAL DAILY
Status: DISCONTINUED | OUTPATIENT
Start: 2017-08-20 | End: 2017-08-25 | Stop reason: HOSPADM

## 2017-08-20 RX ORDER — IBUPROFEN 200 MG
24 TABLET ORAL
Status: DISCONTINUED | OUTPATIENT
Start: 2017-08-20 | End: 2017-08-20

## 2017-08-20 RX ORDER — AMLODIPINE BESYLATE 10 MG/1
10 TABLET ORAL DAILY
Status: DISCONTINUED | OUTPATIENT
Start: 2017-08-20 | End: 2017-08-20

## 2017-08-20 RX ORDER — INSULIN ASPART 100 [IU]/ML
0-5 INJECTION, SOLUTION INTRAVENOUS; SUBCUTANEOUS
Status: DISCONTINUED | OUTPATIENT
Start: 2017-08-20 | End: 2017-08-20

## 2017-08-20 RX ORDER — MAGNESIUM SULFATE HEPTAHYDRATE 40 MG/ML
2 INJECTION, SOLUTION INTRAVENOUS ONCE
Status: COMPLETED | OUTPATIENT
Start: 2017-08-20 | End: 2017-08-20

## 2017-08-20 RX ORDER — INSULIN ASPART 100 [IU]/ML
2-4 INJECTION, SOLUTION INTRAVENOUS; SUBCUTANEOUS EVERY 6 HOURS
Status: DISCONTINUED | OUTPATIENT
Start: 2017-08-20 | End: 2017-08-20

## 2017-08-20 RX ORDER — IBUPROFEN 200 MG
16 TABLET ORAL
Status: DISCONTINUED | OUTPATIENT
Start: 2017-08-20 | End: 2017-08-20

## 2017-08-20 RX ADMIN — DEXTROSE MONOHYDRATE, SODIUM CHLORIDE, AND POTASSIUM CHLORIDE: 50; 4.5; 1.49 INJECTION, SOLUTION INTRAVENOUS at 05:08

## 2017-08-20 RX ADMIN — ASPIRIN 81 MG CHEWABLE TABLET 81 MG: 81 TABLET CHEWABLE at 10:08

## 2017-08-20 RX ADMIN — ATORVASTATIN CALCIUM 40 MG: 10 TABLET, FILM COATED ORAL at 08:08

## 2017-08-20 RX ADMIN — AMLODIPINE BESYLATE 10 MG: 10 TABLET ORAL at 08:08

## 2017-08-20 RX ADMIN — HEPARIN SODIUM 5000 UNITS: 5000 INJECTION, SOLUTION INTRAVENOUS; SUBCUTANEOUS at 11:08

## 2017-08-20 RX ADMIN — SODIUM CHLORIDE 1.5 UNITS/HR: 9 INJECTION, SOLUTION INTRAVENOUS at 10:08

## 2017-08-20 RX ADMIN — SODIUM CHLORIDE 2.4 UNITS/HR: 9 INJECTION, SOLUTION INTRAVENOUS at 03:08

## 2017-08-20 RX ADMIN — METOPROLOL TARTRATE 50 MG: 50 TABLET, FILM COATED ORAL at 08:08

## 2017-08-20 RX ADMIN — HEPARIN SODIUM 5000 UNITS: 5000 INJECTION, SOLUTION INTRAVENOUS; SUBCUTANEOUS at 05:08

## 2017-08-20 RX ADMIN — CLOPIDOGREL 75 MG: 75 TABLET, FILM COATED ORAL at 08:08

## 2017-08-20 RX ADMIN — HEPARIN SODIUM 5000 UNITS: 5000 INJECTION, SOLUTION INTRAVENOUS; SUBCUTANEOUS at 01:08

## 2017-08-20 RX ADMIN — Medication 100 MG: at 08:08

## 2017-08-20 RX ADMIN — Medication 3 ML: at 04:08

## 2017-08-20 RX ADMIN — SODIUM CHLORIDE 0.9 UNITS/HR: 9 INJECTION, SOLUTION INTRAVENOUS at 10:08

## 2017-08-20 RX ADMIN — Medication 3 ML: at 05:08

## 2017-08-20 RX ADMIN — MAGNESIUM SULFATE IN WATER 2 G: 40 INJECTION, SOLUTION INTRAVENOUS at 05:08

## 2017-08-20 NOTE — PROGRESS NOTES
"[facility]  Endocrinology  Progress Note    Admit Date: 8/18/2017     Mr. Cousin is a 66 y/o PMH DM (unclear if Type I vs II), CHF (unk. EF), HTN, EtOH abuse, EILEEN vs CKD who presents as a transfer from [facility] after presenting with chief complaint of AMS. Currently encephalopathic and no family available for collateral history. Per transfer report presented 08/08/2017 with acute personality changes and confusion noticed by wife. Per their report was in "mild" ketoacidosis. Apparently blood glucose values had been extremely labile with patient fluctuating between hypoglycemia and hyperglycemia with minimal insulin so transferred for additional endocrinology evaluation. Unknown length of duration of DM diagnosis or home medications.     Per report mental status has been disorented x3, with agitation and combativeness requiring restraints. CT head at OSH notable for chronic ischemic changes but no acute abnormality. Evaluated by Neurology there who suspected multifactorial metabolic encephalopathy. Also treated for possible EtOH withdrawal.     Reason for Consult: Management of T2DM, Hyperglycemia     Surgical Procedure and Date: unknown    Diabetes diagnosis year: unknown    Home Diabetes Medications:  unknown    How often checking glucose at home? Unknown   BG readings on regimen: Unknown  Hypoglycemia on the regimen?  unknown  Missed doses on regimen? unknown    Diabetes Complications include:     Diabetic chronic kidney disease      and Amputation status    Complicating diabetes co morbidities:   CHF and CKD        Interval HPI:   Overnight events: none, remains on intensive gtt. Confusion slightly improved but only oriented to name. TFs planning to start today  Eating:   NPO  Nausea: No  Hypoglycemia and intervention: No  Fever: No  TPN and/or TF: No  If yes, type of TF/TPN and rate: Planning diabetasource continuous with goal rate 50    BP (!) 168/75 (BP Location: Left arm, Patient " "Position: Lying)   Pulse 78   Temp 99 °F (37.2 °C) (Axillary)   Resp (!) 26   Ht 5' 7" (1.702 m)   Wt 62.8 kg (138 lb 7.2 oz)   SpO2 95%   BMI 21.68 kg/m²       Labs Reviewed and Include      Recent Labs  Lab 08/19/17  1130  08/20/17  0900   *  243*  < > 198*   CALCIUM 9.3  9.3  < > 8.8   ALBUMIN 1.9*  --   --    PROT 6.2  --   --      143  < > 141   K 4.1  4.1  < > 3.8   CO2 17*  17*  < > 19*   *  115*  < > 115*   BUN 19  19  < > 14   CREATININE 1.8*  1.8*  < > 1.5*   ALKPHOS 112  --   --    ALT 27  --   --    AST 23  --   --    BILITOT 0.3  --   --    < > = values in this interval not displayed.  Lab Results   Component Value Date    WBC 11.58 08/20/2017    HGB 10.8 (L) 08/20/2017    HCT 31.9 (L) 08/20/2017    MCV 85 08/20/2017     08/20/2017       Recent Labs  Lab 08/19/17  0930   TSH 2.131     Lab Results   Component Value Date    HGBA1C 10.0 (H) 08/18/2017       Nutritional status:   Body mass index is 21.68 kg/m².  Lab Results   Component Value Date    ALBUMIN 1.9 (L) 08/19/2017    ALBUMIN 2.0 (L) 08/19/2017    ALBUMIN 2.0 (L) 08/18/2017     No results found for: PREALBUMIN    Estimated Creatinine Clearance: 42.4 mL/min (based on Cr of 1.5).    Accu-Checks  Recent Labs      08/19/17   2105  08/19/17   2216  08/19/17   2311  08/20/17   0006  08/20/17   0105  08/20/17   0206  08/20/17   0319  08/20/17   0408  08/20/17   0517  08/20/17   0602   POCTGLUCOSE  309*  298*  365*  326*  284*  267*  261*  231*  224*  213*       Current Medications and/or Treatments Impacting Glycemic Control  Immunotherapy:  Immunosuppressants     None        Steroids:   Hormones     None        Pressors:    Autonomic Drugs     None        Hyperglycemia/Diabetes Medications: Antihyperglycemics     Start     Stop Route Frequency Ordered    08/20/17 1115  insulin regular (Humulin R) 100 Units in sodium chloride 0.9% 100 mL infusion      -- IV Continuous 08/20/17 1010          ASSESSMENT and " "PLAN    Uncontrolled type 1 diabetes mellitus with hyperglycemia, with long-term current use of insulin     -Type II, seen in clinic in 2016 with dx type 2; however, unable to obtain collateral and possibly Type I, per family on insulin gtt till CVA in 05/2017 and since on basal bolus per wife  -Would suspect chronic diagnosis given AKA, CAD, possible CKD  -C-peptide undetectable with BS in 400s; however, may be due to stunning and needs repeat after DKA resolved  -Agree with sending KATELIN Ab  -Anion gap closed, corrected anion gap borderline at 17 this AM but remains acidotic, would consider additional concurrent non-gap acidosis (EILEEN vs RTA4)  -TFs starting today  -If TFs increased to goal, will be able to calculate TDD of insulin based on intensive gtt rate to convert after. Continue intensive insulin gtt today       * Acute metabolic encephalopathy     -Workup per primary team  -Encephalopathy seems far out of proportion from DKA alone particularly if reported as "mild" on initial OSH presentation; however, do not have labwork to confirm  -TSH nl  -Cortisol ok       Chronic combined systolic and diastolic heart failure     -EF per report 45% with diastolic dysfunction  -On lipitor  -On beta blocker  -Not on ACE/ARB, possibly 2/2 EILEEN vs CKD     avoid hypoglycemia        CAD (coronary artery disease)     -Per primary  -On lipitor  Avoid hypoglycemia           Essential hypertension     -Managed per primary  -Long term BP goal at least <140/<80             Plan discussed with primary team      Ron Cabral IV, MD  Endocrinology  [facility], Yanci Hernandez MD,  have personally taken the history and examined the patient and agree with the resident's note as stated above.    Insulinopenic - treat as T1DM     Ketosis resolved   Continue intensive protocol  In light of nutrition changes - on enteral nutrition     "

## 2017-08-20 NOTE — PROGRESS NOTES
"Name: Brandon GABRIEL Cousin  MRN: [id number]   CSN: [id number]      Date: 08/20/2017    Chief Complaint / Interval History:   - more stable blood sugars, transferred to the floor today  - out of DKA now  - wife says has been talking more, using the phone  - still not himself  - EEG completed, no formal read yet    History of Present Illness (HPI):  66 yo M with PMHx of CAD s/p CABG, PVD with right BKA, CKD III, HTN, poorly controlled DM type II, and alcohol abuse transferred 08/18/17 from [facility] after presenting to their ED on 8/8/2017 after wife noticed acute personality changes and confusion.  She denied a strong EtOH use history and patient is reported to have only 14 beers/wk per chart but he also received chlordiazepoxide at OSH.  When in OSH ED, he was found to have DKA which was difficult to treat. He had a CT head there which showed stable changes of L temporal hemorrhage previously identified 05/12/17--"linear hyperdensity in the medial left temporal lobe and ex vacuo dilatation of the temporal horn of the left lateral ventricle were unchanged."  He also had an EEG 08/14/17 showing generalized slowing consistent with a toxic-metabolic encephalopathy rather than seizure or NCSE.  Thiamine was not checked at OSH per records, though patient was receiving thiamine 100 mg qd at OSH and here.  Patient began refusing meals at OSH and had PEG tube placement 08/17/17.  He remained confused with labile glucose and was transferred to [facility] 08/18/17.  Neurology is consulted today for continued confusion.  Currently patient has a mild leukocytosis to 13.90 k/uL with CXR noting bilateral pleural effusions and abnormal opacification in L middle/lower lobes.  UA largely unremarkable for infection with 1 WBC, rare bacteria, and negative nitrite/leukocyte esterase.  Blood cxs, urine cx pending.  VSS with Tmax 99.6 F.  This am, patient is somnolent but able to follow commands and responds to some " "simple questions.  He demonstrates no signs of nuchal rigidity and family recalls no recent head trauma apart from 05/2017 with the corresponding temporal hemorrhage on CT head.  Remainder OSH records below per 'labs/imaging' section.     Past Medical History: The patient  has a past medical history of Anticoagulant long-term use; Arthritis; Blood transfusion; CHF (congestive heart failure); Coronary artery disease; Diabetes mellitus; Hypertension; and Renal disorder.    Social History: The patient  reports that he has been smoking Cigarettes.  He has a 46.00 pack-year smoking history. He does not have any smokeless tobacco history on file. He reports that he drinks alcohol. He reports that he does not use drugs.    Family History: Their family history includes Asthma in his mother; COPD in his mother; Diabetes in his brother and sister; Heart disease in his sister; Hypertension in his father and sister; Kidney disease in his brother; No Known Problems in his brother, brother, and sister; Stroke in his father.    Allergies: Budesonide and [place]     Meds:   Scheduled Meds:   aspirin  81 mg Oral Daily    atorvastatin  40 mg Oral Daily    clopidogrel  75 mg Oral Daily    heparin (porcine)  5,000 Units Subcutaneous Q8H    metoprolol tartrate  50 mg Per G Tube BID    sodium chloride 0.9%  3 mL Intravenous Q8H    thiamine  100 mg Oral Daily     Continuous Infusions:   insulin (HUMAN R) infusion (adults) 0.5 Units/hr (08/20/17 1300)     PRN Meds:.acetaminophen, dextrose 50%, dextrose 50%, labetalol, ondansetron    Exam:  /62 (BP Location: Left arm, Patient Position: Lying)   Pulse 71   Temp 98.9 °F (37.2 °C) (Axillary)   Resp (!) 22   Ht 5' 7" (1.702 m)   Wt 62.8 kg (138 lb 7.2 oz)   SpO2 95%   BMI 21.68 kg/m²     Constitutional  Well-developed, well-nourished, appears stated age, sitting  Up in bed, eyes closed   Cardiovascular  Radial pulses 2+ and symmetric, no LE edema bilaterally   Neurological  "   * Mental status  opens eyes to voice, but very somnolent, follows one step commands, attempts to speak   * Cranial nerves  pupils 2-1 B, + corneals, + overcomes OCRs, no clear facial palsy, gag not assessed   * Motor  Muscle bulk normal, resists with symmetric 4/5 throhghout   * Sensory   Grimace to pinch x 4 ext   * Coordination  No myoclonus or tremor   * Gait  Not OOB   * Deep tendon reflexes  1+ and symmetric throughout   Babinski downgoing bilaterally     Laboratory/Radiological:  - Results:  Admission on 08/18/2017   No results displayed because visit has over 200 results.        - Independent review of images:      EEG prelim: slow, diffuse, no epileptiform discharges    Diagnoses:          Likely multifactorial delirium in a brittle diabetic and ?alcohol dependence.  No nuchal rigidity, doubt CNS infection.       Even though his temporal lobe is affected, without clear seizure or supportive EEG data, I would NOT treat with an AED.    Would continue level of supportive care as you are and expect gradual improvement.    Please call with questions.    Ronnie Jasmine MD, MPH  Division of Movement and Memory Disorders  [facility]  [phone]

## 2017-08-20 NOTE — ASSESSMENT & PLAN NOTE
Normocytic anemia likely due to anemia of chronic disease with MCV of 85  H/H 10.8/31.9 down from admission likely due to volume expansion  Continue to monitor daily CBC

## 2017-08-20 NOTE — NURSING TRANSFER
Nursing Transfer Note      8/20/2017     Transfer From: 3097    Transfer via bed    Transfer with NC 2L to O2, cardiac monitoring    Transported by Emilie Duckworth RN     Medicines sent: Insulin pen    Chart send with patient: Yes    Notified: spouse    Patient reassessed at: 08/20/17 1350     Upon arrival to floor: cardiac monitor applied, patient oriented to room, call bell in reach and bed in lowest position

## 2017-08-20 NOTE — PROGRESS NOTES
Spoke with Bindu with Hospital Medicine. Patient to be stepped down to the floor.            VANESSA Henson  PGY-3  Critical Care Medicine  Spectralink: [phone]

## 2017-08-20 NOTE — SUBJECTIVE & OBJECTIVE
Interval History/Significant Events:   NAEON. Patient out of DKA this am.     Review of Systems   Unable to obtain given patient's factors.    Objective:     Vital Signs (Most Recent):  Temp: 97.8 °F (36.6 °C) (08/19/17 2300)  Pulse: 78 (08/20/17 0100)  Resp: (!) 23 (08/20/17 0100)  BP: (!) 165/99 (08/20/17 0100)  SpO2: (!) 78 % (08/19/17 2300) Vital Signs (24h Range):  Temp:  [97.8 °F (36.6 °C)-98.4 °F (36.9 °C)] 97.8 °F (36.6 °C)  Pulse:  [74-87] 78  Resp:  [18-28] 23  SpO2:  [78 %-97 %] 78 %  BP: (154-185)/(69-99) 165/99   Weight: 62.8 kg (138 lb 7.2 oz)  Body mass index is 21.68 kg/m².      Intake/Output Summary (Last 24 hours) at 08/20/17 0641  Last data filed at 08/20/17 0200   Gross per 24 hour   Intake          2282.15 ml   Output             1815 ml   Net           467.15 ml       Physical Exam  General: alert, non-verbal and does not follow commands, appears to be in NAD  Eyes: conjunctivae/corneas clear. PERRL. EOMI.  Neck: supple, symmetrical, trachea midline, no JVD  Cardiovascular: Heart: regular rate and rhythm, S1, S2 normal, no murmur, click, rub or gallop.  Lungs: clear to auscultation bilaterally and normal respiratory effort  Chest Wall: no tenderness, median sternotomy appreciated  Abdomen/Rectal: Abdomen: Non distended. + BS. No masses. No TTP. No rebound or guarding. Rectal: not examined. PEG tube in place with no abnormalities. .   Extremities: right AKA, scars of harvested left great saphenous vein, there is no edema, no redness or tenderness in the calves or thighs. Pulses: 2+ and symmetric  Skin: Skin color, texture, turgor normal. No rashes or lesions  Musculoskeletal: full range of motion of joints  Lymph Nodes: No cervical or supraclavicular adenopathy  Psych/Behavioral: alert, patient is non-communicative, intentional hypertonia felt, right knee reflex brisk compared to left knee.     Vents:     Lines/Drains/Airways     Drain                 Gastrostomy/Enterostomy 08/17/17  Percutaneous endoscopic gastrostomy (PEG) other (see comments) feeding 3 days         Urethral Catheter 08/18/17 2200 1 day          Peripheral Intravenous Line                 Peripheral IV - Single Lumen 08/19/17 0130 Left Hand 1 day         Peripheral IV - Single Lumen 08/19/17 0300 Right Hand 1 day              Significant Labs:    CBC/Anemia Profile:    Recent Labs  Lab 08/18/17  2305 08/19/17  0437 08/19/17  0930 08/20/17  0330   WBC 14.04* 13.90*  --  11.58   HGB 11.7* 12.5*  --  10.8*   HCT 35.0* 37.2*  --  31.9*    267  --  215   MCV 86 87  --  85   RDW 14.5 14.6*  --  14.3   [id number]  --   --  1454*  --         Chemistries:    Recent Labs  Lab 08/18/17  2305 08/19/17  0437  08/19/17  1130  08/19/17  2036 08/20/17  0032 08/20/17  0330    144  144  < > 143  143  < > 142 140 141   K 4.5 4.9  4.9  < > 4.1  4.1  < > 3.6 3.8 3.9   * 116*  116*  < > 115*  115*  < > 115* 114* 115*   CO2 13* 11*  11*  < > 17*  17*  < > 17* 19* 18*   BUN 19 20  20  < > 19  19  < > 17 15 16   CREATININE 1.9* 2.0*  2.0*  < > 1.8*  1.8*  < > 1.6* 1.6* 1.6*   CALCIUM 9.7 9.6  9.6  < > 9.3  9.3  < > 9.0 8.8 8.9   ALBUMIN 2.0* 2.0*  --  1.9*  --   --   --   --    PROT 6.7 6.8  --  6.2  --   --   --   --    BILITOT 0.3 0.3  --  0.3  --   --   --   --    ALKPHOS 128 133  --  112  --   --   --   --    ALT 32 30  --  27  --   --   --   --    AST 24 28  --  23  --   --   --   --    MG 1.2* 1.7  --   --   --   --   --  1.6   PHOS 2.3* 2.9  --   --   --   --   --  2.7   < > = values in this interval not displayed.      Significant Imaging:  MRI brain 8/19/2017:    1.  No evidence of acute intracranial abnormality on this noncontrast MRI examination.    2.  Abnormal gradient susceptibility with associated volume loss involving the anterior left temporal lobe with ex vacuo dilatation of the temporal horn of the left lateral ventricle.  Findings likely relate to sequela prior trauma/hemorrhage.    3.  Generalized  cerebral volume loss, greater than anticipated for the patient's reported chronologic age, remote lacunar type infarcts and chronic microvascular ischemic change.

## 2017-08-20 NOTE — PLAN OF CARE
Problem: Patient Care Overview  Goal: Plan of Care Review  Pt AAOx1, vs stable, no falls or complications, remains on tele in NSR, orellana in place, wife at bedside, will continue to monitor    Problem: Restraint, Nonbehavioral (nonviolent)  Intervention: Restraint Injuries Monitor Q2 hours  Pulses present, no skin breakdown noted, Passive ROM performed

## 2017-08-20 NOTE — PROGRESS NOTES
"[facility]  Critical Care Medicine  Progress Note    Patient Name: Brandon Simmons  MRN: [id number]  Admission Date: 8/18/2017  Hospital Length of Stay: 2 days  Code Status: Full Code  Attending Provider: Linda Mercado MD  Primary Care Provider: Neville Willis MD   Principal Problem: Acute metabolic encephalopathy    Subjective:     HPI:  This is a 67 year old male with pmhx significant for CAD s/p CABG, PVD with right BKA, CKD stage III, and HTN, DM type II with labile glycemic control, and alcohol abuse who is transferred from [facility] after presenting to their ED on 8/8/2017 after wife noticed acute personality changes and confusion. In the ED, the patient was noted to be in "mild" DKA that responded drastically to subcutaneous insulin prior to initiating insulin infusion. The patient was admitted to the ICU for closer monitoring. The patient's confusion was progressive as patient manifested combativeness and abrupt agitation. With this, the patient became non-verbal. The medical team found great difficulty in controlling the predominantly hyperglycemic state as the patient would become severely hypoglycemic to the 30's with small amount of subcutaneous insulin. The patient manifested hyperglycemia predominantly despite refusing PO intake for more than 4 consecutive days. Eventually, a PEG tube was placed on 8/17/2017 to force caloric intake. CT of the head was non revealing other than for chronic ischemic changes and signs of previous stable intracranial bleeding. MRI brain and lumbar puncture were not done due to patient being on restraints and lack of signs of infection, respectively. Neurology was consulted at the OSH who suspected multifactorial metabolic encephalopathy given patient's recent alcohol abuse that was initially denied by the patient's wife on admission. The patient transiently received Librium and prn Ativan for suspected alcohol withdrawal with " no notable improvement. The [place] [facility] [place] and further neurological workup.  This HPI was obtained through chart review and sign out from transferring house staff given patient's factors and absence of collateral history.       Hospital/ICU Course:  Admitted on 8/18/2017 for acute metabolic encephalopathy found to be in DKA and started on DKA protocol with insulin infusion. Endocrine workup revealed a diagnosis of DM type I. DKA had resolved by second day of admission.     Interval History/Significant Events:   NAEON. Patient out of DKA this am.     Review of Systems   Unable to obtain given patient's factors.    Objective:     Vital Signs (Most Recent):  Temp: 97.8 °F (36.6 °C) (08/19/17 2300)  Pulse: 78 (08/20/17 0100)  Resp: (!) 23 (08/20/17 0100)  BP: (!) 165/99 (08/20/17 0100)  SpO2: (!) 78 % (08/19/17 2300) Vital Signs (24h Range):  Temp:  [97.8 °F (36.6 °C)-98.4 °F (36.9 °C)] 97.8 °F (36.6 °C)  Pulse:  [74-87] 78  Resp:  [18-28] 23  SpO2:  [78 %-97 %] 78 %  BP: (154-185)/(69-99) 165/99   Weight: 62.8 kg (138 lb 7.2 oz)  Body mass index is 21.68 kg/m².      Intake/Output Summary (Last 24 hours) at 08/20/17 0641  Last data filed at 08/20/17 0200   Gross per 24 hour   Intake          2282.15 ml   Output             1815 ml   Net           467.15 ml       Physical Exam  General: alert, non-verbal and does not follow commands, appears to be in NAD  Eyes: conjunctivae/corneas clear. PERRL. EOMI.  Neck: supple, symmetrical, trachea midline, no JVD  Cardiovascular: Heart: regular rate and rhythm, S1, S2 normal, no murmur, click, rub or gallop.  Lungs: clear to auscultation bilaterally and normal respiratory effort  Chest Wall: no tenderness, median sternotomy appreciated  Abdomen/Rectal: Abdomen: Non distended. + BS. No masses. No TTP. No rebound or guarding. Rectal: not examined. PEG tube in place with no abnormalities. .   Extremities: right AKA, scars of harvested left  great saphenous vein, there is no edema, no redness or tenderness in the calves or thighs. Pulses: 2+ and symmetric  Skin: Skin color, texture, turgor normal. No rashes or lesions  Musculoskeletal: full range of motion of joints  Lymph Nodes: No cervical or supraclavicular adenopathy  Psych/Behavioral: alert, patient is non-communicative, intentional hypertonia felt, right knee reflex brisk compared to left knee.     Vents:     Lines/Drains/Airways     Drain                 Gastrostomy/Enterostomy 08/17/17 Percutaneous endoscopic gastrostomy (PEG) other (see comments) feeding 3 days         Urethral Catheter 08/18/17 2200 1 day          Peripheral Intravenous Line                 Peripheral IV - Single Lumen 08/19/17 0130 Left Hand 1 day         Peripheral IV - Single Lumen 08/19/17 0300 Right Hand 1 day              Significant Labs:    CBC/Anemia Profile:    Recent Labs  Lab 08/18/17  2305 08/19/17  0437 08/19/17  0930 08/20/17  0330   WBC 14.04* 13.90*  --  11.58   HGB 11.7* 12.5*  --  10.8*   HCT 35.0* 37.2*  --  31.9*    267  --  215   MCV 86 87  --  85   RDW 14.5 14.6*  --  14.3   [id number]  --   --  1454*  --         Chemistries:    Recent Labs  Lab 08/18/17  2305 08/19/17  0437  08/19/17  1130  08/19/17  2036 08/20/17  0032 08/20/17  0330    144  144  < > 143  143  < > 142 140 141   K 4.5 4.9  4.9  < > 4.1  4.1  < > 3.6 3.8 3.9   * 116*  116*  < > 115*  115*  < > 115* 114* 115*   CO2 13* 11*  11*  < > 17*  17*  < > 17* 19* 18*   BUN 19 20  20  < > 19  19  < > 17 15 16   CREATININE 1.9* 2.0*  2.0*  < > 1.8*  1.8*  < > 1.6* 1.6* 1.6*   CALCIUM 9.7 9.6  9.6  < > 9.3  9.3  < > 9.0 8.8 8.9   ALBUMIN 2.0* 2.0*  --  1.9*  --   --   --   --    PROT 6.7 6.8  --  6.2  --   --   --   --    BILITOT 0.3 0.3  --  0.3  --   --   --   --    ALKPHOS 128 133  --  112  --   --   --   --    ALT 32 30  --  27  --   --   --   --    AST 24 28  --  23  --   --   --   --    MG 1.2* 1.7  --   --   --    --   --  1.6   PHOS 2.3* 2.9  --   --   --   --   --  2.7   < > = values in this interval not displayed.      Significant Imaging:  MRI brain 8/19/2017:    1.  No evidence of acute intracranial abnormality on this noncontrast MRI examination.    2.  Abnormal gradient susceptibility with associated volume loss involving the anterior left temporal lobe with ex vacuo dilatation of the temporal horn of the left lateral ventricle.  Findings likely relate to sequela prior trauma/hemorrhage.    3.  Generalized cerebral volume loss, greater than anticipated for the patient's reported chronologic age, remote lacunar type infarcts and chronic microvascular ischemic change.        Assessment/Plan:     Neuro   * Acute metabolic encephalopathy    Likely multifactorial with acute on chronic uncontrolled glycemic indices being the most likely cause  CT head x2 at OSH were unrevealing  MRI without contrast showed no signs of acute intracranial abnormalities.   EEG results pending  neuro following appreciate assistance  Delirium precautions and neurochecks [id number]  Use chemo restraints for agitation              History of Left-sided intracerebral hemorrhage    As evident by MRI brain  Mild right sided weakness  PT/OT           Psychiatric   Alcohol intake above recommended sensible limits    Less likely to be contributing to current mental picture given long duration since last drink        Cardiac/Vascular   PVD (peripheral vascular disease)    S/p right BKA  Continue plavix and lipitor        Chronic combined systolic and diastolic heart failure    EF 45-50% with diastolic dysfunction  Continue lopressor 50mg bid   resuming lisinopril 20mg daily         Essential hypertension    Poorly controlled overnight  Continue BB with lopressor and resume lisinopril now with improving Cr to 1.6 today  Prn IV labetalol prn        CAD (coronary artery disease)    Hx of CABG  2D echo with EF 45-50% with diastolic dysfunction  Continue  plavix and ToprolXL (lopressor now given meds being administered via PEG tube)             Elevated troponin    Trend is stable with no EKG changes suggestive of ischemia          Renal/   EILEEN (acute kidney injury)    As per CKD stage III        CKD (chronic kidney disease) stage 3, GFR 30-59 ml/min    Likely due to diabetic and hypertensive nephropathy  Unknown Cr baseline but is improving to 1.6 today from being as high as 2.0 at OSH  US renal with signs of medical disease  Renally dose medications  Heparin for DVT ppx             High anion gap metabolic acidosis    Due to DKA  Resolving   Bicarb today at 18 and AG has closed at 8        Oncology   Anemia    Normocytic anemia likely due to anemia of chronic disease with MCV of 85  H/H 10.8/31.9 down from admission likely due to volume expansion  Continue to monitor daily CBC        Leukocytosis    Likely stress related as cultures have shown no growth yet and afebrility   Improving to 11.58 from 14           Endocrine   Diabetic ketoacidosis    Reported brittle DM type ii however labs and hx confirm type 1 DM  DKA resolving   C peptide level unmeasurable likely due to patient being type 1 DM, KATELIN pending  Glucagon and hypoglycemic agent screening level pending  On insulin infusion per DKA protocol. Plan to transition to subcutaneous insulin today. Endocrine following appreciate assistance.            Critical Care Daily Checklist:    A: Awake: RASS Goal/Actual Goal:    Actual: Faith Agitation Sedation Scale (RASS): Restless   B: Spontaneous Breathing Trial Performed?     C: SAT & SBT Coordinated?  n/a                      D: Delirium: CAM-ICU Overall CAM-ICU: Positive   E: Early Mobility Performed? yes   F: Feeding Goal: Goals: Meet % EEN, EPN  Status: Nutrition Goal Status: new   Current Diet Order   Procedures    Diet NPO Except for: Sips with Medication     Order Specific Question:   Except for     Answer:   Sips with Medication      AS:  Analgesia/Sedation prn   T: Thromboembolic Prophylaxis On heparin subq tid   H: HOB > 300    U: Stress Ulcer Prophylaxis (if needed) no   G: Glucose Control Insulin infusion --> plan for SC today   B: Bowel Function     I: Indwelling Catheter (Lines & Ríos) Necessity Yes    D: De-escalation of Antimicrobials/Pharmacotherapies n/a    Plan for the day/ETD Transition to subcutaneous insulin and continue following on neurological recovery    Code Status:  Family/Goals of Care: Full Code  full          VANESSA Gutierrez  Critical Care Medicine  [facility]

## 2017-08-20 NOTE — HOSPITAL COURSE
Admitted on 8/18/2017 for acute metabolic encephalopathy found to be in DKA and started on DKA protocol with insulin infusion. Endocrine workup revealed a diagnosis of DM type I. DKA had resolved by second day of admission.

## 2017-08-20 NOTE — PLAN OF CARE
Problem: Patient Care Overview  Goal: Plan of Care Review  Pt aaox1, asleep, follows commands, vs stable no falls or complications during shift,algorithm followed per protocol for  continuous insulin drip, accu q2, orellana in place 20ml measured, will continue to monitor

## 2017-08-20 NOTE — ASSESSMENT & PLAN NOTE
EF 45-50% with diastolic dysfunction  Continue lopressor 50mg bid   resuming lisinopril 20mg daily

## 2017-08-20 NOTE — RESIDENT HANDOFF
Handoff     Primary Team: Networked reference to record PCT  Room Number: 3097/3097 A     Patient Name: Brandon Jinsin MRN: [id number]     Date of Birth: [id number] Allergies: Budesonide and Cobalt     Age: 67 y.o. Admit Date: 8/18/2017     Sex: male  BMI: Body mass index is 21.68 kg/m².     Code Status: Full Code        Illness Level (current clinical status): No    Reason for Admission: Acute metabolic encephalopathy    Brief HPI and Hospital Course:    This is a 67 year old male with pmhx significant for CAD s/p CABG, PVD with right BKA, CKD stage III, HTN, brittle DM type I with labile glycemic control, (recently only on sliding scale insulin), left temporal bleed with mild right residual weakness who was transferred from [facility] after presenting there on 8/8/2017 with metabolic encephalopathy in the setting of DKA transferred to [facility] [place] [facility] for higher level of care and endocrine evaluation.     Patient was found to be in DKA on arrival and started on insulin infusion. Neurological workup were grossly unremarkable for any acute changes. EEG still pending. Patient with notable improvement in mental status on second day of admission here with achieving better glycemic control and DKA resolution. The patient was started on tube feeds through recently placed PEG tube and started on transitional insulin infusion by endocrine. The patient is being transferred to hospital medicine to continue further workup of encephalopathy and follow on endocrine for discharge insulin regiment.        Tasks (specific, using if-then statements):   -- Acute metabolic encephalopathy: follow EEG and neuro rec's.  -- DKA and brittle type 1 DM: space out labs based on glycemic control and follow on endocrine rec's.  -- consult SW/CM for discharge dispo        VANESSA Henson  PGY-3  Critical Care Medicine  Spectralink: [phone]

## 2017-08-20 NOTE — ASSESSMENT & PLAN NOTE
Poorly controlled overnight  Continue BB with lopressor and resume lisinopril now with improving Cr to 1.6 today  Prn IV labetalol prn

## 2017-08-20 NOTE — ASSESSMENT & PLAN NOTE
Likely due to diabetic and hypertensive nephropathy  Unknown Cr baseline but is improving to 1.6 today from being as high as 2.0 at OSH  US renal with signs of medical disease  Renally dose medications  Heparin for DVT ppx

## 2017-08-20 NOTE — ASSESSMENT & PLAN NOTE
Likely stress related as cultures have shown no growth yet and afebrility   Improving to 11.58 from 14

## 2017-08-20 NOTE — SUBJECTIVE & OBJECTIVE
"Interval HPI:   Overnight events: none, remains on intensive gtt. Confusion slightly improved but only oriented to name. TFs planning to start today  Eating:   NPO  Nausea: No  Hypoglycemia and intervention: No  Fever: No  TPN and/or TF: No  If yes, type of TF/TPN and rate: Planning diabetasource continuous with goal rate 50    BP (!) 168/75 (BP Location: Left arm, Patient Position: Lying)   Pulse 78   Temp 99 °F (37.2 °C) (Axillary)   Resp (!) 26   Ht 5' 7" (1.702 m)   Wt 62.8 kg (138 lb 7.2 oz)   SpO2 95%   BMI 21.68 kg/m²     Labs Reviewed and Include      Recent Labs  Lab 08/19/17  1130  08/20/17  0900   *  243*  < > 198*   CALCIUM 9.3  9.3  < > 8.8   ALBUMIN 1.9*  --   --    PROT 6.2  --   --      143  < > 141   K 4.1  4.1  < > 3.8   CO2 17*  17*  < > 19*   *  115*  < > 115*   BUN 19  19  < > 14   CREATININE 1.8*  1.8*  < > 1.5*   ALKPHOS 112  --   --    ALT 27  --   --    AST 23  --   --    BILITOT 0.3  --   --    < > = values in this interval not displayed.  Lab Results   Component Value Date    WBC 11.58 08/20/2017    HGB 10.8 (L) 08/20/2017    HCT 31.9 (L) 08/20/2017    MCV 85 08/20/2017     08/20/2017       Recent Labs  Lab 08/19/17  0930   TSH 2.131     Lab Results   Component Value Date    HGBA1C 10.0 (H) 08/18/2017       Nutritional status:   Body mass index is 21.68 kg/m².  Lab Results   Component Value Date    ALBUMIN 1.9 (L) 08/19/2017    ALBUMIN 2.0 (L) 08/19/2017    ALBUMIN 2.0 (L) 08/18/2017     No results found for: PREALBUMIN    Estimated Creatinine Clearance: 42.4 mL/min (based on Cr of 1.5).    Accu-Checks  Recent Labs      08/19/17   2105  08/19/17   2216  08/19/17   2311  08/20/17   0006  08/20/17   0105  08/20/17   0206  08/20/17   0319  08/20/17   0408  08/20/17   0517  08/20/17   0602   POCTGLUCOSE  309*  298*  365*  326*  284*  267*  261*  231*  224*  213*       Current Medications and/or Treatments Impacting Glycemic Control  Immunotherapy:  " Immunosuppressants     None        Steroids:   Hormones     None        Pressors:    Autonomic Drugs     None        Hyperglycemia/Diabetes Medications: Antihyperglycemics     Start     Stop Route Frequency Ordered    08/20/17 1115  insulin regular (Humulin R) 100 Units in sodium chloride 0.9% 100 mL infusion      -- IV Continuous 08/20/17 1010

## 2017-08-20 NOTE — ASSESSMENT & PLAN NOTE
Hx of CABG  2D echo with EF 45-50% with diastolic dysfunction  Continue plavix and ToprolXL (lopressor now given meds being administered via PEG tube)

## 2017-08-20 NOTE — ASSESSMENT & PLAN NOTE
Likely multifactorial with acute on chronic uncontrolled glycemic indices being the most likely cause  CT head x2 at OSH were unrevealing  MRI without contrast showed no signs of acute intracranial abnormalities.   EEG results pending  neuro following appreciate assistance  Delirium precautions and neurochecks [id number]  Use chemo restraints for agitation

## 2017-08-20 NOTE — ASSESSMENT & PLAN NOTE
Reported brittle DM type ii however labs and hx confirm type 1 DM  DKA resolving   C peptide level unmeasurable likely due to patient being type 1 DM, KATELIN pending  Glucagon and hypoglycemic agent screening level pending  On insulin infusion per DKA protocol. Plan to transition to subcutaneous insulin today. Endocrine following appreciate assistance.

## 2017-08-20 NOTE — PROCEDURES
DATE OF PROCEDURE:  08/19/2017    EEG #:  FH-.    REFERRING PHYSICIAN:  Dr. Griffith.    This EEG was performed to assess for subclinical seizure.    ELECTROENCEPHALOGRAM REPORT     METHODOLOGY:  Electroencephalographic (EEG) recording is recorded with   electrodes placed according to the International 10-20 placement system.  Thirty   two (32) channels of digital signal (sampling rate of 512/sec), including T1   and T2, were simultaneously recorded from the scalp and may include EKG, EMG,   and/or eye monitors.  Recording band pass was 0.1 to 512 Hz.  Digital video   recording of the patient is simultaneously recorded with the EEG.  The patient   is instructed to report clinical symptoms which may occur during the recording   session.  EEG and video recording are stored and archived in digital format.    Activation procedures, which include photic stimulation, hyperventilation and   instructing patients to perform simple tasks, are done in selected patients  The EEG is displayed on a monitor screen and can be reviewed using different   montages.  Computer assisted-analysis is employed to detect spike and   electrographic seizure activity.   The entire record is submitted for computer   analysis.  The entire recording is visually reviewed, and the times identified   by computer analysis as being spikes or seizures are reviewed again.    Compressed spectral analysis (CSA) is also performed on the activity recorded   from each individual channel.  This is displayed as a power display of   frequencies from 0 to 30 Hz over time.   The CSA is reviewed looking for   asymmetries in power between homologous areas of the scalp, then compared with   the original EEG recording.    [employer] software was also utilized in the review of this study.  This software   suite analyzes the EEG recording in multiple domains.  Coherence and rhythmicity   are computed to identify EEG sections which may contain organized seizures.     Each channel undergoes analysis to detect the presence of spike and sharp waves   which have special and morphological characteristics of epileptic activity.  The   routine EEG recording is converted from special into frequency domain.  This is   then displayed comparing homologous areas to identify areas of significant   asymmetry.  Algorithm to identify non-cortically generated artifact is used to   separate artifact from the EEG.      EEG FINDINGS:  The recording was obtained with a number of standard bipolar and   referential montages during catatonic state.  In this state, the background was   diffusely disorganized and comprised of a 7 Hz activity in the posterior   regions, which reacted to eye opening.  Intermixed theta range slowing was noted   throughout the record.  During deeper stages of clinical sleep, symmetric sleep   spindles were noted.  There were no interictal epileptiform abnormalities.  No   clinical or electrographic seizures were recorded.  Activation procedures were   not performed.    The EKG channel revealed sinus rhythm.    IMPRESSION:  This is an abnormal EEG during catatonic state.  Diffuse slowing   and disorganization of the background was noted.    CLINICAL CORRELATION:  The patient is a 67-year-old male who presented with a   history of confusion, combativeness and agitation.  The patient has altered   sensorium.  This is an abnormal EEG during catatonic state.  The overall degree   of slowing and disorganization is suggestive of a mild-to-moderate   encephalopathy, nonspecific to the cause.  There is no evidence of an epileptic   process on this recording.  No seizures were recorded during this study.      FAK/HN  dd: 08/20/2017 14:41:00 (CDT)  td: 08/20/2017 15:14:09 (CDT)  Doc ID   #[id number]  Job ID #[id number]    CC:

## 2017-08-21 PROBLEM — Z78.9 ON TUBE FEEDING DIET: Status: ACTIVE | Noted: 2017-08-21

## 2017-08-21 PROBLEM — R68.89 SUSPECTED DEEP TISSUE INJURY: Status: ACTIVE | Noted: 2017-08-21

## 2017-08-21 LAB
ANION GAP SERPL CALC-SCNC: 5 MMOL/L
ANION GAP SERPL CALC-SCNC: 6 MMOL/L
ANION GAP SERPL CALC-SCNC: 7 MMOL/L
ANION GAP SERPL CALC-SCNC: 8 MMOL/L
BACTERIA #/AREA URNS AUTO: ABNORMAL /HPF
BASOPHILS # BLD AUTO: 0.03 K/UL
BASOPHILS NFR BLD: 0.2 %
BILIRUB UR QL STRIP: NEGATIVE
BUN SERPL-MCNC: 17 MG/DL
BUN SERPL-MCNC: 20 MG/DL
BUN SERPL-MCNC: 20 MG/DL
BUN SERPL-MCNC: 22 MG/DL
BUN SERPL-MCNC: 22 MG/DL
BUN SERPL-MCNC: 23 MG/DL
CALCIUM SERPL-MCNC: 8.5 MG/DL
CALCIUM SERPL-MCNC: 8.6 MG/DL
CALCIUM SERPL-MCNC: 9 MG/DL
CALCIUM SERPL-MCNC: 9.3 MG/DL
CHLORIDE SERPL-SCNC: 109 MMOL/L
CHLORIDE SERPL-SCNC: 110 MMOL/L
CHLORIDE SERPL-SCNC: 112 MMOL/L
CHLORIDE SERPL-SCNC: 112 MMOL/L
CHLORIDE SERPL-SCNC: 113 MMOL/L
CHLORIDE SERPL-SCNC: 113 MMOL/L
CLARITY UR REFRACT.AUTO: CLEAR
CO2 SERPL-SCNC: 20 MMOL/L
CO2 SERPL-SCNC: 21 MMOL/L
CO2 SERPL-SCNC: 21 MMOL/L
CO2 SERPL-SCNC: 22 MMOL/L
CO2 SERPL-SCNC: 22 MMOL/L
CO2 SERPL-SCNC: 24 MMOL/L
COLOR UR AUTO: YELLOW
CREAT SERPL-MCNC: 1.8 MG/DL
CREAT SERPL-MCNC: 1.8 MG/DL
CREAT SERPL-MCNC: 1.9 MG/DL
CREAT UR-MCNC: 119 MG/DL
DIFFERENTIAL METHOD: ABNORMAL
EOSINOPHIL # BLD AUTO: 0.3 K/UL
EOSINOPHIL NFR BLD: 1.8 %
ERYTHROCYTE [DISTWIDTH] IN BLOOD BY AUTOMATED COUNT: 14.4 %
EST. GFR  (AFRICAN AMERICAN): 41.3 ML/MIN/1.73 M^2
EST. GFR  (AFRICAN AMERICAN): 44 ML/MIN/1.73 M^2
EST. GFR  (AFRICAN AMERICAN): 44 ML/MIN/1.73 M^2
EST. GFR  (NON AFRICAN AMERICAN): 35.7 ML/MIN/1.73 M^2
EST. GFR  (NON AFRICAN AMERICAN): 38.1 ML/MIN/1.73 M^2
EST. GFR  (NON AFRICAN AMERICAN): 38.1 ML/MIN/1.73 M^2
GLUCOSE SERPL-MCNC: 224 MG/DL
GLUCOSE SERPL-MCNC: 240 MG/DL
GLUCOSE SERPL-MCNC: 266 MG/DL
GLUCOSE SERPL-MCNC: 271 MG/DL
GLUCOSE SERPL-MCNC: 291 MG/DL
GLUCOSE SERPL-MCNC: 302 MG/DL
GLUCOSE UR QL STRIP: ABNORMAL
HCT VFR BLD AUTO: 35.1 %
HGB BLD-MCNC: 11.7 G/DL
HGB UR QL STRIP: ABNORMAL
HYALINE CASTS UR QL AUTO: 6 /LPF
KETONES UR QL STRIP: NEGATIVE
LEUKOCYTE ESTERASE UR QL STRIP: NEGATIVE
LYMPHOCYTES # BLD AUTO: 1.4 K/UL
LYMPHOCYTES NFR BLD: 8.9 %
MAGNESIUM SERPL-MCNC: 1.7 MG/DL
MCH RBC QN AUTO: 28.7 PG
MCHC RBC AUTO-ENTMCNC: 33.3 G/DL
MCV RBC AUTO: 86 FL
MICROSCOPIC COMMENT: ABNORMAL
MONOCYTES # BLD AUTO: 0.8 K/UL
MONOCYTES NFR BLD: 5.1 %
NEUTROPHILS # BLD AUTO: 13.1 K/UL
NEUTROPHILS NFR BLD: 83.3 %
NITRITE UR QL STRIP: NEGATIVE
PH UR STRIP: 5 [PH] (ref 5–8)
PHOSPHATE SERPL-MCNC: 2.9 MG/DL
PLATELET # BLD AUTO: 259 K/UL
PMV BLD AUTO: 10.4 FL
POCT GLUCOSE: 205 MG/DL (ref 70–110)
POCT GLUCOSE: 210 MG/DL (ref 70–110)
POCT GLUCOSE: 213 MG/DL (ref 70–110)
POCT GLUCOSE: 246 MG/DL (ref 70–110)
POCT GLUCOSE: 252 MG/DL (ref 70–110)
POCT GLUCOSE: 267 MG/DL (ref 70–110)
POCT GLUCOSE: 291 MG/DL (ref 70–110)
POCT GLUCOSE: 303 MG/DL (ref 70–110)
POCT GLUCOSE: 327 MG/DL (ref 70–110)
POTASSIUM SERPL-SCNC: 3.8 MMOL/L
POTASSIUM SERPL-SCNC: 3.9 MMOL/L
POTASSIUM SERPL-SCNC: 4 MMOL/L
POTASSIUM SERPL-SCNC: 4.6 MMOL/L
PROT UR QL STRIP: ABNORMAL
RBC # BLD AUTO: 4.08 M/UL
RBC #/AREA URNS AUTO: 16 /HPF (ref 0–4)
RPR SER QL: NORMAL
SODIUM SERPL-SCNC: 136 MMOL/L
SODIUM SERPL-SCNC: 139 MMOL/L
SODIUM SERPL-SCNC: 140 MMOL/L
SODIUM SERPL-SCNC: 140 MMOL/L
SODIUM SERPL-SCNC: 141 MMOL/L
SODIUM SERPL-SCNC: 143 MMOL/L
SODIUM UR-SCNC: 98 MMOL/L
SP GR UR STRIP: 1.01 (ref 1–1.03)
URN SPEC COLLECT METH UR: ABNORMAL
UROBILINOGEN UR STRIP-ACNC: NEGATIVE EU/DL
UUN UR-MCNC: 687 MG/DL
WBC # BLD AUTO: 15.7 K/UL
WBC #/AREA URNS AUTO: 1 /HPF (ref 0–5)
YEAST UR QL AUTO: ABNORMAL

## 2017-08-21 PROCEDURE — 36415 COLL VENOUS BLD VENIPUNCTURE: CPT

## 2017-08-21 PROCEDURE — 81001 URINALYSIS AUTO W/SCOPE: CPT

## 2017-08-21 PROCEDURE — 97165 OT EVAL LOW COMPLEX 30 MIN: CPT

## 2017-08-21 PROCEDURE — A4216 STERILE WATER/SALINE, 10 ML: HCPCS | Performed by: INTERNAL MEDICINE

## 2017-08-21 PROCEDURE — 63600175 PHARM REV CODE 636 W HCPCS: Performed by: HOSPITALIST

## 2017-08-21 PROCEDURE — 84540 ASSAY OF URINE/UREA-N: CPT

## 2017-08-21 PROCEDURE — 63600175 PHARM REV CODE 636 W HCPCS: Performed by: INTERNAL MEDICINE

## 2017-08-21 PROCEDURE — 84300 ASSAY OF URINE SODIUM: CPT

## 2017-08-21 PROCEDURE — 80048 BASIC METABOLIC PNL TOTAL CA: CPT | Mod: 91

## 2017-08-21 PROCEDURE — 97162 PT EVAL MOD COMPLEX 30 MIN: CPT

## 2017-08-21 PROCEDURE — 85025 COMPLETE CBC W/AUTO DIFF WBC: CPT

## 2017-08-21 PROCEDURE — G8979 MOBILITY GOAL STATUS: HCPCS | Mod: CM

## 2017-08-21 PROCEDURE — 99233 SBSQ HOSP IP/OBS HIGH 50: CPT | Mod: ,,, | Performed by: HOSPITALIST

## 2017-08-21 PROCEDURE — G8978 MOBILITY CURRENT STATUS: HCPCS | Mod: CM

## 2017-08-21 PROCEDURE — 20600001 HC STEP DOWN PRIVATE ROOM

## 2017-08-21 PROCEDURE — 99232 SBSQ HOSP IP/OBS MODERATE 35: CPT | Mod: ,,, | Performed by: INTERNAL MEDICINE

## 2017-08-21 PROCEDURE — 82570 ASSAY OF URINE CREATININE: CPT

## 2017-08-21 PROCEDURE — 84100 ASSAY OF PHOSPHORUS: CPT

## 2017-08-21 PROCEDURE — 83735 ASSAY OF MAGNESIUM: CPT

## 2017-08-21 PROCEDURE — 25000003 PHARM REV CODE 250: Performed by: INTERNAL MEDICINE

## 2017-08-21 PROCEDURE — 97112 NEUROMUSCULAR REEDUCATION: CPT

## 2017-08-21 PROCEDURE — 51798 US URINE CAPACITY MEASURE: CPT

## 2017-08-21 RX ORDER — GLUCAGON 1 MG
1 KIT INJECTION
Status: DISCONTINUED | OUTPATIENT
Start: 2017-08-21 | End: 2017-08-23

## 2017-08-21 RX ORDER — ATORVASTATIN CALCIUM 80 MG/1
80 TABLET, FILM COATED ORAL NIGHTLY
COMMUNITY
End: 2020-02-03 | Stop reason: DRUGHIGH

## 2017-08-21 RX ORDER — LOSARTAN POTASSIUM 100 MG/1
100 TABLET ORAL DAILY
Status: ON HOLD | COMMUNITY
End: 2017-08-25 | Stop reason: HOSPADM

## 2017-08-21 RX ORDER — LEVOTHYROXINE SODIUM 25 UG/1
25 TABLET ORAL DAILY
COMMUNITY

## 2017-08-21 RX ORDER — IBUPROFEN 200 MG
16 TABLET ORAL
Status: DISCONTINUED | OUTPATIENT
Start: 2017-08-21 | End: 2017-08-23

## 2017-08-21 RX ORDER — INSULIN ASPART 100 [IU]/ML
0-5 INJECTION, SOLUTION INTRAVENOUS; SUBCUTANEOUS
Status: DISCONTINUED | OUTPATIENT
Start: 2017-08-21 | End: 2017-08-23

## 2017-08-21 RX ORDER — IBUPROFEN 200 MG
24 TABLET ORAL
Status: DISCONTINUED | OUTPATIENT
Start: 2017-08-21 | End: 2017-08-23

## 2017-08-21 RX ADMIN — ATORVASTATIN CALCIUM 40 MG: 10 TABLET, FILM COATED ORAL at 09:08

## 2017-08-21 RX ADMIN — ASPIRIN 81 MG CHEWABLE TABLET 81 MG: 81 TABLET CHEWABLE at 09:08

## 2017-08-21 RX ADMIN — HEPARIN SODIUM 5000 UNITS: 5000 INJECTION, SOLUTION INTRAVENOUS; SUBCUTANEOUS at 09:08

## 2017-08-21 RX ADMIN — METOPROLOL TARTRATE 50 MG: 50 TABLET, FILM COATED ORAL at 09:08

## 2017-08-21 RX ADMIN — Medication 3 ML: at 09:08

## 2017-08-21 RX ADMIN — Medication 3 ML: at 02:08

## 2017-08-21 RX ADMIN — HEPARIN SODIUM 5000 UNITS: 5000 INJECTION, SOLUTION INTRAVENOUS; SUBCUTANEOUS at 02:08

## 2017-08-21 RX ADMIN — INSULIN ASPART 2 UNITS: 100 INJECTION, SOLUTION INTRAVENOUS; SUBCUTANEOUS at 04:08

## 2017-08-21 RX ADMIN — INSULIN ASPART 4 UNITS: 100 INJECTION, SOLUTION INTRAVENOUS; SUBCUTANEOUS at 08:08

## 2017-08-21 RX ADMIN — CLOPIDOGREL 75 MG: 75 TABLET, FILM COATED ORAL at 09:08

## 2017-08-21 RX ADMIN — HEPARIN SODIUM 5000 UNITS: 5000 INJECTION, SOLUTION INTRAVENOUS; SUBCUTANEOUS at 05:08

## 2017-08-21 RX ADMIN — Medication 100 MG: at 09:08

## 2017-08-21 NOTE — PROGRESS NOTES
Wound care consult received.  Pt with history of CAD s/p CABG, PVD with right BKA, CKD III, HTN, poorly controlled DM type II, and alcohol abuse  Pt presents with wife at bedside.  DTI to left heel with bruising to anterior shin.  See assessment below.  Pt non verbal at the time of visit.  Wife states pt converses when he wants to.  Wife reports the pressure injuries are likely from his stay at another facility that required pt to be in restraints.    Recommend mepilex borders to both areas to prevent additional skin breakdown.  Discussed plan of care with pt and wife.  Dr. Stokes in agreement with recomendations.                 08/21/17 1116       Pressure Ulcer 08/20/17 1658 Left heel suspected deep tissue injury   Date First Assessed/Time First Assessed: 08/20/17 1658   Side: Left  Location: heel  Staging: suspected deep tissue injury   Wound Image    Staging suspected deep tissue injury   Pressure Ulcer Risk Factors activity;mobility;nutrition;shear/friction   Dressing Appearance intact   Drainage Amount none   Drainage Characteristics/Odor no odor   Appearance maroon;purple;gray;white   Periwound Area normal skin tone   Wound Edges intact   Wound Length (cm) 2.5   Wound Width (cm) 3   Depth (cm) 0   Dressing foam;silicon sheets   Dressing Change Due 08/26/17

## 2017-08-21 NOTE — PLAN OF CARE
Problem: Patient Care Overview  Goal: Plan of Care Review  Outcome: Ongoing (interventions implemented as appropriate)  POC reviewed with pt and spouse; both acknowledged understanding. Pt remains free from falls/injuries, VSS. Pt refusing to answer orientation questions. Blood glucose monitored q2h.Titrating insulin drip per algorithm. Pt tolerating TF and is now @ 50 ml/hrr (goal). HOB remained > 30 degress. Ríos removed @ 0545; pt dtv. Pt turned with pillow q2h. Wrist restraints for pulling at lines/tubes. Skin/ pulses remain intact. Passive ROM performed. Oral care provided. Wife remained at bedside. Bed alarm on for safety. WCTM.

## 2017-08-21 NOTE — HOSPITAL COURSE
Admitted on 8/18/2017 for acute metabolic encephalopathy found to be in DKA and started on DKA protocol with insulin infusion. Endocrine workup revealed a diagnosis of DM type I. DKA had resolved by second day of admission.   8/21: stepped down to hospital medicine, pt tolerating tube feed well and remains on insulin gtt,while on continue tube feed   8/22:  resume thyroid supplement, pt not particpating in PO intake even though he was cleared by speech for puree diet  Insulin drip transitioned to subcu on continuous feed pt to take detemir 8 BID with novolog 3 units Q4, pt will be transferred to nursing facility today

## 2017-08-21 NOTE — PLAN OF CARE
Problem: Patient Care Overview  Goal: Plan of Care Review  Outcome: Ongoing (interventions implemented as appropriate)  Vss. Infusioning transitional insulin at a rate of 1.3 w/ sliding scale in placed. Accu checks completed q4hrs. Restraints continued.

## 2017-08-21 NOTE — ASSESSMENT & PLAN NOTE
Type II, seen in clinic in 2016 with dx type 2; however, unable to obtain collateral and possibly Type I, per family on insulin gtt till CVA in 05/2017 and since on basal bolus per wife  -Would suspect chronic diagnosis given AKA, CAD, possible CKD  -C-peptide undetectable with BS in 400s; however, may be due to stunning and needs repeat after DKA resolved  -Agree with sending KATELIN Ab  -Anion gap closed, corrected anion gap borderline at 17 this AM but remains acidotic, would consider additional concurrent non-gap acidosis (EILEEN vs RTA4)  -Tolerating tube feeds at goal, keep on continuous feedings at goal today if tolerated

## 2017-08-21 NOTE — PT/OT/SLP EVAL
Physical Therapy  Evaluation    Brandon Simmons   MRN: [id number]   Admitting Diagnosis: Acute metabolic encephalopathy    PT Received On: 08/21/17  PT Start Time: 1058     PT Stop Time: 1119    PT Total Time (min): 21 min       Billable Minutes:  Evaluation 21     Personal factors/comorbidities: arthritis; CHF; HTN; DM; CAD. The listed co-morbidities and personal factors impact pt's current level and progress with functional mobility and independence.   Body systems elements affected: lower extremities, upper extremities, trunk, musculoskeletal system, neuromuscular system, cardiovascular, pulmonary system, integumentary system; orientation/level of consciousness  Impairments: see assessment below  Clinical Presentation: evolving  Functional Outcome Tools: AMPAC, MMT, ROM  Evaluation Complexity Level: Mod      Diagnosis: Acute metabolic encephalopathy      Past Medical History:   Diagnosis Date    Anticoagulant long-term use     Arthritis     Blood transfusion     CHF (congestive heart failure)     Coronary artery disease     Diabetes mellitus     Hypertension     Renal disorder       Past Surgical History:   Procedure Laterality Date    CARDIAC SURGERY      COLONOSCOPY N/A 10/16/2016    Procedure: COLONOSCOPY;  Surgeon: Jamar White MD;  Location: [facility];  Service: Endoscopy;  Laterality: N/A;    CORONARY ARTERY BYPASS GRAFT  2000    LEG AMPUTATION THROUGH LOWER TIBIA AND FIBULA      right leg - prosthesis in place    stents  left leg       Referring physician: VANESSA Paris  Date referred to PT: 08/20/17       General Precautions: Standard, fall, aspiration, NPO  Orthopedic Precautions: N/A   Braces: N/A (R LE prosthetic)       Do you have any cultural, spiritual, [employer] conflicts, given your current situation?: none stated    Patient History:  Lives with: wife  Lives in: 2SH; bed/bath located on 1st floor  PLOF/Level of assist: Mod (I) using SPC and R LE prosthetic. Wife assist  with ADLs.  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  Pt and spouse agreeable to PT/OT session.  Wife present at bedside throughout.       Chief Complaint: none stated  Patient goals: To get better      Objective:   Patient found with: oxygen, peripheral IV, telemetry, pressure relief boots, bed alarm (PEG)     Cognitive Exam:  Oriented to: Person and Place    Follows Commands/attention: Follows one-step commands  Communication: clear/fluent and requires increase time to respond; only one word responses  Safety awareness/insight to disability: intact    Physical Exam:  Postural examination/scapula alignment: Rounded shoulder, Head forward, Posterior pelvic tilt, Abnormal trunk flexion and Kyphosis    Skin integrity: Visible skin intact and pressure ulcer on L LE  Edema: None noted     Sensation:   Intact    Lower Extremity Range of Motion:  Right Lower Extremity: WFL  Left Lower Extremity: WFL    Lower Extremity Strength:  Right Lower Extremity: 2+/5 quadriceps; unable to test hip flexion due to balance  Left Lower Extremity: demonstrated at least 2/5; limited by cognition     Fine motor coordination:  Impaired    Gross motor coordination: Impaired    Functional Mobility:  Bed Mobility:  Supine to Sit:  Total Assistance (Max A x2ppl; pt able to initiate trunk elevation but required B HHA); required verbal/tactile cues to reach EOB  Sit to supine: Total Assistance   Scooting: Total Assistance towards EOB and via drawsheet towards HOB    Transfers:   Not performed due to poor sitting balance      Balance:  Static Sitting: Total Assistance noted posterior and R lateral lean; pt able to reach and attempt to  right posterior lean by holding on PT's hand in front, but no activation of core/adominals to reposition  Dynamic Sitting: Activity did not occur   Static Standing: Activity did not occur   Dynamic Standing: Activity did not occur       AM-PAC 6 CLICK MOBILITY  How much help from another person does this patient currently need?   1 = Unable, Total/Dependent Assistance  2 = A lot, Maximum/Moderate Assistance  3 = A little, Minimum/Contact Guard/Supervision  4 = None, Modified [place]/Independent    Turning over in bed (including adjusting bedclothes, sheets and blankets)?: 1  Sitting down on and standing up from a chair with arms (e.g., wheelchair, bedside commode, etc.): 1  Moving from lying on back to sitting on the side of the bed?: 2  Moving to and from a bed to a chair (including a wheelchair)?: 1  Need to walk in hospital room?: 1  Climbing 3-5 steps with a railing?: 1  Total Score: 7     AM-PAC Raw Score CMS G-Code Modifier Level of Impairment Assistance   6 % Total / Unable   7 - 9 CM 80 - 100% Maximal Assist   10 - 14 CL 60 - 80% Moderate Assist   15 - 19 CK 40 - 60% Moderate Assist   20 - 22 CJ 20 - 40% Minimal Assist   23 CI 1-20% SBA / CGA   24 CH 0% Independent/ Mod I     Patient left supine with all lines intact and call button in reach.    Assessment:   Brandon Simmons is a 67 y.o. male with a medical diagnosis of Acute metabolic encephalopathy and presents with decrease endurance, generalized weakness, impaired sitting balance, coordination, and cognition impairing pt's ability to participate in functional mobility and perform own daily tasks without assist.  Pt tolerated session well with no increase in pain/discomfort.   Pt would benefit from continued skilled physical therapy for the listed impairments to improve functional independence and overall safety with mobility prior to d/c. PT recommends d/c disposition to SNF for further PT, OT intervention.       Education:  Education provided  to pt regarding: PT role/POC; daily orientation. Verbalized understanding.     Whiteboard updated with correct mobility information. RN/PCT notified.  Transfer with therapy only at this time.       .    Rehab identified problem list/impairments: Rehab identified problem list/impairments: weakness, impaired endurance, gait instability, impaired balance, impaired cognition, impaired self care skills, impaired functional mobilty, visual deficits, decreased safety awareness, decreased lower extremity function, decreased upper extremity function, impaired cardiopulmonary response to activity, impaired skin    Rehab potential is good.    Activity tolerance: Good    Discharge recommendations: Discharge Facility/Level Of Care Needs: nursing facility, skilled     Barriers to discharge: Barriers to Discharge: None    Equipment recommendations: Equipment Needed After Discharge:  (TBD pending progress)     GOALS:    Physical Therapy Goals        Problem: Physical Therapy Goal    Goal Priority Disciplines Outcome Goal Variances Interventions   Physical Therapy Goal     PT/OT, PT      Description:  Goals to be met by: 17     Patient will increase functional independence with mobility by performin. Supine to sit with Maximum Assistance  2. Sit to supine with Maximum Assistance  3. Sitting at edge of bed x10 minutes with Maximum Assistance and ability to initiate repositioning to midline.   4. Lower extremity exercise program x10 reps with assistance as needed to improve ROM, flexibility, and strength with functional mobility.                      PLAN:    Patient to be seen 4 x/week to address the above listed problems via gait training, therapeutic activities, therapeutic exercises, neuromuscular re-education  Plan of Care expires: 17  Plan of Care reviewed with: patient, spouse    Functional Assessment Tool Used: ampac  Score: 7  Functional Limitation: Mobility: Walking and moving around  Mobility: Walking and  Moving Around Current Status (): KANU  Mobility: Walking and Moving Around Goal Status (): KANU Eng, PT  08/21/2017

## 2017-08-21 NOTE — PHARMACY MED REC
"Admission Medication Reconciliation - Pharmacy Consult Note    The home medication history was taken by Bina Fraga, Pharmacy Technician.  Based on information gathered and subsequent review by the clinical pharmacist, the items below may need attention.     You may go to "Admission" then "Reconcile Home Medications" tabs to review and/or act upon these items. Based on information gathered and subsequent review by the clinical pharmacist, the items below may need attention.    Potentially problematic discrepancies with current MAR  o Patient IS taking the following which was not ordered upon admit  o Levothyroxine 25mcg QAM    Please address this information as you see fit.  Feel free to contact us if you have any questions or require assistance.    Yao Cooper, PharmD  Ext [phone]    Patient's prior to admission medication regimen was as follows:  Medication Sig    amitriptyline (ELAVIL) 25 MG tablet Take 25 mg by mouth every evening.     amlodipine (NORVASC) 10 MG tablet Take 15 mg by mouth once daily.      aspirin 325 MG tablet Take 325 mg by mouth once daily.      atorvastatin (LIPITOR) 80 MG tablet Take 80 mg by mouth nightly.    clonidine (CATAPRES) 0.1 MG tablet Take 0.1 mg by mouth 2 (two) times daily.      clopidogrel (PLAVIX) 75 mg tablet Take 75 mg by mouth once daily.      CONTOUR NEXT STRIPS Strp test SIX TIMES DAILY    furosemide (LASIX) 40 MG tablet Take 40 mg by mouth every morning.     gabapentin (NEURONTIN) 600 MG tablet Take 600 mg by mouth 3 (three) times daily.      isosorbide mononitrate (IMDUR) 30 MG 24 hr tablet Take 30 mg by mouth once daily.      levothyroxine (SYNTHROID) 25 MCG tablet Take 25 mcg by mouth once daily.    lisinopril (PRINIVIL,ZESTRIL) 20 MG tablet Take 1 tablet (20 mg total) by mouth once daily.    losartan (COZAAR) 100 MG tablet Take 100 mg by mouth once daily.    metoprolol tartrate (LOPRESSOR) 100 MG tablet Take 100 mg by mouth 2 (two) times daily.    " NOVOLOG 100 unit/mL injection Inject into the skin. Per sliding scale    omeprazole (PRILOSEC) 20 MG capsule Take 20 mg by mouth once daily.     pyridoxine, vitamin B6, (VITAMIN B-6) 100 MG Tab Take 50 mg by mouth once daily.    vitamin D 1000 units Tab Take 185 mg by mouth once daily.    hydrochlorothiazide (HYDRODIURIL) 25 MG tablet Take 25 mg by mouth once daily.      lansoprazole (PREVACID) 30 MG capsule Take 30 mg by mouth once daily.      metoprolol succinate (TOPROL-XL) 100 MG 24 hr tablet Take 100 mg by mouth 2 (two) times daily.      pantoprazole (PROTONIX) 40 MG tablet Take 40 mg by mouth once daily.    ranolazine (RANEXA) 500 MG Tb12 Take 500 mg by mouth 2 (two) times daily.      simvastatin (ZOCOR) 20 MG tablet Take 20 mg by mouth every evening.      sucralfate (CARAFATE) 1 gram tablet TAKE 1 TABLET BY MOUTH FOUR TIMES DAILY BEFORE MEALS and AT BEDTIME         Please add appropriate    SmartPhrase below:

## 2017-08-21 NOTE — PROGRESS NOTES
[facility] Hospital Medicine  Progress Note    Patient Name: Brandon Simmons  MRN: [id number]  Patient Class: IP- Inpatient   Admission Date: 8/18/2017  Length of Stay: 3 days  Attending Physician: Zamzam Robles MD  Primary Care Provider: Neville Willis MD    Hospital Medicine Team: [facility] 5 Raina Casillas MD    Subjective:     Principal Problem:Acute metabolic encephalopathy    HPI:  No notes on file    Hospital Course:  Admitted on 8/18/2017 for acute metabolic encephalopathy found to be in DKA and started on DKA protocol with insulin infusion. Endocrine workup revealed a diagnosis of DM type I. DKA had resolved by second day of admission.   8/21: stepped down to hospital medicine, pt tolerating tube feed well and remains on insulin gtt, managed by endocrine        Interval History: MARY, pt stepped down, remains somnolent  but opens eye to voice and follows simple command    Review of Systems   Unable to perform ROS: Dementia     Objective:     Vital Signs (Most Recent):  Temp: 98.3 °F (36.8 °C) (08/21/17 1530)  Pulse: 72 (08/21/17 1530)  Resp: 18 (08/21/17 1530)  BP: (!) 127/58 (08/21/17 1530)  SpO2: (!) 93 % (08/21/17 1530) Vital Signs (24h Range):  Temp:  [98.2 °F (36.8 °C)-99.5 °F (37.5 °C)] 98.3 °F (36.8 °C)  Pulse:  [68-89] 72  Resp:  [18-20] 18  SpO2:  [93 %-98 %] 93 %  BP: (127-162)/(58-70) 127/58     Weight: 66.3 kg (146 lb 2.6 oz)  Body mass index is 22.89 kg/m².    Intake/Output Summary (Last 24 hours) at 08/21/17 1722  Last data filed at 08/21/17 1429   Gross per 24 hour   Intake           647.63 ml   Output              610 ml   Net            37.63 ml      Physical Exam   HENT:   Head: Normocephalic and atraumatic.   Eyes: Right eye exhibits no discharge. Left eye exhibits no discharge.   Neck: No JVD present. No tracheal deviation present.   Cardiovascular: Normal rate and intact distal pulses.  Exam reveals no gallop and no friction rub.    Pulmonary/Chest: Effort  normal and breath sounds normal. No respiratory distress. He has no wheezes.   Abdominal: Soft. Bowel sounds are normal. He exhibits no distension. There is no tenderness.   Musculoskeletal: He exhibits no edema.   Skin: Skin is warm and dry.       Significant Labs:   Recent Lab Results       08/21/17  1628 08/21/17  1548 08/21/17  1243 08/21/17  1209 08/21/17  1130      Anion Gap  7(L)   6(L)     Appearance, UA   Clear       Bacteria, UA   Occasional       Baso #          Basophil%          Bilirubin (UA)   Negative       BUN, Bld  23   22     Calcium  8.5(L)   8.5(L)     Chloride  112(H)   109     CO2  21(L)   21(L)     Color, UA   Yellow       Creatinine  1.9(H)   1.9(H)     Creatinine, Random Ur   119.0  Comment:  The random urine reference ranges provided were established   for 24 hour urine collections.  No reference ranges exist for  random urine specimens.  Correlate clinically.         Differential Method          eGFR if   41.3(A)   41.3(A)     eGFR if non   35.7  Comment:  Calculation used to obtain the estimated glomerular filtration  rate (eGFR) is the CKD-EPI equation. Since race is unknown   in our information system, the eGFR values for   -American and Non--American patients are given   for each creatinine result.  (A)   35.7  Comment:  Calculation used to obtain the estimated glomerular filtration  rate (eGFR) is the CKD-EPI equation. Since race is unknown   in our information system, the eGFR values for   -American and Non--American patients are given   for each creatinine result.  (A)     Eos #          Eosinophil%          Glucose  302(H)   271(H)     Glucose, UA   3+(A)       Gran #          Gran%          Hematocrit          Hemoglobin          Hyaline Casts, UA   6(A)       Ketones, UA   Negative       Leukocytes, UA   Negative       Lymph #          Lymph%          Magnesium          MCH          MCHC          MCV           Microscopic Comment   SEE COMMENT  Comment:  Other formed elements not mentioned in the report are not   present in the microscopic examination.          Mono #          Mono%          MPV          Nitrite, UA   Negative       Occult Blood UA   2+(A)       pH, UA   5.0       Phosphorus          Platelets          POCT Glucose 210(H)   291(H)      Potassium  4.0   3.8     Protein, UA   2+  Comment:  Recommend a 24 hour urine protein or a urine   protein/creatinine ratio if globulin induced proteinuria is  clinically suspected.  (A)       RBC          RBC, UA   16(H)       RDW          Sodium  140   136     Sodium Urine Random   98  Comment:  The random urine reference ranges provided were established   for 24 hour urine collections.  No reference ranges exist for  random urine specimens.  Correlate clinically.         Specific [place], UA   1.015       Specimen UA   Urine, Unspecified       Urine Urea Nitrogen, Random   687  Comment:  The random urine reference ranges provided were established   for 24 hour urine collections.  No reference ranges exist for  random urine specimens.  Correlate clinically.         Urobilinogen, UA   Negative       WBC, UA   1       WBC          Yeast, UA   None                   08/21/17  1007 08/21/17  0814 08/21/17  0806 08/21/17  0555 08/21/17  0402      Anion Gap   7(L)  8     Appearance, UA          Bacteria, UA          Baso #     0.03     Basophil%     0.2     Bilirubin (UA)          BUN, Bld   20  20     Calcium   8.6(L)  9.3     Chloride   113(H)  113(H)     CO2   20(L)  22(L)     Color, UA          Creatinine   1.8(H)  1.9(H)     Creatinine, Random Ur          Differential Method     Automated     eGFR if    44.0(A)  41.3(A)     eGFR if non    38.1  Comment:  Calculation used to obtain the estimated glomerular filtration  rate (eGFR) is the CKD-EPI equation. Since race is unknown   in our information system, the eGFR values for    -American and Non--American patients are given   for each creatinine result.  (A)  35.7  Comment:  Calculation used to obtain the estimated glomerular filtration  rate (eGFR) is the CKD-EPI equation. Since race is unknown   in our information system, the eGFR values for   -American and Non--American patients are given   for each creatinine result.  (A)     Eos #     0.3     Eosinophil%     1.8     Glucose   266(H)  224(H)     Glucose, UA          Gran #     13.1(H)     Gran%     83.3(H)     Hematocrit     35.1(L)     Hemoglobin     11.7(L)     Hyaline Casts, UA          Ketones, UA          Leukocytes, UA          Lymph #     1.4     Lymph%     8.9(L)     Magnesium     1.7     MCH     28.7     MCHC     33.3     MCV     86     Microscopic Comment          Mono #     0.8     Mono%     5.1     MPV     10.4     Nitrite, UA          Occult Blood UA          pH, UA          Phosphorus     2.9     Platelets     259     POCT Glucose 213(H) 327(H)  246(H)      Potassium   3.8  4.6     Protein, UA          RBC     4.08(L)     RBC, UA          RDW     14.4     Sodium   140  143     Sodium Urine Random          Specific Gravity, UA          Specimen UA          Urine Urea Nitrogen, Random          Urobilinogen, UA          WBC, UA          WBC     15.70(H)     Yeast, UA                      08/21/17  0354 08/21/17  0211 08/21/17  0021 08/21/17  0000 08/20/17  2219      Anion Gap   7(L)       Appearance, UA          Bacteria, UA          Baso #          Basophil%          Bilirubin (UA)          BUN, Bld   17       Calcium   9.0       Chloride   112(H)       CO2   22(L)       Color, UA          Creatinine   1.8(H)       Creatinine, Random Ur          Differential Method          eGFR if    44.0(A)       eGFR if non    38.1  Comment:  Calculation used to obtain the estimated glomerular filtration  rate (eGFR) is the CKD-EPI equation. Since race is unknown   in our  information system, the eGFR values for   -American and Non--American patients are given   for each creatinine result.  (A)       Eos #          Eosinophil%          Glucose   240(H)       Glucose, UA          Gran #          Gran%          Hematocrit          Hemoglobin          Hyaline Casts, UA          Ketones, UA          Leukocytes, UA          Lymph #          Lymph%          Magnesium          MCH          MCHC          MCV          Microscopic Comment          Mono #          Mono%          MPV          Nitrite, UA          Occult Blood UA          pH, UA          Phosphorus          Platelets          POCT Glucose 252(H) 205(H)  267(H) 274(H)     Potassium   3.9       Protein, UA          RBC          RBC, UA          RDW          Sodium   141       Sodium Urine Random          Specific Gravity, UA          Specimen UA          Urine Urea Nitrogen, Random          Urobilinogen, UA          WBC, UA          WBC          Yeast, UA                      08/20/17 2002 08/20/17 2000 08/20/17  1813      Anion Gap  10      Appearance, UA        Bacteria, UA        Baso #        Basophil%        Bilirubin (UA)        BUN, Bld  16      Calcium  9.3      Chloride  111(H)      CO2  20(L)      Color, UA        Creatinine  1.8(H)      Creatinine, Random Ur        Differential Method        eGFR if   44.0(A)      eGFR if non   38.1  Comment:  Calculation used to obtain the estimated glomerular filtration  rate (eGFR) is the CKD-EPI equation. Since race is unknown   in our information system, the eGFR values for   -American and Non--American patients are given   for each creatinine result.  (A)      Eos #        Eosinophil%        Glucose  262(H)      Glucose, UA        Gran #        Gran%        Hematocrit        Hemoglobin        Hyaline Casts, UA        Ketones, UA        Leukocytes, UA        Lymph #        Lymph%        Magnesium        MCH        MCHC      "   MCV        Microscopic Comment        Mono #        Mono%        MPV        Nitrite, UA        Occult Blood UA        pH, UA        Phosphorus        Platelets        POCT Glucose 284(H)  294(H)     Potassium  3.9      Protein, UA        RBC        RBC, UA        RDW        Sodium  141      Sodium Urine Random        Specific Gravity, UA        Specimen UA        Urine Urea Nitrogen, Random        Urobilinogen, UA        WBC, UA        WBC        Yeast, UA              Significant Imaging: I have reviewed all pertinent imaging results/findings within the past 24 hours.    Assessment/Plan:      * Acute metabolic encephalopathy    -Encephalopathy seems far out of proportion from DKA alone particularly if reported as "mild" on initial OSH presentation; however, do not have labwork to confirm  -TSH nl  -Cortisol ok  -neurology is following, awaiting EEG results          Diabetic ketoacidosis    Type II, seen in clinic in 2016 with dx type 2; however, unable to obtain collateral and possibly Type I, per family on insulin gtt till CVA in 05/2017 and since on basal bolus per wife  -Would suspect chronic diagnosis given AKA, CAD, possible CKD  -C-peptide undetectable with BS in 400s; however, may be due to stunning and needs repeat after DKA resolved  -Agree with sending KATELIN Ab  -Anion gap closed, corrected anion gap borderline at 17 this AM but remains acidotic, would consider additional concurrent non-gap acidosis (EILEEN vs RTA4)  -Tolerating tube feeds at goal, keep on continuous feedings at goal today if tolerated          CKD (chronic kidney disease) stage 3, GFR 30-59 ml/min    EF per report 45% with diastolic dysfunction  -On lipitor  -On beta blocker  -Not on ACE/ARB, possibly 2/2 EILEEN vs CKD, will resume when at baseline        EILEEN (acute kidney injury)    1.6 on admission and 1.9 today  -FU urin lytes          Anemia    -relatively stable  -continue to monitor            VTE Risk Mitigation         Ordered     " heparin (porcine) injection 5,000 Units  Every 8 hours     Route:  Subcutaneous        08/18/17 2315     High Risk of VTE  Once      08/18/17 2308     Place sequential compression device  Until discontinued      08/18/17 2308              Raina Casillas MD  Department of Hospital Medicine   [facility]

## 2017-08-21 NOTE — PLAN OF CARE
08/21/17 1005   Discharge Assessment   Assessment Type Discharge Planning Assessment   Confirmed/corrected address and phone number on facesheet? Yes   Assessment information obtained from? Caregiver  (spouse)   Expected Length of Stay (days) 3   Communicated expected length of stay with patient/caregiver yes   Prior to hospitilization cognitive status: Alert/Oriented   Prior to hospitalization functional status: Assistive Equipment;Needs Assistance   Current cognitive status: Alert/Oriented   Current Functional Status: Assistive Equipment;Needs Assistance   Arrived From admitted as an inpatient;home or self-care;home health  ([facility])   Lives With spouse   Able to Return to Prior Arrangements unable to determine at this time (comments)   Is patient able to care for self after discharge? Unable to determine at this time (comments)   How many people do you have in your home that can help with your care after discharge? 1   Who are your caregiver(s) and their phone number(s)? (Elvia Jinsin, spouse, [phone])   Patient's perception of discharge disposition home or selfcare;home health   Readmission Within The Last 30 Days no previous admission in last 30 days   Patient currently being followed by outpatient case management? No   Patient currently receives home health services? Yes   Patient previously received home health services and would like to resume services if necessary? Yes   If yes, name of home health provider: ([facility])   Does the patient currently use HME? Yes   Patient currently receives private duty nursing? N/A   Patient currently receives any other outside agency services? No   Equipment Currently Used at Home prosthesis;cane, straight;walker, standard;wheelchair;bedside commode   Do you have any problems affording any of your prescribed medications? No   Is the patient taking medications as prescribed? yes   Do you have any financial concerns preventing you from receiving the  healthcare you need? No   Does the patient have transportation to healthcare appointments? Yes   Transportation Available family or friend will provide   On Dialysis? No   Does the patient receive services at the [facility]? No   Are there any open cases? No   Discharge Plan A Home;Home with family;Home Health   Discharge Plan B Skilled Nursing Facility   Patient/Family In Agreement With Plan yes     PCP: [facility] - EDIE Fernandez  [address]  [address] [address]  [place] [address]  Phone: [phone] Fax: [phone]      Payor: MEDICARE / Plan: MEDICARE A ONLY / Product Type: Government /

## 2017-08-21 NOTE — ASSESSMENT & PLAN NOTE
EF per report 45% with diastolic dysfunction  -On lipitor  -On beta blocker  -Not on ACE/ARB, possibly 2/2 EILEEN vs CKD, will resume when at baseline

## 2017-08-21 NOTE — PLAN OF CARE
Problem: Occupational Therapy Goal  Goal: Occupational Therapy Goal  Goals to be met by: 8/28/17    Patient will increase functional independence with ADLs by performing:    UE Dressing with Moderate Assistance.  LE Dressing with Moderate Assistance.  Grooming while EOB with Minimal Assistance.  Rolling to Bilateral with Minimal Assistance.   Supine to sit with Moderate Assistance.  Stand pivot transfers with Maximum Assistance.  Toilet transfer to bedside commode with Maximum Assistance.    Outcome: Ongoing (interventions implemented as appropriate)  Evaluation completed and POC established.    DEBBY Aguilar

## 2017-08-21 NOTE — PT/OT/SLP EVAL
Occupational Therapy  Evaluation    Brandon Simmons   MRN: [id number]   Admitting Diagnosis: Acute metabolic encephalopathy    OT Date of Treatment: 08/21/17   OT Start Time: 1058  OT Stop Time: 1119  OT Total Time (min): 21 min    Billable Minutes:  Evaluation 11  Neuromuscular Re-education 10    Diagnosis: Acute metabolic encephalopathy   (R) BKA (2000)    Past Medical History:   Diagnosis Date    Anticoagulant long-term use     Arthritis     Blood transfusion     CHF (congestive heart failure)     Coronary artery disease     Diabetes mellitus     Hypertension     Renal disorder       Past Surgical History:   Procedure Laterality Date    CARDIAC SURGERY      COLONOSCOPY N/A 10/16/2016    Procedure: COLONOSCOPY;  Surgeon: Jamar White MD;  Location: [facility];  Service: Endoscopy;  Laterality: N/A;    CORONARY ARTERY BYPASS GRAFT  2000    LEG AMPUTATION THROUGH LOWER TIBIA AND FIBULA      right leg - prosthesis in place    stents  left leg       Referring physician: Nacho  Date referred to OT: 8/20/17    General Precautions: Standard, fall  Orthopedic Precautions:    Braces:  (RLE prosthetic)          Patient History:  Living Environment  Lives With: spouse  Home Accessibility: stairs within home, stairs to enter home  Home Layout: Able to live on 1st floor  Number of Stairs to Enter Home: 1  Transportation Available: family or friend will provide  Living Environment Comment: Pt lives in a 2SH with bed/bath on 1st floor. Pt was totally (I) with ADLs PTA using a SPC and often doing odd jobs around house. R BKA was done in 2000.  Equipment Currently Used at Home: prosthesis, bedside commode, wheelchair, cane, straight, walker, rolling    Prior level of function:   Bed Mobility/Transfers: needs device  Grooming: independent  Bathing: independent  Upper Body Dressing: independent  Lower Body Dressing: independent  Toileting: independent  Home Management Skills: independent        Dominant hand:  right    Subjective:  Communicated with RN prior to session.    Pain/Comfort  Pain Rating 1: 0/10    Objective:  Patient found with: oxygen, peripheral IV, telemetry    Cognitive Exam:  Oriented to: Person  Follows Commands/attention: Inattentive  Communication: clear/fluent  Memory:  Impaired STM, Impaired LTM and Poor immediate recall  Safety awareness/insight to disability: impaired  Coping skills/emotional control: Appropriate to situation    Physical Exam:  Postural examination/scapula alignment: Rounded shoulder  Skin integrity: Visible skin intact  Edema: None noted     Sensation:   Intact    Upper Extremity Range of Motion:  Right Upper Extremity: WFL  Left Upper Extremity: WFL    Upper Extremity Strength:  Right Upper Extremity: WFL  Left Upper Extremity: WFL   Strength: wfl    Fine motor coordination:   Intact    Gross motor coordination: WFL    Functional Mobility:  Bed Mobility:  Rolling/Turning to Left: Maximum assistance  Supine to Sit: Maximum Assistance, With assist of 2  Sit to Supine: Maximum Assistance, With assist of 2, With leg lift    Transfers:  Sit <> Stand Assistance: Activity did not occur    Functional Ambulation: Did not occur this session.    UE Dressing Level of Assistance: Total assistance (Due to poor command following.)          Balance:   Static Sit: FAIR+: Able to take MINIMAL challenges from all directions  Dynamic Sit: FAIR: Cannot move trunk without losing balance    Therapeutic Activities and Exercises:  UE ROM/MMT  Bed mobility training / assessment  Functional mobility assessment  - did not assess this session but pt has prosthetic in room.  Sit  balance assessment  - sat EOB ~ 10 minutes with CGA for static balance and Min A for dynamic.  Discussed OT POC / Post-acute plan    AM-PAC 6 CLICK ADL  How much help from another person does this patient currently need?  1 = Unable, Total/Dependent Assistance  2 = A lot, Maximum/Moderate Assistance  3 = A little, Minimum/Contact  "Guard/Supervision  4 = None, Modified [place]/Independent    Putting on and taking off regular lower body clothing? : 1  Bathing (including washing, rinsing, drying)?: 1  Toileting, which includes using toilet, bedpan, or urinal? : 1  Putting on and taking off regular upper body clothing?: 1  Taking care of personal grooming such as brushing teeth?: 2  Eating meals?: 3  Total Score: 9    AM-PAC Raw Score CMS "G-Code Modifier Level of Impairment Assistance   6 % Total / Unable   7 - 9 CM 80 - 100% Maximal Assist   10-14 CL 60 - 80% Moderate Assist   15 - 19 CK 40 - 60% Moderate Assist   20 - 22 CJ 20 - 40% Minimal Assist   23 CI 1-20% SBA / CGA   24 CH 0% Independent/ Mod I       Patient left supine with all lines intact, call button in reach and wife present    Assessment:  Brandon GABRIEL Cousin is a 67 y.o. male with a medical diagnosis of Acute metabolic encephalopathy and presents with decreased cognition/orientation as well as decreased (I) in multiple ADL areas, functional mobility & t/fs as well as decreased overall strength, ROM, endurance and balance. Pt would benefit from skilled OT services as well as SNF placement to address these deficits and to facilitate improving (I) with daily tasks.    Pt evaluation falls under low complexity for evaluation coding due to performance deficits noted in 1-3 areas as stated above and 0 co-morbities affecting current functional status. Data obtained from problem focused assessments. No modifications or assistance was required for completion of evaluation. Only brief occupational profile and history review completed.     Rehab identified problem list/impairments: Rehab identified problem list/impairments: weakness, impaired functional mobilty, impaired balance, impaired cognition, decreased upper extremity function, decreased safety awareness, decreased lower extremity function, decreased coordination, gait instability, impaired self care skills, impaired " endurance    Rehab potential is fair.    Activity tolerance: Fair    Discharge recommendations: Discharge Facility/Level Of Care Needs: nursing facility, skilled     Barriers to discharge: Barriers to Discharge: None    Equipment recommendations:       GOALS:    Occupational Therapy Goals        Problem: Occupational Therapy Goal    Goal Priority Disciplines Outcome Interventions   Occupational Therapy Goal     OT, PT/OT Ongoing (interventions implemented as appropriate)    Description:  Goals to be met by: 8/28/17    Patient will increase functional independence with ADLs by performing:    UE Dressing with Moderate Assistance.  LE Dressing with Moderate Assistance.  Grooming while EOB with Minimal Assistance.  Rolling to Bilateral with Minimal Assistance.   Supine to sit with Moderate Assistance.  Stand pivot transfers with Maximum Assistance.  Toilet transfer to bedside commode with Maximum Assistance.                      PLAN:  Patient to be seen 4 x/week to address the above listed problems via self-care/home management, therapeutic exercises, therapeutic activities, neuromuscular re-education  Plan of Care expires: 09/20/17  Plan of Care reviewed with: patient, spouse         DEBBY Rodriguez  08/21/2017

## 2017-08-21 NOTE — PHYSICIAN QUERY
PT Name: Brandon Simmons  MR #: [id number]    Physician Query Form - Nutrition Clarification     CDS/: Carmen Cottrell RN           Contact information: [email or web address]    This form is a permanent document in the medical record.     Query Date: August 21, 2017    By submitting this query, we are merely seeking further clarification of documentation.. Please utilize your independent clinical judgment when addressing the question(s) below.    The Medical record contains the following:   Indicators  Supporting Clinical Findings Location in Medical Record   X % of Estimated Energy Intake over a time frame from p.o., TF, or TPN NPO Status RD Consult Note    Weight Status over a time frame      Subcutaneous Fat and/or Muscle Loss      Fluid Accumulation or Edema      Reduced  Strength     X Wt / BMI / Usual Body Weight Amputee BMI (kg/m2): 23.11 kg/m2  RD Consult Note    Delayed Wound Healing / Failure to Thrive     X Acute or Chronic Illness Acute Metabolic Encephalopathy, R AKA, alcohol abuse, NPO, Peg Placement, DM 1, DKA, Chronic systolic Heart failure H & P    Medication     X Treatment Patient began refusing meals at OSH and had PEG tube placement 08/17/17, forced caloric intake     Progress Note: 8/20   X Other Inadequate energy intake r/t inability to consume sufficient energy AEB NPO with no alternate means of nutrition RD Consult assessment     AND / ASPEN Clinical Characteristics (October 2011)  A minimum of two characteristics is recommended for diagnosing either moderate or severe malnutrition   Mild Malnutrition Moderate Malnutrition Severe Malnutrition   Energy Intake from p.o., TF or TPN. < 75% intake of estimated energy needs for less than 7 days < 75% intake of estimated energy needs for greater than 7 days < 50% intake of estimated energy needs for > 5 days   Weight Loss 1-2% in 1 month  5% in 3 months  7.5% in 6 months  10% in 1 year 1-2 % in 1 week  5% in 1 month  7.5% in 3  months  10% in 6 months  20% in 1 year > 2% in 1 week  > 5% in 1 month  > 7.5% in 3 months  > 10% in 6 months  > 20% in 1 year   Physical Findings     None *Mild subcutaneous fat and/or muscle loss  *Mild fluid accumulation  *Stage II decubitus  *Surgical wound or non-healing wound *Mod/severe subcutaneous fat and/or muscle loss  *Mod/severe fluid accumulation  *Stage III or IV decubitus  *Non-healing surgical wound     Provider, please specify diagnosis or diagnoses associated with above clinical findings.    [ ] Mild Protein-Calorie Malnutrition  [X] Moderate Protein-Calorie Malnutrition  [ ] Other Nutritional Diagnosis (please specify): ____________________________________  [ ] Other: ________________________________  [ ] Clinically Undetermined    Please document in your progress notes daily for the duration of treatment until resolved and include in your discharge summary.

## 2017-08-21 NOTE — PLAN OF CARE
Problem: Physical Therapy Goal  Goal: Physical Therapy Goal  Goals to be met by: 17     Patient will increase functional independence with mobility by performin. Supine to sit with Maximum Assistance  2. Sit to supine with Maximum Assistance  3. Sitting at edge of bed x10 minutes with Maximum Assistance and ability to initiate repositioning to midline.   4. Lower extremity exercise program x10 reps with assistance as needed to improve ROM, flexibility, and strength with functional mobility.          PT evaluation completed. POC and goals established.    Bina Eng, PT, DPT  2017

## 2017-08-21 NOTE — PROGRESS NOTES
"[facility]  Endocrinology  Progress Note    Admit Date: 2017     Mr. Cousin is a 66 y/o PMH DM (unclear if Type I vs II), CHF (unk. EF), HTN, EtOH abuse, EILEEN vs CKD who presents as a transfer from [facility] after presenting with chief complaint of AMS. Currently encephalopathic and no family available for collateral history. Per transfer report presented 2017 with acute personality changes and confusion noticed by wife. Per their report was in "mild" ketoacidosis. Apparently blood glucose values had been extremely labile with patient fluctuating between hypoglycemia and hyperglycemia with minimal insulin so transferred for additional endocrinology evaluation. Unknown length of duration of DM diagnosis or home medications.     Per report mental status has been disorented x3, with agitation and combativeness requiring restraints. CT head at OSH notable for chronic ischemic changes but no acute abnormality. Evaluated by Neurology there who suspected multifactorial metabolic encephalopathy. Also treated for possible EtOH withdrawal.     Reason for Consult: Management of T2DM, Hyperglycemia     Surgical Procedure and Date: unknown    Diabetes diagnosis year: unknown    Home Diabetes Medications:  unknown    How often checking glucose at home? Unknown   BG readings on regimen: Unknown  Hypoglycemia on the regimen?  unknown  Missed doses on regimen? unknown    Diabetes Complications include:     Diabetic chronic kidney disease      and Amputation status    Complicating diabetes co morbidities:   CHF and CKD        Interval HPI:   Overnight events: none, confusion improved by a small amount per wife. TFs running at 50ml/hr at goal. Denies any nausea, abd pain, discomfort  Eatin%  Nausea: No  Hypoglycemia and intervention: No  Fever: No  TPN and/or TF: Yes  If yes, type of TF/TPN and rate: Diabetasource full strength, 50cc/hr continuous    /60 (BP Location: Left arm, " "Patient Position: Lying)   Pulse 89   Temp 99.5 °F (37.5 °C) (Axillary)   Resp 18   Ht 5' 7" (1.702 m)   Wt 66.3 kg (146 lb 2.6 oz)   SpO2 (!) 94%   BMI 22.89 kg/m²       Labs Reviewed and Include      Recent Labs  Lab 08/21/17  0806   *   CALCIUM 8.6*      K 3.8   CO2 20*   *   BUN 20   CREATININE 1.8*     Lab Results   Component Value Date    WBC 15.70 (H) 08/21/2017    HGB 11.7 (L) 08/21/2017    HCT 35.1 (L) 08/21/2017    MCV 86 08/21/2017     08/21/2017       Recent Labs  Lab 08/19/17  0930   TSH 2.131     Lab Results   Component Value Date    HGBA1C 10.0 (H) 08/18/2017       Nutritional status:   Body mass index is 22.89 kg/m².  Lab Results   Component Value Date    ALBUMIN 1.9 (L) 08/19/2017    ALBUMIN 2.0 (L) 08/19/2017    ALBUMIN 2.0 (L) 08/18/2017     No results found for: PREALBUMIN    Estimated Creatinine Clearance: 37.2 mL/min (based on Cr of 1.8).    Accu-Checks  Recent Labs      08/20/17   1608  08/20/17   1813  08/20/17   2002  08/20/17   2219  08/21/17   0000  08/21/17   0211  08/21/17   0354  08/21/17   0555  08/21/17   0814  08/21/17   1007   POCTGLUCOSE  250*  294*  284*  274*  267*  205*  252*  246*  327*  213*       Current Medications and/or Treatments Impacting Glycemic Control  Immunotherapy:  Immunosuppressants     None        Steroids:   Hormones     None        Pressors:    Autonomic Drugs     None        Hyperglycemia/Diabetes Medications: Antihyperglycemics     Start     Stop Route Frequency Ordered    08/20/17 1115  insulin regular (Humulin R) 100 Units in sodium chloride 0.9% 100 mL infusion      -- IV Continuous 08/20/17 1010          ASSESSMENT and PLAN    Uncontrolled type 1 diabetes mellitus with hyperglycemia, with long-term current use of insulin     -Type II, seen in clinic in 2016 with dx type 2; however, unable to obtain collateral and possibly Type I, per family on insulin gtt till CVA in 05/2017 and since on basal bolus per wife  -Would " "suspect chronic diagnosis given AKA, CAD, possible CKD  -C-peptide undetectable with BS in 400s; however, may be due to stunning and needs repeat after DKA resolved  -Agree with sending KATELIN Ab  -Anion gap closed, corrected anion gap borderline at 17 this AM but remains acidotic, would consider additional concurrent non-gap acidosis (EILEEN vs RTA4)  -Tolerating tube feeds at goal, keep on continuous feedings at goal today if tolerated  -Will start transition gtt at 1u/hr with 0-5u aspart correction scale q4h       * Acute metabolic encephalopathy     -Workup per primary team  -Encephalopathy seems far out of proportion from DKA alone particularly if reported as "mild" on initial OSH presentation; however, do not have labwork to confirm  -TSH nl  -Cortisol ok       Chronic combined systolic and diastolic heart failure     -EF per report 45% with diastolic dysfunction  -On lipitor  -On beta blocker  -Not on ACE/ARB, possibly 2/2 EILEEN vs CKD     avoid hypoglycemia        CAD (coronary artery disease)     -Per primary  -On lipitor  Avoid hypoglycemia           Essential hypertension     -Managed per primary  -Long term BP goal at least <140/<80            Ron Cabral IV, MD  Endocrinology  [facility],  have personally taken the history and examined the patient and agree with the resident's note as stated above.    Increase sd to 1.3 u/hr     On eternal feeds- insulin rate to follow feed rate   since the patient has T1DM the insulin drip should not be stopped without letting endocrine know so we can start D10 plus insulin drip if needed.      "

## 2017-08-21 NOTE — ASSESSMENT & PLAN NOTE
"-Encephalopathy seems far out of proportion from DKA alone particularly if reported as "mild" on initial OSH presentation; however, do not have labwork to confirm  -TSH nl  -Cortisol ok  -neurology is following, awaiting EEG results    "

## 2017-08-21 NOTE — SUBJECTIVE & OBJECTIVE
Interval History: jose m HERNANDEZ stepped down, remains somnolent  but opens eye to voice and follows simple command    Review of Systems   Unable to perform ROS: Dementia     Objective:     Vital Signs (Most Recent):  Temp: 98.3 °F (36.8 °C) (08/21/17 1530)  Pulse: 72 (08/21/17 1530)  Resp: 18 (08/21/17 1530)  BP: (!) 127/58 (08/21/17 1530)  SpO2: (!) 93 % (08/21/17 1530) Vital Signs (24h Range):  Temp:  [98.2 °F (36.8 °C)-99.5 °F (37.5 °C)] 98.3 °F (36.8 °C)  Pulse:  [68-89] 72  Resp:  [18-20] 18  SpO2:  [93 %-98 %] 93 %  BP: (127-162)/(58-70) 127/58     Weight: 66.3 kg (146 lb 2.6 oz)  Body mass index is 22.89 kg/m².    Intake/Output Summary (Last 24 hours) at 08/21/17 1722  Last data filed at 08/21/17 1429   Gross per 24 hour   Intake           647.63 ml   Output              610 ml   Net            37.63 ml      Physical Exam   HENT:   Head: Normocephalic and atraumatic.   Eyes: Right eye exhibits no discharge. Left eye exhibits no discharge.   Neck: No JVD present. No tracheal deviation present.   Cardiovascular: Normal rate and intact distal pulses.  Exam reveals no gallop and no friction rub.    Pulmonary/Chest: Effort normal and breath sounds normal. No respiratory distress. He has no wheezes.   Abdominal: Soft. Bowel sounds are normal. He exhibits no distension. There is no tenderness.   Musculoskeletal: He exhibits no edema.   Skin: Skin is warm and dry.       Significant Labs:   Recent Lab Results       08/21/17  1628 08/21/17  1548 08/21/17  1243 08/21/17  1209 08/21/17  1130      Anion Gap  7(L)   6(L)     Appearance, UA   Clear       Bacteria, UA   Occasional       Baso #          Basophil%          Bilirubin (UA)   Negative       BUN, Bld  23   22     Calcium  8.5(L)   8.5(L)     Chloride  112(H)   109     CO2  21(L)   21(L)     Color, UA   Yellow       Creatinine  1.9(H)   1.9(H)     Creatinine, Random Ur   119.0  Comment:  The random urine reference ranges provided were established   for 24 hour urine  collections.  No reference ranges exist for  random urine specimens.  Correlate clinically.         Differential Method          eGFR if   41.3(A)   41.3(A)     eGFR if non   35.7  Comment:  Calculation used to obtain the estimated glomerular filtration  rate (eGFR) is the CKD-EPI equation. Since race is unknown   in our information system, the eGFR values for   -American and Non--American patients are given   for each creatinine result.  (A)   35.7  Comment:  Calculation used to obtain the estimated glomerular filtration  rate (eGFR) is the CKD-EPI equation. Since race is unknown   in our information system, the eGFR values for   -American and Non--American patients are given   for each creatinine result.  (A)     Eos #          Eosinophil%          Glucose  302(H)   271(H)     Glucose, UA   3+(A)       Gran #          Gran%          Hematocrit          Hemoglobin          Hyaline Casts, UA   6(A)       Ketones, UA   Negative       Leukocytes, UA   Negative       Lymph #          Lymph%          Magnesium          MCH          MCHC          MCV          Microscopic Comment   SEE COMMENT  Comment:  Other formed elements not mentioned in the report are not   present in the microscopic examination.          Mono #          Mono%          MPV          Nitrite, UA   Negative       Occult Blood UA   2+(A)       pH, UA   5.0       Phosphorus          Platelets          POCT Glucose 210(H)   291(H)      Potassium  4.0   3.8     Protein, UA   2+  Comment:  Recommend a 24 hour urine protein or a urine   protein/creatinine ratio if globulin induced proteinuria is  clinically suspected.  (A)       RBC          RBC, UA   16(H)       RDW          Sodium  140   136     Sodium Urine Random   98  Comment:  The random urine reference ranges provided were established   for 24 hour urine collections.  No reference ranges exist for  random urine specimens.  Correlate  clinically.         Specific [place], UA   1.015       Specimen UA   Urine, Unspecified       Urine Urea Nitrogen, Random   687  Comment:  The random urine reference ranges provided were established   for 24 hour urine collections.  No reference ranges exist for  random urine specimens.  Correlate clinically.         Urobilinogen, UA   Negative       WBC, UA   1       WBC          Yeast, UA   None                   08/21/17  1007 08/21/17  0814 08/21/17  0806 08/21/17  0555 08/21/17  0402      Anion Gap   7(L)  8     Appearance, UA          Bacteria, UA          Baso #     0.03     Basophil%     0.2     Bilirubin (UA)          BUN, Bld   20  20     Calcium   8.6(L)  9.3     Chloride   113(H)  113(H)     CO2   20(L)  22(L)     Color, UA          Creatinine   1.8(H)  1.9(H)     Creatinine, Random Ur          Differential Method     Automated     eGFR if    44.0(A)  41.3(A)     eGFR if non    38.1  Comment:  Calculation used to obtain the estimated glomerular filtration  rate (eGFR) is the CKD-EPI equation. Since race is unknown   in our information system, the eGFR values for   -American and Non--American patients are given   for each creatinine result.  (A)  35.7  Comment:  Calculation used to obtain the estimated glomerular filtration  rate (eGFR) is the CKD-EPI equation. Since race is unknown   in our information system, the eGFR values for   -American and Non--American patients are given   for each creatinine result.  (A)     Eos #     0.3     Eosinophil%     1.8     Glucose   266(H)  224(H)     Glucose, UA          Gran #     13.1(H)     Gran%     83.3(H)     Hematocrit     35.1(L)     Hemoglobin     11.7(L)     Hyaline Casts, UA          Ketones, UA          Leukocytes, UA          Lymph #     1.4     Lymph%     8.9(L)     Magnesium     1.7     MCH     28.7     MCHC     33.3     MCV     86     Microscopic Comment          Mono #     0.8     Mono%      5.1     MPV     10.4     Nitrite, UA          Occult Blood UA          pH, UA          Phosphorus     2.9     Platelets     259     POCT Glucose 213(H) 327(H)  246(H)      Potassium   3.8  4.6     Protein, UA          RBC     4.08(L)     RBC, UA          RDW     14.4     Sodium   140  143     Sodium Urine Random          Specific Gravity, UA          Specimen UA          Urine Urea Nitrogen, Random          Urobilinogen, UA          WBC, UA          WBC     15.70(H)     Yeast, UA                      08/21/17  0354 08/21/17  0211 08/21/17  0021 08/21/17  0000 08/20/17  2219      Anion Gap   7(L)       Appearance, UA          Bacteria, UA          Baso #          Basophil%          Bilirubin (UA)          BUN, Bld   17       Calcium   9.0       Chloride   112(H)       CO2   22(L)       Color, UA          Creatinine   1.8(H)       Creatinine, Random Ur          Differential Method          eGFR if    44.0(A)       eGFR if non    38.1  Comment:  Calculation used to obtain the estimated glomerular filtration  rate (eGFR) is the CKD-EPI equation. Since race is unknown   in our information system, the eGFR values for   -American and Non--American patients are given   for each creatinine result.  (A)       Eos #          Eosinophil%          Glucose   240(H)       Glucose, UA          Gran #          Gran%          Hematocrit          Hemoglobin          Hyaline Casts, UA          Ketones, UA          Leukocytes, UA          Lymph #          Lymph%          Magnesium          MCH          MCHC          MCV          Microscopic Comment          Mono #          Mono%          MPV          Nitrite, UA          Occult Blood UA          pH, UA          Phosphorus          Platelets          POCT Glucose 252(H) 205(H)  267(H) 274(H)     Potassium   3.9       Protein, UA          RBC          RBC, UA          RDW          Sodium   141       Sodium Urine Random          Specific  Gravity, UA          Specimen UA          Urine Urea Nitrogen, Random          Urobilinogen, UA          WBC, UA          WBC          Yeast, UA                      08/20/17 2002 08/20/17 2000 08/20/17  1813      Anion Gap  10      Appearance, UA        Bacteria, UA        Baso #        Basophil%        Bilirubin (UA)        BUN, Bld  16      Calcium  9.3      Chloride  111(H)      CO2  20(L)      Color, UA        Creatinine  1.8(H)      Creatinine, Random Ur        Differential Method        eGFR if   44.0(A)      eGFR if non   38.1  Comment:  Calculation used to obtain the estimated glomerular filtration  rate (eGFR) is the CKD-EPI equation. Since race is unknown   in our information system, the eGFR values for   -American and Non--American patients are given   for each creatinine result.  (A)      Eos #        Eosinophil%        Glucose  262(H)      Glucose, UA        Gran #        Gran%        Hematocrit        Hemoglobin        Hyaline Casts, UA        Ketones, UA        Leukocytes, UA        Lymph #        Lymph%        Magnesium        MCH        MCHC        MCV        Microscopic Comment        Mono #        Mono%        MPV        Nitrite, UA        Occult Blood UA        pH, UA        Phosphorus        Platelets        POCT Glucose 284(H)  294(H)     Potassium  3.9      Protein, UA        RBC        RBC, UA        RDW        Sodium  141      Sodium Urine Random        Specific Gravity, UA        Specimen UA        Urine Urea Nitrogen, Random        Urobilinogen, UA        WBC, UA        WBC        Yeast, UA              Significant Imaging: I have reviewed all pertinent imaging results/findings within the past 24 hours.

## 2017-08-21 NOTE — SUBJECTIVE & OBJECTIVE
"Interval HPI:   Overnight events: none, confusion improved by a small amount per wife. TFs running at 50ml/hr at goal. Denies any nausea, abd pain, discomfort  Eatin%  Nausea: No  Hypoglycemia and intervention: No  Fever: No  TPN and/or TF: Yes  If yes, type of TF/TPN and rate: Diabetasource full strength, 50cc/hr continuous    /60 (BP Location: Left arm, Patient Position: Lying)   Pulse 89   Temp 99.5 °F (37.5 °C) (Axillary)   Resp 18   Ht 5' 7" (1.702 m)   Wt 66.3 kg (146 lb 2.6 oz)   SpO2 (!) 94%   BMI 22.89 kg/m²     Labs Reviewed and Include      Recent Labs  Lab 17  0806   *   CALCIUM 8.6*      K 3.8   CO2 20*   *   BUN 20   CREATININE 1.8*     Lab Results   Component Value Date    WBC 15.70 (H) 2017    HGB 11.7 (L) 2017    HCT 35.1 (L) 2017    MCV 86 2017     2017       Recent Labs  Lab 17  0930   TSH 2.131     Lab Results   Component Value Date    HGBA1C 10.0 (H) 2017       Nutritional status:   Body mass index is 22.89 kg/m².  Lab Results   Component Value Date    ALBUMIN 1.9 (L) 2017    ALBUMIN 2.0 (L) 2017    ALBUMIN 2.0 (L) 2017     No results found for: PREALBUMIN    Estimated Creatinine Clearance: 37.2 mL/min (based on Cr of 1.8).    Accu-Checks  Recent Labs      17   1608  17   1813  17   2002  17   2219  17   0000  17   0211  17   0354  17   0555  17   0814  17   1007   POCTGLUCOSE  250*  294*  284*  274*  267*  205*  252*  246*  327*  213*       Current Medications and/or Treatments Impacting Glycemic Control  Immunotherapy:  Immunosuppressants     None        Steroids:   Hormones     None        Pressors:    Autonomic Drugs     None        Hyperglycemia/Diabetes Medications: Antihyperglycemics     Start     Stop Route Frequency Ordered    17 1110  insulin regular (Humulin R) 100 Units in sodium chloride 0.9% 100 mL " infusion      -- IV Continuous 08/20/17 6408

## 2017-08-22 PROBLEM — E10.65 UNCONTROLLED TYPE 1 DIABETES MELLITUS WITH HYPERGLYCEMIA: Status: ACTIVE | Noted: 2017-08-20

## 2017-08-22 LAB
ACETOHEXAMIDE SERPL-MCNC: NEGATIVE NG/ML
ANION GAP SERPL CALC-SCNC: 5 MMOL/L
ANION GAP SERPL CALC-SCNC: 5 MMOL/L
ANION GAP SERPL CALC-SCNC: 8 MMOL/L
ANNOTATION COMMENT IMP: NORMAL
BASOPHILS # BLD AUTO: 0.03 K/UL
BASOPHILS NFR BLD: 0.3 %
BUN SERPL-MCNC: 22 MG/DL
CALCIUM SERPL-MCNC: 8.4 MG/DL
CALCIUM SERPL-MCNC: 8.5 MG/DL
CALCIUM SERPL-MCNC: 8.5 MG/DL
CHLORIDE SERPL-SCNC: 107 MMOL/L
CHLORIDE SERPL-SCNC: 108 MMOL/L
CHLORIDE SERPL-SCNC: 108 MMOL/L
CHLORPROPAMIDE SERPL-MCNC: NEGATIVE NG/ML
CO2 SERPL-SCNC: 23 MMOL/L
CO2 SERPL-SCNC: 25 MMOL/L
CO2 SERPL-SCNC: 27 MMOL/L
CREAT SERPL-MCNC: 1.7 MG/DL
CREAT SERPL-MCNC: 1.8 MG/DL
CREAT SERPL-MCNC: 1.8 MG/DL
DIFFERENTIAL METHOD: ABNORMAL
EOSINOPHIL # BLD AUTO: 0.3 K/UL
EOSINOPHIL NFR BLD: 2.8 %
ERYTHROCYTE [DISTWIDTH] IN BLOOD BY AUTOMATED COUNT: 14.2 %
EST. GFR  (AFRICAN AMERICAN): 44 ML/MIN/1.73 M^2
EST. GFR  (AFRICAN AMERICAN): 44 ML/MIN/1.73 M^2
EST. GFR  (AFRICAN AMERICAN): 47.2 ML/MIN/1.73 M^2
EST. GFR  (NON AFRICAN AMERICAN): 38.1 ML/MIN/1.73 M^2
EST. GFR  (NON AFRICAN AMERICAN): 38.1 ML/MIN/1.73 M^2
EST. GFR  (NON AFRICAN AMERICAN): 40.8 ML/MIN/1.73 M^2
GAD65 AB SER-SCNC: 0.01 NMOL/L
GLIMEPIRIDE SERPL-MCNC: NEGATIVE NG/ML
GLIPIZIDE SERPL-MCNC: NEGATIVE NG/ML
GLUCOSE SERPL-MCNC: 180 MG/DL
GLUCOSE SERPL-MCNC: 180 MG/DL
GLUCOSE SERPL-MCNC: 191 MG/DL
GLYBURIDE SERPL-MCNC: NEGATIVE NG/ML
HCT VFR BLD AUTO: 30.5 %
HGB BLD-MCNC: 10 G/DL
LYMPHOCYTES # BLD AUTO: 1.4 K/UL
LYMPHOCYTES NFR BLD: 11.6 %
MCH RBC QN AUTO: 28.2 PG
MCHC RBC AUTO-ENTMCNC: 32.8 G/DL
MCV RBC AUTO: 86 FL
MONOCYTES # BLD AUTO: 0.9 K/UL
MONOCYTES NFR BLD: 7.1 %
NEUTROPHILS # BLD AUTO: 9.2 K/UL
NEUTROPHILS NFR BLD: 77.4 %
PLATELET # BLD AUTO: 217 K/UL
PMV BLD AUTO: 10.6 FL
POCT GLUCOSE: 134 MG/DL (ref 70–110)
POCT GLUCOSE: 178 MG/DL (ref 70–110)
POCT GLUCOSE: 200 MG/DL (ref 70–110)
POCT GLUCOSE: 203 MG/DL (ref 70–110)
POCT GLUCOSE: 209 MG/DL (ref 70–110)
POCT GLUCOSE: 216 MG/DL (ref 70–110)
POTASSIUM SERPL-SCNC: 3.7 MMOL/L
POTASSIUM SERPL-SCNC: 3.8 MMOL/L
POTASSIUM SERPL-SCNC: 3.8 MMOL/L
RBC # BLD AUTO: 3.54 M/UL
REPAGLINIDE SERPL-MCNC: NEGATIVE NG/ML
SODIUM SERPL-SCNC: 138 MMOL/L
SODIUM SERPL-SCNC: 139 MMOL/L
SODIUM SERPL-SCNC: 139 MMOL/L
TOLAZAMIDE SERPL-MCNC: NORMAL NG/ML
TOLBUTAMIDE SERPL-MCNC: NEGATIVE NG/ML
WBC # BLD AUTO: 11.9 K/UL

## 2017-08-22 PROCEDURE — 80048 BASIC METABOLIC PNL TOTAL CA: CPT | Mod: 91

## 2017-08-22 PROCEDURE — 51798 US URINE CAPACITY MEASURE: CPT

## 2017-08-22 PROCEDURE — 97535 SELF CARE MNGMENT TRAINING: CPT

## 2017-08-22 PROCEDURE — G8997 SWALLOW GOAL STATUS: HCPCS | Mod: CI

## 2017-08-22 PROCEDURE — 99232 SBSQ HOSP IP/OBS MODERATE 35: CPT | Mod: ,,, | Performed by: HOSPITALIST

## 2017-08-22 PROCEDURE — A4216 STERILE WATER/SALINE, 10 ML: HCPCS | Performed by: INTERNAL MEDICINE

## 2017-08-22 PROCEDURE — 80048 BASIC METABOLIC PNL TOTAL CA: CPT

## 2017-08-22 PROCEDURE — 85025 COMPLETE CBC W/AUTO DIFF WBC: CPT

## 2017-08-22 PROCEDURE — 99232 SBSQ HOSP IP/OBS MODERATE 35: CPT | Mod: ,,, | Performed by: INTERNAL MEDICINE

## 2017-08-22 PROCEDURE — 25000003 PHARM REV CODE 250: Performed by: STUDENT IN AN ORGANIZED HEALTH CARE EDUCATION/TRAINING PROGRAM

## 2017-08-22 PROCEDURE — 25000003 PHARM REV CODE 250: Performed by: INTERNAL MEDICINE

## 2017-08-22 PROCEDURE — 63600175 PHARM REV CODE 636 W HCPCS: Performed by: INTERNAL MEDICINE

## 2017-08-22 PROCEDURE — 36415 COLL VENOUS BLD VENIPUNCTURE: CPT

## 2017-08-22 PROCEDURE — 20600001 HC STEP DOWN PRIVATE ROOM

## 2017-08-22 PROCEDURE — 92610 EVALUATE SWALLOWING FUNCTION: CPT

## 2017-08-22 PROCEDURE — 63600175 PHARM REV CODE 636 W HCPCS: Performed by: HOSPITALIST

## 2017-08-22 PROCEDURE — G8996 SWALLOW CURRENT STATUS: HCPCS | Mod: CJ

## 2017-08-22 RX ORDER — ONDANSETRON 2 MG/ML
4 INJECTION INTRAMUSCULAR; INTRAVENOUS EVERY 6 HOURS PRN
Status: DISCONTINUED | OUTPATIENT
Start: 2017-08-22 | End: 2017-08-23

## 2017-08-22 RX ORDER — ACYCLOVIR 200 MG/5ML
25 SUSPENSION, ORAL (FINAL DOSE FORM) ORAL DAILY
Status: DISCONTINUED | OUTPATIENT
Start: 2017-08-23 | End: 2017-08-22

## 2017-08-22 RX ORDER — INSULIN ASPART 100 [IU]/ML
2 INJECTION, SOLUTION INTRAVENOUS; SUBCUTANEOUS EVERY 4 HOURS
Status: DISCONTINUED | OUTPATIENT
Start: 2017-08-22 | End: 2017-08-22

## 2017-08-22 RX ORDER — ACYCLOVIR 200 MG/5ML
25 SUSPENSION, ORAL (FINAL DOSE FORM) ORAL DAILY
Status: DISCONTINUED | OUTPATIENT
Start: 2017-08-23 | End: 2017-08-23 | Stop reason: ALTCHOICE

## 2017-08-22 RX ORDER — METOPROLOL TARTRATE 25 MG/1
50 TABLET, FILM COATED ORAL 2 TIMES DAILY
Status: DISCONTINUED | OUTPATIENT
Start: 2017-08-22 | End: 2017-08-25 | Stop reason: HOSPADM

## 2017-08-22 RX ADMIN — HEPARIN SODIUM 5000 UNITS: 5000 INJECTION, SOLUTION INTRAVENOUS; SUBCUTANEOUS at 02:08

## 2017-08-22 RX ADMIN — INSULIN ASPART 2 UNITS: 100 INJECTION, SOLUTION INTRAVENOUS; SUBCUTANEOUS at 12:08

## 2017-08-22 RX ADMIN — INSULIN ASPART 2 UNITS: 100 INJECTION, SOLUTION INTRAVENOUS; SUBCUTANEOUS at 07:08

## 2017-08-22 RX ADMIN — CLOPIDOGREL 75 MG: 75 TABLET, FILM COATED ORAL at 08:08

## 2017-08-22 RX ADMIN — Medication 3 ML: at 02:08

## 2017-08-22 RX ADMIN — INSULIN ASPART 1 UNITS: 100 INJECTION, SOLUTION INTRAVENOUS; SUBCUTANEOUS at 12:08

## 2017-08-22 RX ADMIN — ATORVASTATIN CALCIUM 40 MG: 10 TABLET, FILM COATED ORAL at 08:08

## 2017-08-22 RX ADMIN — METOPROLOL TARTRATE 50 MG: 25 TABLET ORAL at 08:08

## 2017-08-22 RX ADMIN — METOPROLOL TARTRATE 50 MG: 50 TABLET, FILM COATED ORAL at 08:08

## 2017-08-22 RX ADMIN — Medication 100 MG: at 08:08

## 2017-08-22 RX ADMIN — HEPARIN SODIUM 5000 UNITS: 5000 INJECTION, SOLUTION INTRAVENOUS; SUBCUTANEOUS at 08:08

## 2017-08-22 RX ADMIN — ASPIRIN 81 MG CHEWABLE TABLET 81 MG: 81 TABLET CHEWABLE at 08:08

## 2017-08-22 RX ADMIN — INSULIN ASPART 2 UNITS: 100 INJECTION, SOLUTION INTRAVENOUS; SUBCUTANEOUS at 08:08

## 2017-08-22 RX ADMIN — HEPARIN SODIUM 5000 UNITS: 5000 INJECTION, SOLUTION INTRAVENOUS; SUBCUTANEOUS at 05:08

## 2017-08-22 RX ADMIN — INSULIN ASPART 1 UNITS: 100 INJECTION, SOLUTION INTRAVENOUS; SUBCUTANEOUS at 04:08

## 2017-08-22 NOTE — PLAN OF CARE
Problem: Infection, Risk/Actual (Adult)  Intervention: Manage Suspected/Actual Infection  Pt vss. Discontinued cardiac monitor & Mitten restraints. Pt has condom catheter. Bladder scan changed from q6hrs to prn. Pt putting out urine. Insulin drip running at 1.6 and tpn running at 50ml/hr. Pt. Put on a puree diet.

## 2017-08-22 NOTE — PROGRESS NOTES
"[facility]  Endocrinology  Progress Note    Admit Date: 8/18/2017     Mr. Cousin is a 66 y/o PMH DM (unclear if Type I vs II), CHF (unk. EF), HTN, EtOH abuse, EILEEN vs CKD who presents as a transfer from [facility] after presenting with chief complaint of AMS. Currently encephalopathic and no family available for collateral history. Per transfer report presented 08/08/2017 with acute personality changes and confusion noticed by wife. Per their report was in "mild" ketoacidosis. Apparently blood glucose values had been extremely labile with patient fluctuating between hypoglycemia and hyperglycemia with minimal insulin so transferred for additional endocrinology evaluation. Unknown length of duration of DM diagnosis or home medications.     Per report mental status has been disorented x3, with agitation and combativeness requiring restraints. CT head at OSH notable for chronic ischemic changes but no acute abnormality. Evaluated by Neurology there who suspected multifactorial metabolic encephalopathy. Also treated for possible EtOH withdrawal.     Reason for Consult: Management of T2DM, Hyperglycemia     Surgical Procedure and Date: unknown    Diabetes diagnosis year: unknown    Home Diabetes Medications:  unknown    How often checking glucose at home? Unknown   BG readings on regimen: Unknown  Hypoglycemia on the regimen?  unknown  Missed doses on regimen? unknown    Diabetes Complications include:     Diabetic chronic kidney disease      and Amputation status    Complicating diabetes co morbidities:   CHF and CKD        Interval HPI:   Overnight events: none, tolerating tube feedings well. Trying to climb out of bed so put in soft restraints.   Eating:   TFs  Nausea: No  Hypoglycemia and intervention: No  Fever: No  TPN and/or TF: Yes  If yes, type of TF/TPN and rate: Full strength Diabetasource at goal 50cc/hr    /70 (BP Location: Left arm, Patient Position: Lying)   Pulse 76   " "Temp 98.8 °F (37.1 °C) (Oral)   Resp 18   Ht 5' 7" (1.702 m)   Wt 66.4 kg (146 lb 6.2 oz)   SpO2 96%   BMI 22.93 kg/m²       Labs Reviewed and Include      Recent Labs  Lab 08/22/17  0753   *   CALCIUM 8.5*      K 3.7   CO2 27      BUN 22   CREATININE 1.7*     Lab Results   Component Value Date    WBC 11.90 08/22/2017    HGB 10.0 (L) 08/22/2017    HCT 30.5 (L) 08/22/2017    MCV 86 08/22/2017     08/22/2017       Recent Labs  Lab 08/19/17  0930   TSH 2.131     Lab Results   Component Value Date    HGBA1C 10.0 (H) 08/18/2017       Nutritional status:   Body mass index is 22.93 kg/m².  Lab Results   Component Value Date    ALBUMIN 1.9 (L) 08/19/2017    ALBUMIN 2.0 (L) 08/19/2017    ALBUMIN 2.0 (L) 08/18/2017     No results found for: PREALBUMIN    Estimated Creatinine Clearance: 39.4 mL/min (based on Cr of 1.7).    Accu-Checks  Recent Labs      08/21/17   0354  08/21/17   0555  08/21/17   0814  08/21/17   1007  08/21/17   1209  08/21/17   1628  08/21/17   2024  08/22/17   0011  08/22/17   0415  08/22/17   0845   POCTGLUCOSE  252*  246*  327*  213*  291*  210*  303*  216*  200*  209*       Current Medications and/or Treatments Impacting Glycemic Control  Immunotherapy:  Immunosuppressants     None        Steroids:   Hormones     None        Pressors:    Autonomic Drugs     None        Hyperglycemia/Diabetes Medications: Antihyperglycemics     Start     Stop Route Frequency Ordered    08/22/17 1015  insulin aspart pen 2 Units      -- SubQ Every 4 hours 08/22/17 0901    08/21/17 1408  insulin regular (Humulin R) 100 Units in sodium chloride 0.9% 100 mL infusion      -- IV Continuous 08/21/17 1408    08/21/17 1259  insulin aspart pen 0-5 Units      -- SubQ As needed (PRN) 08/21/17 1200          ASSESSMENT and PLAN    Uncontrolled type 1 diabetes mellitus with hyperglycemia, with long-term current use of insulin     -   -C-peptide undetectable with BS in 400s; however, may be due to stunning " "and needs repeat after DKA resolved  -Agree with sending KATELIN Ab, still pending from 08/19  -Anion gap closed, but remains acidotic, would consider additional concurrent non-gap acidosis (EILEEN vs RTA4)  -Tolerating tube feeds at goal, keep on continuous feedings at goal   -increase transition gtt at 1.5         * Acute metabolic encephalopathy     -Workup per primary team  -Encephalopathy seems far out of proportion from DKA alone particularly if reported as "mild" on initial OSH presentation; however, do not have labwork to confirm  -TSH nl  -Cortisol ok       Chronic combined systolic and diastolic heart failure     -EF per report 45% with diastolic dysfunction  -On lipitor  -On beta blocker  -Not on ACE/ARB, possibly 2/2 EILEEN vs CKD     avoid hypoglycemia        CAD (coronary artery disease)     -Per primary  -On lipitor  Avoid hypoglycemia           Essential hypertension     -Managed per primary  -Long term BP goal at least <140/<80       Ron Cabral IV, MD  Endocrinology  [facility], Yanci Hernandez MD,  have personally taken the history and examined the patient and agree with the resident's note as stated above.    "

## 2017-08-22 NOTE — SUBJECTIVE & OBJECTIVE
"Interval HPI:   Overnight events: none, tolerating tube feedings well. Trying to climb out of bed so put in soft restraints.   Eating:   TFs  Nausea: No  Hypoglycemia and intervention: No  Fever: No  TPN and/or TF: Yes  If yes, type of TF/TPN and rate: Full strength Diabetasource at goal 50cc/hr    /70 (BP Location: Left arm, Patient Position: Lying)   Pulse 76   Temp 98.8 °F (37.1 °C) (Oral)   Resp 18   Ht 5' 7" (1.702 m)   Wt 66.4 kg (146 lb 6.2 oz)   SpO2 96%   BMI 22.93 kg/m²     Labs Reviewed and Include      Recent Labs  Lab 08/22/17  0753   *   CALCIUM 8.5*      K 3.7   CO2 27      BUN 22   CREATININE 1.7*     Lab Results   Component Value Date    WBC 11.90 08/22/2017    HGB 10.0 (L) 08/22/2017    HCT 30.5 (L) 08/22/2017    MCV 86 08/22/2017     08/22/2017       Recent Labs  Lab 08/19/17  0930   TSH 2.131     Lab Results   Component Value Date    HGBA1C 10.0 (H) 08/18/2017       Nutritional status:   Body mass index is 22.93 kg/m².  Lab Results   Component Value Date    ALBUMIN 1.9 (L) 08/19/2017    ALBUMIN 2.0 (L) 08/19/2017    ALBUMIN 2.0 (L) 08/18/2017     No results found for: PREALBUMIN    Estimated Creatinine Clearance: 39.4 mL/min (based on Cr of 1.7).    Accu-Checks  Recent Labs      08/21/17   0354  08/21/17   0555  08/21/17   0814  08/21/17   1007  08/21/17   1209  08/21/17   1628  08/21/17   2024  08/22/17   0011  08/22/17   0415  08/22/17   0845   POCTGLUCOSE  252*  246*  327*  213*  291*  210*  303*  216*  200*  209*       Current Medications and/or Treatments Impacting Glycemic Control  Immunotherapy:  Immunosuppressants     None        Steroids:   Hormones     None        Pressors:    Autonomic Drugs     None        Hyperglycemia/Diabetes Medications: Antihyperglycemics     Start     Stop Route Frequency Ordered    08/22/17 1015  insulin aspart pen 2 Units      -- SubQ Every 4 hours 08/22/17 0901    08/21/17 1408  insulin regular (Humulin R) 100 Units in " sodium chloride 0.9% 100 mL infusion      -- IV Continuous 08/21/17 1408    08/21/17 1259  insulin aspart pen 0-5 Units      -- SubQ As needed (PRN) 08/21/17 1200

## 2017-08-22 NOTE — PLAN OF CARE
Pt opens eyes to voice but easily falls asleep with stimulation, not conversant with bilateral hand mittens in place.  IV insulin drip infusing and TPN infusing.  Spouse at bedside stating pt asked for coffee this am taking few sips.  CM discussed discharge plan with spouse stating she doesn't think she can handle him at home and is requesting SW to talk with her on resources.  KANU notified KYLE Baldwin, of resources for referrals needed for spouse.  KANU will continue to follow with any needs.

## 2017-08-22 NOTE — ASSESSMENT & PLAN NOTE
Type II, seen in clinic in 2016 with dx type 2; however, unable to obtain collateral and possibly Type I, per family on insulin gtt till CVA in 05/2017 and since on basal bolus per wife  -Would suspect chronic diagnosis given AKA, CAD, possible CKD  -C-peptide undetectable with BS in 400s; however, may be due to stunning and needs repeat after DKA resolved  - KATELIN Ab is normal  -Anion gap closed, corrected anion gap borderline at 17 this AM but remains acidotic, would consider additional concurrent non-gap acidosis (EILEEN vs RTA4)  -Tolerating tube feeds at goal, keep on continuous feedings at goal today if tolerated

## 2017-08-22 NOTE — PLAN OF CARE
SW spoke with pt and spouse in room to discuss d/c options.  Pt is on TPN and insulin drip, and it is not likely that pt will be capable of enduring rehab therapy regimen at this time.  SW explained this to spouse and stated that SNF is more likely, however, Tx team will be looking at all possibilities and reviewing options for appropriate level of care once medically stable.  Spouse stated that she would like referrals sent to [facility] as well as [facility] in [place].  KYLE and Nathen Fleming, will discuss with Tx team on 8/23.  Spouse understands that pt is not d/c for at least 48 hours.      Nicolasa Norman, KYLE  Ext [phone]

## 2017-08-22 NOTE — PT/OT/SLP EVAL
Speech Language Pathology  Bedside Swallow Study  Evaluation    Brandon Simmons   MRN: [id number]   1066/1066 A    Admitting Diagnosis: Acute metabolic encephalopathy    Diet recommendations: Solid Diet Level: Puree  Liquid Diet Level: Thin   crushed po medications, small bites/sips, check for oral residue/pocketing, slow rate, assistance with meals    Continue to monitor for signs and symptoms of aspiration and discontinue oral feeding should you notice any of the following: watery eyes, reddened facial area, wet vocal quality, increased work of breathing, change in respiratory status, increased congestion, coughing, fever, etc.      SLP Treatment Date: 08/22/17  Speech Start Time: 1455     Speech Stop Time: 1514     Speech Total (min): 19 min       TREATMENT BILLABLE MINUTES:  Eval Swallow and Oral Function 10 and Seld Care/Home Management Training 9    Diagnosis: Acute metabolic encephalopathy        Past Medical History:   Diagnosis Date    Anticoagulant long-term use     Arthritis     Blood transfusion     CHF (congestive heart failure)     Coronary artery disease     Diabetes mellitus     Hypertension     Renal disorder      Past Surgical History:   Procedure Laterality Date    CARDIAC SURGERY      COLONOSCOPY N/A 10/16/2016    Procedure: COLONOSCOPY;  Surgeon: Jamar White MD;  Location: [facility];  Service: Endoscopy;  Laterality: N/A;    CORONARY ARTERY BYPASS GRAFT  2000    LEG AMPUTATION THROUGH LOWER TIBIA AND FIBULA      right leg - prosthesis in place    stents  left leg       Has the patient been evaluated by SLP for swallowing? : Yes  Keep patient NPO?: No   General Precautions: Standard, fall, aspiration, pureed diet            Prior diet: Per wife, pt with a h/o dysphagia; wife did not report any previous need for thickener or h/o aspiration. Pt with a PEG tube. Wife reported PEG placement was 2/2 poor intake. Pt currently receiving PEG feedings.        Subjective:  Pt awake and  cooperative. HOB elevated and pt repositioned with PCT.   Patient goals: none stated.    Pain/Comfort  Pain Rating 1: 0/10    Objective:        Oral Musculature Evaluation  Oral Musculature: unable to assess due to poor participation/comprehension  Dentition: edentulous, uses dentures to eat  Mucosal Quality: adequate  Lingual Strength and Mobility: functional strength (imapired coordination)  Volitional Cough: elicited; productive  Volitional Swallow: timely  Voice Prior to PO Intake: clear     Bedside Swallow Eval:  Consistencies Assessed: Thin liquids sips of water x5, sips of water via straw x3 and Puree tsp bites of pudding x5   Oral Phase: bolus holding; requires cues and/or liquid wash to swallow  Pharyngeal Phase: no overt clinical  signs/symptoms of aspiration and no overt clinical signs/symptoms of pharyngeal dysphagia    Bedside swallow study completed. Pt with oral dysphagia c/b bolus holding and slow oral transit. Recommend: Puree diet, thin liquids, crushed po medications, small bites/sips, check for oral residue/pocketing, slow rate. ST will continue to follow. Pt would benefit from a Ibemvg-Swxxlvhz-Fjstwrpzo Evaluation 2/2 family reporting pt not at baseline and noted impaired ability to follow directions, tangential speech, and evident confusion.     Assessment:  Brandon GABRIEL Cousin is a 67 y.o. male with a medical diagnosis of Acute metabolic encephalopathy and presents with Oral dyspahgia. ST will continue to follow. Pt would benefit from a Shragf-Xceosxob-Spuzbxnay Evaluation.           Discharge recommendations: Discharge Facility/Level Of Care Needs: nursing facility, skilled     Goals:    SLP Goals        Problem: SLP Goal    Goal Priority Disciplines Outcome   SLP Goal     SLP Ongoing (interventions implemented as appropriate)   Description:  Speech Therapy Short Term Goals  Goals expected to be met by 8/29  1. Pt will tolerate puree diet and thin liquids with no overt s/s of aspiration noted.    2. Pt will tolerate dental soft trials x5+ with no overt s/s of aspiration and adequate oral clearance.   3. Pt will participate in a speech, language, and cognitive evaluation with possible updated goals to follow pending results.                       Plan:   Patient to be seen Therapy Frequency: 5 x/week   Plan of Care expires: 09/20/17  Plan of Care reviewed with: patient, spouse  SLP Follow-up?: Yes         SLP G-Codes  Functional Assessment Tool Used: noms  Score: 5  Functional Limitations: Swallowing  Swallow Current Status (): МАРИНА  Swallow Goal Status (): RERE Underwood, CCC-SLP   Pager: [phone]  08/22/2017

## 2017-08-22 NOTE — PLAN OF CARE
Problem: Patient Care Overview  Goal: Plan of Care Review  No acute changes overnight. Pt alert and oriented to self only. Wrist restraints removed from pt and mittens added. Restraint checks performed q 2 hrs. Restraint order to  17 @ 2149. Pt preventing treatment at times but calm for the most part. Bladder scanning performed q 6 hrs. Pt voiding independently and has not required straight cath. Condom cath placed on pt to attempt better I & O's. Pt's TF continued at goal rate of 50 ml's/hr. Pt tolerating well and no residuals noted. HOB kept @ 30 degrees at all times. Water bolus administered q 6 hrs. Remains on titrating insulin gtt. Currently infusing at 1.3 u/hr. Glucose monitoring performed q 4 hrs. SSI administered as needed.  Pt running NSR on tele.  No other issues noted. Wife at bedside. Will continue to monitor.

## 2017-08-22 NOTE — SUBJECTIVE & OBJECTIVE
Interval History:NEON    Review of Systems   Unable to perform ROS: Other     Objective:     Vital Signs (Most Recent):  Temp: 98.3 °F (36.8 °C) (08/22/17 1528)  Pulse: 70 (08/22/17 1528)  Resp: 18 (08/22/17 0731)  BP: 134/62 (08/22/17 1528)  SpO2: 95 % (08/22/17 1528) Vital Signs (24h Range):  Temp:  [98.2 °F (36.8 °C)-98.9 °F (37.2 °C)] 98.3 °F (36.8 °C)  Pulse:  [66-77] 70  Resp:  [18] 18  SpO2:  [93 %-99 %] 95 %  BP: (131-175)/(58-79) 134/62     Weight: 66.4 kg (146 lb 6.2 oz)  Body mass index is 22.93 kg/m².    Intake/Output Summary (Last 24 hours) at 08/22/17 1608  Last data filed at 08/22/17 1200   Gross per 24 hour   Intake          2172.11 ml   Output              325 ml   Net          1847.11 ml      Physical Exam   Constitutional: He is oriented to person, place, and time. He appears well-developed.   HENT:   Head: Normocephalic.   Cardiovascular: Normal rate, regular rhythm and normal heart sounds.    No murmur heard.  Pulmonary/Chest: Effort normal and breath sounds normal.   Abdominal: Soft. Bowel sounds are normal. He exhibits no distension. There is no tenderness.   Has a PEG tube   Neurological: He is alert and oriented to person, place, and time.       Significant Labs:   CMP:   Recent Labs  Lab 08/22/17  0057 08/22/17  0345 08/22/17  0753    139 139   K 3.8 3.8 3.7    108 107   CO2 25 23 27   * 180* 180*   BUN 22 22 22   CREATININE 1.8* 1.8* 1.7*   CALCIUM 8.4* 8.5* 8.5*   ANIONGAP 5* 8 5*   EGFRNONAA 38.1* 38.1* 40.8*       Significant Imaging: I have reviewed and interpreted all pertinent imaging results/findings within the past 24 hours.

## 2017-08-22 NOTE — MEDICAL/APP STUDENT
"Progress Note  [facility] Medicine    Patient Name: Brandon Simmons  YOB: 1950    Admit Date: 8/18/2017                     LOS: 4    SUBJECTIVE:     Reason for Admission:  Acute metabolic encephalopathy  See H&P for detailed presentating history and ROS.      Interval history: Oriented to person only. Simple answers ("yes", "no", "ok", "don't know") to questions. No complaints overnight. Tolerating two sips of coffee.     Review of Systems   Constitutional: Negative for chills, fever and malaise/fatigue.   HENT: Positive for sore throat.    Eyes: Negative for blurred vision and double vision.   Respiratory: Negative for cough, hemoptysis and shortness of breath.    Cardiovascular: Negative for chest pain, palpitations and orthopnea.   Gastrointestinal: Negative for constipation, diarrhea, heartburn, nausea and vomiting.   Musculoskeletal: Negative for myalgias and neck pain.   Neurological: Negative for dizziness, tingling, tremors and headaches.       OBJECTIVE:     Vital Signs Range (Last 24H):  Temp:  [98.2 °F (36.8 °C)-98.9 °F (37.2 °C)]   Pulse:  [66-77]   Resp:  [18]   BP: (131-175)/(58-79)   SpO2:  [93 %-99 %] Body mass index is 22.93 kg/m².  Wt Readings from Last 1 Encounters:   08/22/17 0300 66.4 kg (146 lb 6.2 oz)   08/21/17 0800 66.3 kg (146 lb 2.6 oz)   08/21/17 0500 66.3 kg (146 lb 2.6 oz)   08/19/17 0800 62.8 kg (138 lb 7.2 oz)   08/18/17 2229 62.8 kg (138 lb 7.2 oz)       I & O (Last 24H):  Intake/Output Summary (Last 24 hours) at 08/22/17 1544  Last data filed at 08/22/17 1200   Gross per 24 hour   Intake          2172.11 ml   Output              325 ml   Net          1847.11 ml       Physical Exam   HENT:   Head: Normocephalic and atraumatic.   Eyes: Right eye exhibits no discharge. Left eye exhibits no discharge.   Neck: No JVD present. No tracheal deviation present.   Cardiovascular: Normal rate and intact distal pulses.  Exam reveals no gallop and no friction rub.    Pulmonary/Chest: " Effort normal and breath sounds normal. No respiratory distress. He has no wheezes.   Abdominal: Soft. Bowel sounds are normal. He exhibits no distension. There is no tenderness. Tube feed in place   Musculoskeletal: He exhibits no edema.   Skin: Skin is warm and dry.     Diagnostic Results:  Lab Results   Component Value Date    WBC 11.90 08/22/2017    HGB 10.0 (L) 08/22/2017    HCT 30.5 (L) 08/22/2017    MCV 86 08/22/2017     08/22/2017       Recent Labs  Lab 08/22/17  0753   *      K 3.7      CO2 27   BUN 22   CREATININE 1.7*   CALCIUM 8.5*     Lab Results   Component Value Date    INR 1.1 08/18/2017    INR 1.0 10/15/2016    INR 1.0 08/25/2016     Lab Results   Component Value Date    HGBA1C 10.0 (H) 08/18/2017     Recent Labs      08/21/17   1007  08/21/17   1209  08/21/17   1628  08/21/17   2024  08/22/17   0011  08/22/17   0415  08/22/17   0845  08/22/17   1205   POCTGLUCOSE  213*  291*  210*  303*  216*  200*  209*  178*       ASSESSMENT/PLAN:     Active Hospital Problems    Diagnosis  POA    *Acute metabolic encephalopathy [G93.41]  Yes    On tube feeding diet [Z78.9]  Yes    Suspected deep tissue injury [Z04.9]  Not Applicable    Uncontrolled type 1 diabetes mellitus with hyperglycemia [E10.65]  Yes    CAD (coronary artery disease) [I25.10]  Yes    CKD (chronic kidney disease) stage 3, GFR 30-59 ml/min [N18.3]  Yes    Essential hypertension [I10]  Yes    Chronic combined systolic and diastolic heart failure [I50.42]  Yes    Leukocytosis [D72.829]  Yes    Elevated troponin [R74.8]  Yes    High anion gap metabolic acidosis [E87.2]  Yes    Alcohol intake above recommended sensible limits [Z72.89]  Yes    History of Left-sided intracerebral hemorrhage [I61.2]  Yes    Diabetic ketoacidosis [E13.10]  Yes    EILEEN (acute kidney injury) [N17.9]  Yes    PVD (peripheral vascular disease) [I73.9]  Yes    Anemia [D64.9]  Yes      Resolved Hospital Problems    Diagnosis Date  Resolved POA    Uncontrolled type 2 diabetes mellitus with ketoacidosis without coma, with long-term current use of insulin [E13.10, Z79.4] 08/20/2017 Not Applicable       Problems Addressed Today:  Acute Metabolic Encephalopathy  Patient oriented to person only, but has improved significantly since admit. Possibly delirium  - TSH nl  - Cortisol ok  - EEG shows no evidence of an epileptic process. Overall degree of slowing and disorganization suggestive of mild-to-moderate encephalopathy  - neurology continues to follow, as well as PT/OT  - Per Speech, recommend starting with puree diet, thin liquids.      Diabetic ketoacidosis  Previous been diagnosed with Type II DM, per family on insulin gtt till CVA in 05/2017 and since on basal bolus per wife  - endocrine workup suspect possible Type I diagnosis, once DKA resolved, would need repeat c-peptide   - suspect DKA due to chronic diagnosis given AKA, CAD, possible CKD  - KATELIN Ab normal  - anion gap closed with normal bicarb, continue to monitor  - tolerating tube feeds, encourage po intake as tolerated  - per endocrinology, transition gtt at 1.6u/hr with 0-5u aspart correction scale q4h    CKD stage 3, GFR 30-59 ml/min  - EF shows 45% with diastolic dysfunction  - continue lipitor and beta-blocker  - Cr still elevated 1.9->1.7 today, resume ACEi/ARB once EILEEN vs CKD resolved  - avoid hypoglycemia    EILEEN  - Cr 1.7 today (1.6 on admission)  - urine lytes 144    Anemia  - stable H/H 10/30.5  - continue to monitor    Hypothyroidism  - resume home med levothyroxine    Sofía Jaime (Shelly)  MS3  8/22/17

## 2017-08-22 NOTE — ASSESSMENT & PLAN NOTE
"-Encephalopathy seems far out of proportion from DKA alone particularly if reported as "mild" on initial OSH presentation; however, do not have labwork to confirm  -TSH nl  -Cortisol ok  -neurology is following, EEG showed no seizure activity only mild encephalopaty pic  - Improved but not to baseline    "

## 2017-08-22 NOTE — PROGRESS NOTES
[facility] Hospital Medicine  Progress Note    Patient Name: Brandon Simmons  MRN: [id number]  Patient Class: IP- Inpatient   Admission Date: 8/18/2017  Length of Stay: 4 days  Attending Physician: Josefina Douglass MD  Primary Care Provider: Neville Willis MD    Hospital Medicine Team: [facility] HOSP MED 5 Nina Fan MD    Subjective:     Principal Problem:Acute metabolic encephalopathy    HPI:  No notes on file    Hospital Course:  Admitted on 8/18/2017 for acute metabolic encephalopathy found to be in DKA and started on DKA protocol with insulin infusion. Endocrine workup revealed a diagnosis of DM type I. DKA had resolved by second day of admission.   8/21: stepped down to hospital medicine, pt tolerating tube feed well and remains on insulin gtt, managed by endocrine    8/22: stable, able to respond appropritly, speech eval and thearpy, glucose is acceptable, resume thyroid supplement.    Interval History:NEON    Review of Systems   Unable to perform ROS: Other     Objective:     Vital Signs (Most Recent):  Temp: 98.3 °F (36.8 °C) (08/22/17 1528)  Pulse: 70 (08/22/17 1528)  Resp: 18 (08/22/17 0731)  BP: 134/62 (08/22/17 1528)  SpO2: 95 % (08/22/17 1528) Vital Signs (24h Range):  Temp:  [98.2 °F (36.8 °C)-98.9 °F (37.2 °C)] 98.3 °F (36.8 °C)  Pulse:  [66-77] 70  Resp:  [18] 18  SpO2:  [93 %-99 %] 95 %  BP: (131-175)/(58-79) 134/62     Weight: 66.4 kg (146 lb 6.2 oz)  Body mass index is 22.93 kg/m².    Intake/Output Summary (Last 24 hours) at 08/22/17 1608  Last data filed at 08/22/17 1200   Gross per 24 hour   Intake          2172.11 ml   Output              325 ml   Net          1847.11 ml      Physical Exam   Constitutional: He is oriented to person, place, and time. He appears well-developed.   HENT:   Head: Normocephalic.   Cardiovascular: Normal rate, regular rhythm and normal heart sounds.    No murmur heard.  Pulmonary/Chest: Effort normal and breath sounds normal.   Abdominal:  Soft. Bowel sounds are normal. He exhibits no distension. There is no tenderness.   Has a PEG tube   Neurological: He is alert and oriented to person, place, and time.       Significant Labs:   CMP:   Recent Labs  Lab 08/22/17  0057 08/22/17  0345 08/22/17  0753    139 139   K 3.8 3.8 3.7    108 107   CO2 25 23 27   * 180* 180*   BUN 22 22 22   CREATININE 1.8* 1.8* 1.7*   CALCIUM 8.4* 8.5* 8.5*   ANIONGAP 5* 8 5*   EGFRNONAA 38.1* 38.1* 40.8*       Significant Imaging: I have reviewed and interpreted all pertinent imaging results/findings within the past 24 hours.    Assessment/Plan:      On tube feeding diet              Uncontrolled type 1 diabetes mellitus with hyperglycemia              Alcohol intake above recommended sensible limits              Elevated troponin              Chronic combined systolic and diastolic heart failure              CKD (chronic kidney disease) stage 3, GFR 30-59 ml/min    EF per report 45% with diastolic dysfunction  -On lipitor  -On beta blocker  -Not on ACE/ARB, possibly 2/2 EILEEN vs CKD, will resume when at baseline        CAD (coronary artery disease)              Diabetic ketoacidosis    Type II, seen in clinic in 2016 with dx type 2; however, unable to obtain collateral and possibly Type I, per family on insulin gtt till CVA in 05/2017 and since on basal bolus per wife  -Would suspect chronic diagnosis given AKA, CAD, possible CKD  -C-peptide undetectable with BS in 400s; however, may be due to stunning and needs repeat after DKA resolved  - KATELIN Ab is normal  -Anion gap closed, corrected anion gap borderline at 17 this AM but remains acidotic, would consider additional concurrent non-gap acidosis (EILEEN vs RTA4)  -Tolerating tube feeds at goal, keep on continuous feedings at goal today if tolerated          EILEEN (acute kidney injury)    - trending down to 1.7  - undetermined baseline at this point.          Anemia    -relatively stable  -continue to monitor         "  * Acute metabolic encephalopathy    -Encephalopathy seems far out of proportion from DKA alone particularly if reported as "mild" on initial OSH presentation; however, do not have labwork to confirm  -TSH nl  -Cortisol ok  -neurology is following, EEG showed no seizure activity only mild encephalopaty pic  - Improved but not to baseline            VTE Risk Mitigation         Ordered     heparin (porcine) injection 5,000 Units  Every 8 hours     Route:  Subcutaneous        08/18/17 2315     High Risk of VTE  Once      08/18/17 2308     Place sequential compression device  Until discontinued      08/18/17 2308              [facility] Brodie Fan MD  Department of Hospital Medicine   [facility]  "

## 2017-08-22 NOTE — PLAN OF CARE
Problem: SLP Goal  Goal: SLP Goal  Speech Therapy Short Term Goals  Goals expected to be met by 8/29  1. Pt will tolerate puree diet and thin liquids with no overt s/s of aspiration noted.   2. Pt will tolerate dental soft trials x5+ with no overt s/s of aspiration and adequate oral clearance.   3. Pt will participate in a speech, language, and cognitive evaluation with possible updated goals to follow pending results.    Outcome: Ongoing (interventions implemented as appropriate)  Bedside swallow study completed. Pt with oral dysphagia c/b bolus holding and slow oral transit. Recommend: Puree diet, thin liquids, crushed po medications, small bites/sips, check for oral residue/pocketing, slow rate. ST will continue to follow. Pt would benefit from a Vgbegt-Ialzinfw-Ppigduodj Evaluation 2/2 family reporting pt not at baseline and noted impaired ability to follow directions, tangential speech, and evident confusion.   RASHID Lr., CCC-SLP  Pager: [phone]  08/22/2017

## 2017-08-23 LAB
ANION GAP SERPL CALC-SCNC: 5 MMOL/L
BASOPHILS # BLD AUTO: 0.03 K/UL
BASOPHILS NFR BLD: 0.3 %
BUN SERPL-MCNC: 23 MG/DL
C PEPTIDE SERPL-MCNC: <0.08 NG/ML
CALCIUM SERPL-MCNC: 8.3 MG/DL
CHLORIDE SERPL-SCNC: 104 MMOL/L
CO2 SERPL-SCNC: 27 MMOL/L
CREAT SERPL-MCNC: 1.7 MG/DL
DIFFERENTIAL METHOD: ABNORMAL
EOSINOPHIL # BLD AUTO: 0.4 K/UL
EOSINOPHIL NFR BLD: 3.7 %
ERYTHROCYTE [DISTWIDTH] IN BLOOD BY AUTOMATED COUNT: 14 %
EST. GFR  (AFRICAN AMERICAN): 47.2 ML/MIN/1.73 M^2
EST. GFR  (NON AFRICAN AMERICAN): 40.8 ML/MIN/1.73 M^2
GLUCOSE SERPL-MCNC: 214 MG/DL
HCT VFR BLD AUTO: 29.8 %
HGB BLD-MCNC: 10.3 G/DL
LYMPHOCYTES # BLD AUTO: 1.5 K/UL
LYMPHOCYTES NFR BLD: 14.4 %
MCH RBC QN AUTO: 28.6 PG
MCHC RBC AUTO-ENTMCNC: 34.6 G/DL
MCV RBC AUTO: 83 FL
MONOCYTES # BLD AUTO: 0.9 K/UL
MONOCYTES NFR BLD: 8.3 %
NEUTROPHILS # BLD AUTO: 7.6 K/UL
NEUTROPHILS NFR BLD: 72.6 %
PLATELET # BLD AUTO: 241 K/UL
PMV BLD AUTO: 10.6 FL
POCT GLUCOSE: 173 MG/DL (ref 70–110)
POCT GLUCOSE: 183 MG/DL (ref 70–110)
POCT GLUCOSE: 198 MG/DL (ref 70–110)
POCT GLUCOSE: 214 MG/DL (ref 70–110)
POCT GLUCOSE: 217 MG/DL (ref 70–110)
POCT GLUCOSE: 252 MG/DL (ref 70–110)
POCT GLUCOSE: 266 MG/DL (ref 70–110)
POCT GLUCOSE: 279 MG/DL (ref 70–110)
POTASSIUM SERPL-SCNC: 3.6 MMOL/L
RBC # BLD AUTO: 3.6 M/UL
SODIUM SERPL-SCNC: 136 MMOL/L
VIT B1 SERPL-MCNC: 156 UG/L (ref 38–122)
WBC # BLD AUTO: 10.44 K/UL

## 2017-08-23 PROCEDURE — 25000003 PHARM REV CODE 250: Performed by: STUDENT IN AN ORGANIZED HEALTH CARE EDUCATION/TRAINING PROGRAM

## 2017-08-23 PROCEDURE — G8979 MOBILITY GOAL STATUS: HCPCS | Mod: CM

## 2017-08-23 PROCEDURE — 97530 THERAPEUTIC ACTIVITIES: CPT

## 2017-08-23 PROCEDURE — 51798 US URINE CAPACITY MEASURE: CPT

## 2017-08-23 PROCEDURE — 85025 COMPLETE CBC W/AUTO DIFF WBC: CPT

## 2017-08-23 PROCEDURE — 63600175 PHARM REV CODE 636 W HCPCS: Performed by: INTERNAL MEDICINE

## 2017-08-23 PROCEDURE — G8980 MOBILITY D/C STATUS: HCPCS | Mod: CM

## 2017-08-23 PROCEDURE — 92523 SPEECH SOUND LANG COMPREHEN: CPT

## 2017-08-23 PROCEDURE — 63600175 PHARM REV CODE 636 W HCPCS: Performed by: HOSPITALIST

## 2017-08-23 PROCEDURE — 20600001 HC STEP DOWN PRIVATE ROOM

## 2017-08-23 PROCEDURE — 36415 COLL VENOUS BLD VENIPUNCTURE: CPT

## 2017-08-23 PROCEDURE — A4216 STERILE WATER/SALINE, 10 ML: HCPCS | Performed by: INTERNAL MEDICINE

## 2017-08-23 PROCEDURE — 25000003 PHARM REV CODE 250: Performed by: INTERNAL MEDICINE

## 2017-08-23 PROCEDURE — 84681 ASSAY OF C-PEPTIDE: CPT

## 2017-08-23 PROCEDURE — 97803 MED NUTRITION INDIV SUBSEQ: CPT

## 2017-08-23 PROCEDURE — 99232 SBSQ HOSP IP/OBS MODERATE 35: CPT | Mod: ,,, | Performed by: INTERNAL MEDICINE

## 2017-08-23 PROCEDURE — 97535 SELF CARE MNGMENT TRAINING: CPT

## 2017-08-23 PROCEDURE — 80048 BASIC METABOLIC PNL TOTAL CA: CPT

## 2017-08-23 RX ORDER — INSULIN ASPART 100 [IU]/ML
5 INJECTION, SOLUTION INTRAVENOUS; SUBCUTANEOUS EVERY 4 HOURS
Status: DISCONTINUED | OUTPATIENT
Start: 2017-08-24 | End: 2017-08-24

## 2017-08-23 RX ORDER — IBUPROFEN 200 MG
16 TABLET ORAL
Status: DISCONTINUED | OUTPATIENT
Start: 2017-08-23 | End: 2017-08-25 | Stop reason: HOSPADM

## 2017-08-23 RX ORDER — INSULIN ASPART 100 [IU]/ML
0-5 INJECTION, SOLUTION INTRAVENOUS; SUBCUTANEOUS
Status: DISCONTINUED | OUTPATIENT
Start: 2017-08-23 | End: 2017-08-25 | Stop reason: HOSPADM

## 2017-08-23 RX ORDER — IBUPROFEN 200 MG
24 TABLET ORAL
Status: DISCONTINUED | OUTPATIENT
Start: 2017-08-23 | End: 2017-08-25 | Stop reason: HOSPADM

## 2017-08-23 RX ORDER — GLUCAGON 1 MG
1 KIT INJECTION
Status: DISCONTINUED | OUTPATIENT
Start: 2017-08-23 | End: 2017-08-25 | Stop reason: HOSPADM

## 2017-08-23 RX ORDER — INSULIN ASPART 100 [IU]/ML
5 INJECTION, SOLUTION INTRAVENOUS; SUBCUTANEOUS EVERY 4 HOURS
Status: DISCONTINUED | OUTPATIENT
Start: 2017-08-23 | End: 2017-08-23

## 2017-08-23 RX ORDER — LEVOTHYROXINE SODIUM 25 UG/1
25 TABLET ORAL
Status: DISCONTINUED | OUTPATIENT
Start: 2017-08-24 | End: 2017-08-25 | Stop reason: HOSPADM

## 2017-08-23 RX ADMIN — INSULIN ASPART 3 UNITS: 100 INJECTION, SOLUTION INTRAVENOUS; SUBCUTANEOUS at 08:08

## 2017-08-23 RX ADMIN — SODIUM CHLORIDE 1.6 UNITS/HR: 9 INJECTION, SOLUTION INTRAVENOUS at 12:08

## 2017-08-23 RX ADMIN — INSULIN ASPART 3 UNITS: 100 INJECTION, SOLUTION INTRAVENOUS; SUBCUTANEOUS at 04:08

## 2017-08-23 RX ADMIN — Medication 3 ML: at 10:08

## 2017-08-23 RX ADMIN — Medication 100 MG: at 08:08

## 2017-08-23 RX ADMIN — METOPROLOL TARTRATE 50 MG: 25 TABLET ORAL at 08:08

## 2017-08-23 RX ADMIN — HEPARIN SODIUM 5000 UNITS: 5000 INJECTION, SOLUTION INTRAVENOUS; SUBCUTANEOUS at 01:08

## 2017-08-23 RX ADMIN — ATORVASTATIN CALCIUM 40 MG: 10 TABLET, FILM COATED ORAL at 08:08

## 2017-08-23 RX ADMIN — METOPROLOL TARTRATE 50 MG: 25 TABLET ORAL at 10:08

## 2017-08-23 RX ADMIN — HEPARIN SODIUM 5000 UNITS: 5000 INJECTION, SOLUTION INTRAVENOUS; SUBCUTANEOUS at 10:08

## 2017-08-23 RX ADMIN — INSULIN ASPART 3 UNITS: 100 INJECTION, SOLUTION INTRAVENOUS; SUBCUTANEOUS at 11:08

## 2017-08-23 RX ADMIN — ASPIRIN 81 MG CHEWABLE TABLET 81 MG: 81 TABLET CHEWABLE at 08:08

## 2017-08-23 RX ADMIN — HEPARIN SODIUM 5000 UNITS: 5000 INJECTION, SOLUTION INTRAVENOUS; SUBCUTANEOUS at 06:08

## 2017-08-23 RX ADMIN — INSULIN DETEMIR 8 UNITS: 100 INJECTION, SOLUTION SUBCUTANEOUS at 08:08

## 2017-08-23 RX ADMIN — INSULIN ASPART 2 UNITS: 100 INJECTION, SOLUTION INTRAVENOUS; SUBCUTANEOUS at 12:08

## 2017-08-23 RX ADMIN — CLOPIDOGREL 75 MG: 75 TABLET, FILM COATED ORAL at 08:08

## 2017-08-23 NOTE — PLAN OF CARE
Problem: Physical Therapy Goal  Goal: Physical Therapy Goal  Goals to be met by: 17     Patient will increase functional independence with mobility by performin. Supine to sit with Maximum Assistance  2. Sit to supine with Maximum Assistance  3. Sitting at edge of bed x10 minutes with Maximum Assistance and ability to initiate repositioning to midline.   4. Lower extremity exercise program x10 reps with assistance as needed to improve ROM, flexibility, and strength with functional mobility.     Goals remain appropriate at time. Continue with PT POC as indicated.

## 2017-08-23 NOTE — PLAN OF CARE
KYLE called [facility] in [place] and [facility] in [place] to confirm fax and SNF vacancies.  At [facility], I spoke with Nan in admissions and she stated that she would receive the fax and F/U with me after lunch. I explained to both facilities that we were looking at a potential d/c date of 8/25. Both referrals sent through [facility].      Nicolasa Norman, TEVIN  Ext [phone]

## 2017-08-23 NOTE — PT/OT/SLP PROGRESS
Physical Therapy  Treatment    Brandon Simmons   MRN: [id number]   Admitting Diagnosis: Acute metabolic encephalopathy    PT Received On: 08/23/17  PT Start Time: 1343     PT Stop Time: 1406    PT Total Time (min): 23 min       Billable Minutes:  Therapeutic Activity 23    Treatment Type: Treatment  PT/PTA: PTA     PTA Visit Number: 1       General Precautions: Standard, aspiration, fall, pureed diet  Orthopedic Precautions: N/A   Braces: N/A    Do you have any cultural, spiritual, Sikh conflicts, given your current situation?: none stated    Subjective:  Communicated with nursing prior to session.  Pt agreed to work with therapy.     Pain/Comfort  Pain Rating 1: 0/10  Pain Rating Post-Intervention 1: 0/10 (Pt w/o reports of pain.)    Objective:   Patient found with: bed alarm, pressure relief boots (all lines intact)    Functional Mobility:  Bed Mobility:   Scooting/Bridging: Total Assistance  Supine to Sit: Total Assistance, With assist of 2  Sit to Supine: Total Assistance    Balance:   Static Sit: 0: Needs MAXIMAL assist to maintain sitting without back support; pt tolerated sitting EOB ~12 min w/ Max A. Max verbal/tactile cueing for upright posture and for pt to look up.     Therapeutic Activities and Exercises:  Seated/Supine therex 2x10 reps w/ assistance:   -AP (LLE only)    -LAQ (LLE only)    -Hip Flexion (LLE only)    -Hip Abd/ADD   -QS   -GS   -Heel Slides (LLE only)      AM-PAC 6 CLICK MOBILITY  How much help from another person does this patient currently need?   1 = Unable, Total/Dependent Assistance  2 = A lot, Maximum/Moderate Assistance  3 = A little, Minimum/Contact Guard/Supervision  4 = None, Modified [place]/Independent    Turning over in bed (including adjusting bedclothes, sheets and blankets)?: 1  Sitting down on and standing up from a chair with arms (e.g., wheelchair, bedside commode, etc.): 1  Moving from lying on back to sitting on the side of the bed?: 2  Moving to and from a bed to  a chair (including a wheelchair)?: 1  Need to walk in hospital room?: 1  Climbing 3-5 steps with a railing?: 1  Total Score: 7    AM-PAC Raw Score CMS G-Code Modifier Level of Impairment Assistance   6 % Total / Unable   7 - 9 CM 80 - 100% Maximal Assist   10 - 14 CL 60 - 80% Moderate Assist   15 - 19 CK 40 - 60% Moderate Assist   20 - 22 CJ 20 - 40% Minimal Assist   23 CI 1-20% SBA / CGA   24 CH 0% Independent/ Mod I     Patient left supine with all lines intact, call button in reach, bed alarm on and family member present.    Assessment:  Brandon Jinsin is a 67 y.o. male with a medical diagnosis of Acute metabolic encephalopathy and presents with all deficits noted below. Pt continues to require total assistance with bed mobility. Pt tolerated sitting EOB ~12 min with Max A. Pt would continue to benefit from PT intervention at this time. Continue with PT POC as indicated.    Rehab identified problem list/impairments: Rehab identified problem list/impairments: weakness, impaired endurance, impaired self care skills, gait instability, impaired balance, impaired functional mobilty, visual deficits, decreased safety awareness, decreased lower extremity function, decreased upper extremity function, impaired cardiopulmonary response to activity, impaired skin    Rehab potential is good.    Activity tolerance: Good    Discharge recommendations: Discharge Facility/Level Of Care Needs: nursing facility, skilled     Barriers to discharge: Barriers to Discharge: None    Equipment recommendations: Equipment Needed After Discharge:  (TBD pending progress)     GOALS:    Physical Therapy Goals        Problem: Physical Therapy Goal    Goal Priority Disciplines Outcome Goal Variances Interventions   Physical Therapy Goal     PT/OT, PT      Description:  Goals to be met by: 17     Patient will increase functional independence with mobility by performin. Supine to sit with Maximum Assistance  2. Sit to supine with  Maximum Assistance  3. Sitting at edge of bed x10 minutes with Maximum Assistance and ability to initiate repositioning to midline.   4. Lower extremity exercise program x10 reps with assistance as needed to improve ROM, flexibility, and strength with functional mobility.                      PLAN:    Patient to be seen 4 x/week  to address the above listed problems via gait training, therapeutic activities, therapeutic exercises  Plan of Care expires: 09/20/17  Plan of Care reviewed with: patient         Yuliya Nica, PTA  08/23/2017

## 2017-08-23 NOTE — PT/OT/SLP PROGRESS
Occupational Therapy      Brandon GABRIEL Cousin  MRN: [id number]    Patient not seen today secondary to unable to arouse from sleep via multiple attempts with tactile/verbal stimulation. Will follow-up per schedule.    DEBBY Rodriguez  8/23/2017

## 2017-08-23 NOTE — ASSESSMENT & PLAN NOTE
- Delirium: could be due to DKA  -TSH nl  -Cortisol ok  - EEG showed no seizure activity only mild encephalopaty pic  - Improved but not to baseline

## 2017-08-23 NOTE — NURSING
Pt lethargic & minimally responsive, responds to combination of voice & touch. Oriented to self when aroused. VSS. BG re-checked - 182. IM5 notified, MD at bedside assessing pt.

## 2017-08-23 NOTE — SUBJECTIVE & OBJECTIVE
Interval History:NEON    Review of Systems   Unable to perform ROS: Dementia     Objective:     Vital Signs (Most Recent):  Temp: 98.6 °F (37 °C) (08/23/17 1132)  Pulse: 70 (08/23/17 1132)  Resp: 17 (08/23/17 0758)  BP: (!) 152/68 (08/23/17 1132)  SpO2: 95 % (08/23/17 1132) Vital Signs (24h Range):  Temp:  [97.7 °F (36.5 °C)-98.9 °F (37.2 °C)] 98.6 °F (37 °C)  Pulse:  [70-84] 70  Resp:  [17-18] 17  SpO2:  [90 %-95 %] 95 %  BP: (134-172)/(62-72) 152/68     Weight: 64 kg (141 lb 1.5 oz)  Body mass index is 22.1 kg/m².    Intake/Output Summary (Last 24 hours) at 08/23/17 1401  Last data filed at 08/23/17 1000   Gross per 24 hour   Intake          2151.92 ml   Output             2275 ml   Net          -123.08 ml      Physical Exam   Constitutional: He appears well-developed.   HENT:   Head: Normocephalic.   Cardiovascular: Normal rate, regular rhythm and normal heart sounds.    Pulmonary/Chest: Effort normal.   Abdominal: Soft. Bowel sounds are normal. There is no tenderness.   PEG tube, no erythema or discharge   Musculoskeletal: He exhibits no edema.   Right BKA, Left foot has pressure ulcer   Neurological: He is alert.   oriented to self   Skin: Skin is warm and dry.       Significant Labs:   BMP:   Recent Labs  Lab 08/23/17  0336   *      K 3.6      CO2 27   BUN 23   CREATININE 1.7*   CALCIUM 8.3*       Significant Imaging: I have reviewed and interpreted all pertinent imaging results/findings within the past 24 hours.

## 2017-08-23 NOTE — PLAN OF CARE
Problem: Patient Care Overview  Goal: Plan of Care Review  Outcome: Ongoing (interventions implemented as appropriate)  No acute events this shift. AVSS on RA. Pt calm & cooperative. Lethargic & arousing to voice/touch at beginning of shift (MD made aware, no new orders) but opening eyes spontaneously at end of shift. Nonverbal indicators of pain absent.     Problem: Diabetes, Type 1 (Adult)  Intervention: Optimize Glycemic Control  BG checked Q4hrs per orders, insulin gtt infusing at 1.9mL/hr. Will endorse instructions to night RN on discontinuation of gtt this evening. TF running at 50mL/hr - goal rate, no issues with tolerance. HOB > 30degrees.       Problem: Fall Risk (Adult)  Intervention: Safety Precautions  2 attempts to get OOB without assistance this shift. Bed alarm set, family remaining at bedside, hourly rounding completed & guard rails raised.       Problem: Pressure Ulcer Risk (Galen Scale) (Adult,Obstetrics,Pediatric)  Intervention: Prevent/Minimize Sheer/Friction Injuries  Unna boots on L heel - dressing CDI. Weight shift assistance provided Q2hrs with wedge support.

## 2017-08-23 NOTE — PROGRESS NOTES
[facility]  Adult Nutrition  Consult Note    SUMMARY     Recommendations    1. Increase current TF rate (of Diabetisource) to 60 mL/hr to meet 100% pt's estimated needs.    = 1728 calories, 86 grams of protein, 1178 mL fluid.     - Hold for residuals >500 mL.   2. Continue pureed diet, per ST recommendations.   3. RD to monitor & follow-up.    Goals: Meet % EEN, EPN  Nutrition Goal Status: progressing towards goal  Communication of RD Recs: reviewed with RN     Reason for Assessment    Reason for Assessment: RD follow-up  Diagnosis: other (see comments) (Metabolic encephalopathy)  Relevent Medical History: DM, HTN, CHF, BKA   Interdisciplinary Rounds: did not attend     General Information Comments: Pt tolerating current TF regimen via PEG. Also, on pureed diet w/ poor PO intake.  Nutrition Discharge Planning: Adequate nutrition    Nutrition Prescription Ordered    Current Diet Order: NPO     Current Nutrition Support Formula Ordered: Diabetisource  Current Nutrition Support Rate Ordered: 50 (ml)  Current Nutrition Support Frequency Ordered: mL/hr    Evaluation of Received Nutrients/Fluid Intake    Enteral Calories (kcal): 1440  Enteral Protein (gm): 72  Enteral (Free Water) Fluid (mL): 982     Free Water Flush Fluid (mL): 1000     Energy Calories Required: not meeting needs  % Kcal Needs:  (87%)     Protein Required: meeting needs  % Protein Needs:  (94%)     Fluid Required: meeting needs     Tolerance: tolerating    Nutrition/Diet History    Patient Reported Diet/Restrictions/Preferences: other (see comments) (MICHELLE)     Factors Affecting Nutritional Intake: behavioral (mealtime), decreased appetite    Labs/Tests/Procedures/Meds    Pertinent Labs Reviewed: reviewed, pertinent  Pertinent Labs Comments: Creat 1.7, GFR 47.2, Gluc 214, A1C 10.1  Pertinent Medications Reviewed: reviewed, pertinent  Pertinent Medications Comments: Insulin, Statin    Physical Findings    Overall Physical  "Appearance: nourished  Tubes: gastrostomy tube  Skin: pressure ulcer(s)    Anthropometrics    Height: 5' 7" (170.2 cm)  Weight Method: Bed Scale  Weight: 64 kg (141 lb 1.5 oz)    Ideal Body Weight (IBW), Male: 148 lb  % Ideal Body Weight, Male (lb): 95.34 lb     BMI (Calculated): 22.1  BMI Grade: 18.5-24.9 - normal     Usual Body Weight (UBW), kg:  (MICHELLE)     Total Amputation %: 5.9  Amputee BMI (kg/m2): 23.11 kg/m2    Estimated/Assessed Needs    Weight Used For Calorie Calculations: 64 kg (141 lb 1.5 oz)      Energy Calorie Requirements (kcal): 1916-0627 kcal/d  Energy Need Method: Griggs-St Jeor     Weight Used For Protein Calculations: 64 kg (141 lb 1.5 oz)  Protein Requirements: 77-96 g/d     Fluid Need Method: RDA Method, other (see comments) (Per MD or 1 mL/kcal)     CHO Requirement: 50% total kcals     Assessment and Plan    Inadequate energy intake r/t inability to consume sufficient energy AEB NPO with no alternate means of nutrition.  Status: Resolved    Monitor and Evaluation    Food and Nutrient Intake: energy intake, food and beverage intake, enteral nutrition intake  Food and Nutrient Adminstration: diet order, enteral and parenteral nutrition administration     Physical Activity and Function: nutrition-related ADLs and IADLs  Anthropometric Measurements: weight change, weight, body mass index  Biochemical Data, Medical Tests and Procedures: lipid profile, inflammatory profile, glucose/endocrine profile, gastrointestinal profile, electrolyte and renal panel  Nutrition-Focused Physical Findings: overall appearance    Nutrition Risk    Level of Risk: other (see comments) (1x/week)    Nutrition Follow-Up    RD Follow-up?: Yes      "

## 2017-08-23 NOTE — NURSING
Pt continuing to attempt to get  OOB. Wife at bedside but not participating in keeping pt in bed or alerting RN. Bed alarm alerting RN. Charge RN Emilie notified of pt's increasing fall risk - sitter or camera requested for bedside. Bed alarm set.

## 2017-08-23 NOTE — PLAN OF CARE
KYLE spoke with Nery at [facility] and was told that they can most likely accept pt, but Nery is putting the referral before the DON at the facility and will F/U with me tomorrow, 8/24.  KYLE confirmed that D/C will be most likely Monday August 28.      Nicolasa Norman, [facility]  Ext [phone]

## 2017-08-23 NOTE — PROGRESS NOTES
"[facility]  Endocrinology  Progress Note    Admit Date: 8/18/2017     . Cousin is a 68 y/o PMH DM (unclear if Type I vs II), CHF (unk. EF), HTN, EtOH abuse, EILEEN vs CKD who presents as a transfer from [facility] after presenting with chief complaint of AMS. Currently encephalopathic and no family available for collateral history. Per transfer report presented 08/08/2017 with acute personality changes and confusion noticed by wife. Per their report was in "mild" ketoacidosis. Apparently blood glucose values had been extremely labile with patient fluctuating between hypoglycemia and hyperglycemia with minimal insulin so transferred for additional endocrinology evaluation. Unknown length of duration of DM diagnosis or home medications.     Per report mental status has been disorented x3, with agitation and combativeness requiring restraints. CT head at OSH notable for chronic ischemic changes but no acute abnormality. Evaluated by Neurology there who suspected multifactorial metabolic encephalopathy. Also treated for possible EtOH withdrawal.     Reason for Consult: Management of T2DM, Hyperglycemia     Surgical Procedure and Date: unknown    Diabetes diagnosis year: unknown    Home Diabetes Medications:  unknown    How often checking glucose at home? Unknown   BG readings on regimen: Unknown  Hypoglycemia on the regimen?  unknown  Missed doses on regimen? unknown    Diabetes Complications include:     Diabetic chronic kidney disease      and Amputation status    Complicating diabetes co morbidities:   CHF and CKD        Interval HPI:   Overnight events: Tolerating tube feeds, some continued confusion and agitation. No complaints this AM other than does not like having an IV. Improvement in confusion per wife  Eating:   TFs  Nausea: No  Hypoglycemia and intervention: No  Fever: No  TPN and/or TF: Yes  If yes, type of TF/TPN and rate: Full strength Diabetasource at goal 50cc/hr    BP (!) " "172/72 (BP Location: Right arm, Patient Position: Lying)   Pulse 84   Temp 97.7 °F (36.5 °C) (Axillary)   Resp 17   Ht 5' 7" (1.702 m)   Wt 64 kg (141 lb 1.5 oz)   SpO2 95%   BMI 22.10 kg/m²       Labs Reviewed and Include      Recent Labs  Lab 08/23/17  0336   *   CALCIUM 8.3*      K 3.6   CO2 27      BUN 23   CREATININE 1.7*     Lab Results   Component Value Date    WBC 10.44 08/23/2017    HGB 10.3 (L) 08/23/2017    HCT 29.8 (L) 08/23/2017    MCV 83 08/23/2017     08/23/2017       Recent Labs  Lab 08/19/17  0930   TSH 2.131     Lab Results   Component Value Date    HGBA1C 10.0 (H) 08/18/2017       Nutritional status:   Body mass index is 22.1 kg/m².  Lab Results   Component Value Date    ALBUMIN 1.9 (L) 08/19/2017    ALBUMIN 2.0 (L) 08/19/2017    ALBUMIN 2.0 (L) 08/18/2017     No results found for: PREALBUMIN    Estimated Creatinine Clearance: 38.2 mL/min (based on Cr of 1.7).    Accu-Checks  Recent Labs      08/22/17   0011  08/22/17   0415  08/22/17   0845  08/22/17   1205  08/22/17   1614  08/22/17   1942  08/23/17   0022  08/23/17   0447  08/23/17   0801  08/23/17   0954   POCTGLUCOSE  216*  200*  209*  178*  134*  203*  214*  266*  252*  183*       Current Medications and/or Treatments Impacting Glycemic Control  Immunotherapy:  Immunosuppressants     None        Steroids:   Hormones     None        Pressors:    Autonomic Drugs     None        Hyperglycemia/Diabetes Medications: Antihyperglycemics     Start     Stop Route Frequency Ordered    08/21/17 1408  insulin regular (Humulin R) 100 Units in sodium chloride 0.9% 100 mL infusion      -- IV Continuous 08/21/17 1408    08/21/17 1259  insulin aspart pen 0-5 Units      -- SubQ As needed (PRN) 08/21/17 1200          ASSESSMENT and PLAN    Uncontrolled type 1 diabetes mellitus with hyperglycemia, with long-term current use of insulin     -C-peptide undetectable with BS in 400s; however, may be due to stunning and needs repeat " after DKA resolved  -KATELIN negative  -Anion gap closed, acidosis resolved  -Tolerating tube feeds at goal, keep on continuous feedings at goal   -Increase transition gtt at 1.9u/hr  -Will likely transition to weight based dosing but will depend on diet and po intake, suspect basal insulin doses of approximately detemir 9-11u BID with prandial dosing dependent on feeding (pureed vs continuous vs bolus tube feedings)       * Acute metabolic encephalopathy     -Improving  -Workup per primary team  -TSH nl  -Cortisol ok       Chronic combined systolic and diastolic heart failure     -EF per report 45% with diastolic dysfunction  -On lipitor  -On beta blocker  -Not on ACE/ARB, possibly 2/2 EILEEN vs CKD but would benefit long term   -avoid hypoglycemia        CAD (coronary artery disease)     -Per primary  -On lipitor  Avoid hypoglycemia        Hypothyroidism     -On synthroid 25mcg qd at home  -TSH nl  -Unclear if truly needs long term but ok to continue          Essential hypertension     -Managed per primary  -Long term BP goal at least <140/<80       Ron Cabral IV, MD  Endocrinology  [facility],  have personally taken the history and examined the patient and agree with the resident's note as stated above.    Start mdi while here

## 2017-08-23 NOTE — SUBJECTIVE & OBJECTIVE
"Interval HPI:   Overnight events: Tolerating tube feeds, some continued confusion and agitation. No complaints this AM other than does not like having an IV. Improvement in confusion per wife  Eating:   TFs  Nausea: No  Hypoglycemia and intervention: No  Fever: No  TPN and/or TF: Yes  If yes, type of TF/TPN and rate: Full strength Diabetasource at goal 50cc/hr    BP (!) 172/72 (BP Location: Right arm, Patient Position: Lying)   Pulse 84   Temp 97.7 °F (36.5 °C) (Axillary)   Resp 17   Ht 5' 7" (1.702 m)   Wt 64 kg (141 lb 1.5 oz)   SpO2 95%   BMI 22.10 kg/m²     Labs Reviewed and Include      Recent Labs  Lab 08/23/17  0336   *   CALCIUM 8.3*      K 3.6   CO2 27      BUN 23   CREATININE 1.7*     Lab Results   Component Value Date    WBC 10.44 08/23/2017    HGB 10.3 (L) 08/23/2017    HCT 29.8 (L) 08/23/2017    MCV 83 08/23/2017     08/23/2017       Recent Labs  Lab 08/19/17  0930   TSH 2.131     Lab Results   Component Value Date    HGBA1C 10.0 (H) 08/18/2017       Nutritional status:   Body mass index is 22.1 kg/m².  Lab Results   Component Value Date    ALBUMIN 1.9 (L) 08/19/2017    ALBUMIN 2.0 (L) 08/19/2017    ALBUMIN 2.0 (L) 08/18/2017     No results found for: PREALBUMIN    Estimated Creatinine Clearance: 38.2 mL/min (based on Cr of 1.7).    Accu-Checks  Recent Labs      08/22/17   0011  08/22/17   0415  08/22/17   0845  08/22/17   1205  08/22/17   1614  08/22/17   1942  08/23/17   0022  08/23/17   0447  08/23/17   0801  08/23/17   0954   POCTGLUCOSE  216*  200*  209*  178*  134*  203*  214*  266*  252*  183*       Current Medications and/or Treatments Impacting Glycemic Control  Immunotherapy:  Immunosuppressants     None        Steroids:   Hormones     None        Pressors:    Autonomic Drugs     None        Hyperglycemia/Diabetes Medications: Antihyperglycemics     Start     Stop Route Frequency Ordered    08/21/17 1408  insulin regular (Humulin R) 100 Units in sodium chloride " 0.9% 100 mL infusion      -- IV Continuous 08/21/17 1408    08/21/17 1259  insulin aspart pen 0-5 Units      -- SubQ As needed (PRN) 08/21/17 1200

## 2017-08-23 NOTE — PLAN OF CARE
Pt spouse at bedside and pt not arousable to voice or continued shaking stimulus.  Pt continued on insulin drip as well as tube feeds via PEG.  Covering Nicolasa WRIGHT, following with SNF referrals once pt is medically stable for discharge.  CM will continue to follow with any needs.

## 2017-08-23 NOTE — PT/OT/SLP EVAL
"Speech Language Pathology Evaluation    Brandon Simmons   MRN: [id number]   Admitting Diagnosis: Acute metabolic encephalopathy    Diet recommendations: Solid Diet Level: Puree  Liquid Diet Level: Thin   -Aspiration Precautions: Crush po medications in puree bolus, small bites/sips, check for oral residue/pocketing, slow rate, assistance with meals.  -Continue to monitor for signs and symptoms of aspiration and discontinue oral feeding should you notice any of the following: watery eyes, reddened facial area, wet vocal quality, increased work of breathing, change in respiratory status, increased congestion, coughing, fever, etc.    SLP Treatment Date: 08/23/17  Speech Start Time: 1105     Speech Stop Time: 1130     Speech Total (min): 25 min       TREATMENT BILLABLE MINUTES:  Eval 17  and [facility]/Home Management Training 8    Diagnosis: Acute metabolic encephalopathy    Past Medical History:   Diagnosis Date    Anticoagulant long-term use     Arthritis     Blood transfusion     CHF (congestive heart failure)     Coronary artery disease     Diabetes mellitus     Hypertension     Renal disorder      Past Surgical History:   Procedure Laterality Date    CARDIAC SURGERY      COLONOSCOPY N/A 10/16/2016    Procedure: COLONOSCOPY;  Surgeon: Jamar White MD;  Location: [facility];  Service: Endoscopy;  Laterality: N/A;    CORONARY ARTERY BYPASS GRAFT  2000    LEG AMPUTATION THROUGH LOWER TIBIA AND FIBULA      right leg - prosthesis in place    stents  left leg       Has the patient been evaluated by SLP for swallowing? : Yes  Keep patient NPO?: No   General Precautions: Standard, aspiration, fall, pureed diet          Social Hx: Patient lives with wife.  Pt s/p stroke in May.  Wife reports persisting memory / cognitive difficulty since that time with more decline in recent days.    Subjective:  "Brandon Jinsin" (pt oriented to self only during orientation questions)    Pain/Comfort  Pain Rating 1: 0/10  Pain " Rating Post-Intervention 1: 0/10     Objective:        Oral Musculature Evaluation  Oral Musculature: WFL  Dentition: edentulous, uses dentures to eat  Mucosal Quality: adequate  Mandibular Strength and Mobility: WFL  Oral Labial Strength and Mobility: WFL  Lingual Strength and Mobility: WFL    Cognitive Status:  Behavioral Observations: alert, confused and distractible-  Memory and Orientation: Impaired.  Pt oriented to self only.  Unable to state month, year, place, situation.  External aids reviewed.  Recall of general information was poor.  Pt was able to immediately repeat 3/3 words and numbers.  He repeated 5/5 numbers and 2/5 words.    Attention: Impaired.  Delayed responses evident.  Redirection to task and repetition of stimulus items needed.   Problem Solving: Answers to basic hypothetical questions were on task, but incomplete.  Perseveration was noted.  Pt was unable to complete simple categorization tasks accurately.  Pragmatics: Flat affect.  Poor initiation.  Executive Function: insight/awareness, mental flexibility, organization and self-monitoring    Language:   Auditory Comprehension: Deficits noted.  Pt able to respond to simple y/n questions and follow simple commands with good ability in structured tasks given occasional repetition.  Mr. Cousin responded to more complex y/n questions with 60% accuracy.  He followed 2 step commands with 50% and was unable to follow a 3 step command.    Verbal Expression: Deficits noted.  Initiation of conversation including conveying basic wants/needs was reduced.  Pt named common objects with 40% ind'ly /60% given cues.      Motor Speech: No significant dysarthria noted.    Voice: Vocal quality/intensity were wfl.    Reading: Not yet tested.    Writing: Not yet tested.    Visual-Spatial: Not yet tested.    Additional Treatment:    Pt refused po trials despite several offerings/encouragement.  Untouched breakfast tray remains at bedside.  Pt with PEG tube to  supplement oral nutrition. Aspiration precautions were reviewed with pt's wife.  Emphasis was placed on upright positioning, small bites/sips, and monitoring for s/s of difficulty.    Education also provided regarding continued ST role, cog/linguistic assessment, goals/plan of care.  Pt's wife verbalized understanding and denied questions post session.   MD team present for portion of session.     FIM:  Social Interaction: 2 Maximal Direction--The patient interacts appropriately 25 to 49% of the time, but may beed restraint due to socially inappropriate behaviors.   Problem Solvin Maximal Direction--The patient solves routine problems 25 to 49% of the time.  The patient needs direction more than half the time to iniate, plan, or complete simple daily activities, and may need restraint for safety.   Comprehension: 3 Moderate Prompting--The patient understands directions and conversation about basid daily needs 50 to 74% of the time.   Expression: 2 Maximal Prompting--The patient expresses basic daily needs and ideas 25 to 49% of the time.  The patient uses only single words or gestures, and (s)he needs prompting more than half the time.   Memory: 2 Maximal Prompting--The patien recognizes and remembers 25 to 49% of the time, and needs prompting more than half the time.     Assessment:  Brandon GABRIEL Cousin is a 67 y.o. male with a SLP diagnosis of oral dysphagia and Cognitive-Linguistic Impairment.     Do you have any cultural, spiritual, Latter day conflicts, given your current situation?: none known    Discharge recommendations: Discharge Facility/Level Of Care Needs: nursing facility, skilled     Goals:    SLP Goals        Problem: SLP Goal    Goal Priority Disciplines Outcome   SLP Goal     SLP Ongoing (interventions implemented as appropriate)   Description:  Speech Therapy Short Term Goals  Goals expected to be met by   1. Pt will tolerate puree diet and thin liquids with no overt s/s of aspiration noted.   2.  Pt will tolerate dental soft trials x5+ with no overt s/s of aspiration and adequate oral clearance.   3. Pt will participate in a speech, language, and cognitive evaluation with possible updated goals to follow pending results.- met.  4.  Pt will complete basic word finding tasks with 70% accuracy given min cues, to improve expressive language skills.   5.  Pt will complete complex comprehension tasks with 70% accuracy given min cues, to improve receptive language skills.   6.  Pt will complete simple categorization tasks with 50% accuracy, given mod cues, to improve cognition.   7.  Further assess reading/writing/visual-spatial skills.                         Plan:   Patient to be seen Therapy Frequency: 5 x/week   Plan of Care expires: 09/20/17  Plan of Care reviewed with: patient, spouse  SLP Follow-up?: Yes              RERE Riggins, CCC-SLP  Speech Language Pathologist  [phone]  8/23/2017

## 2017-08-23 NOTE — HPI
"Mr. Cousin is a 66 y/o PMH DM (unclear if Type I vs II), CHF (unk. EF), HTN, EtOH abuse, EILEEN vs CKD who presents as a transfer from [facility] after presenting with chief complaint of AMS. Currently encephalopathic and no family available for collateral history. Per transfer report presented 08/08/2017 with acute personality changes and confusion noticed by wife. Per their report was in "mild" ketoacidosis. Apparently blood glucose values had been extremely labile with patient fluctuating between hypoglycemia and hyperglycemia with minimal insulin so transferred for additional endocrinology evaluation. Unknown length of duration of DM diagnosis or home medications.      Per report mental status has been disorented x3, with agitation and combativeness requiring restraints. CT head at OSH notable for chronic ischemic changes but no acute abnormality. Evaluated by Neurology there who suspected multifactorial metabolic encephalopathy. Also treated for possible EtOH withdrawal.   "

## 2017-08-23 NOTE — PLAN OF CARE
Problem: Patient Care Overview  Goal: Plan of Care Review  No acute changes overnight. Remains oriented to self only. Pt alert but minimal communication noted. Pt remained out of restraints through entire shift. Calm and cooperative through night. Continues on insulin gtt currently infusing at 1.6 ml/hr. SSI administered with CBG checks q 4 hrs. Pt denies pain when asked. No nonverbal signs of pain indicated. Voiding appropriately. Continues on TF at goal rate of 50 ml/hr. No residuals noted and tolerating well. 250 ml H2O bolus performed twice on shift. No other issues noted. Wife remains at bedside. Will continue to monitor.

## 2017-08-24 LAB
BACTERIA BLD CULT: NORMAL
BACTERIA BLD CULT: NORMAL
BASOPHILS # BLD AUTO: 0.05 K/UL
BASOPHILS NFR BLD: 0.5 %
DIFFERENTIAL METHOD: ABNORMAL
EOSINOPHIL # BLD AUTO: 0.4 K/UL
EOSINOPHIL NFR BLD: 4.1 %
ERYTHROCYTE [DISTWIDTH] IN BLOOD BY AUTOMATED COUNT: 13.9 %
HCT VFR BLD AUTO: 33.7 %
HGB BLD-MCNC: 11.5 G/DL
LYMPHOCYTES # BLD AUTO: 1.8 K/UL
LYMPHOCYTES NFR BLD: 18.9 %
MCH RBC QN AUTO: 28.5 PG
MCHC RBC AUTO-ENTMCNC: 34.1 G/DL
MCV RBC AUTO: 83 FL
MONOCYTES # BLD AUTO: 0.8 K/UL
MONOCYTES NFR BLD: 8.1 %
NEUTROPHILS # BLD AUTO: 6.5 K/UL
NEUTROPHILS NFR BLD: 67.8 %
PLATELET # BLD AUTO: 306 K/UL
PMV BLD AUTO: 11.3 FL
POCT GLUCOSE: 106 MG/DL (ref 70–110)
POCT GLUCOSE: 192 MG/DL (ref 70–110)
POCT GLUCOSE: 212 MG/DL (ref 70–110)
POCT GLUCOSE: 252 MG/DL (ref 70–110)
POCT GLUCOSE: 252 MG/DL (ref 70–110)
POCT GLUCOSE: 324 MG/DL (ref 70–110)
RBC # BLD AUTO: 4.04 M/UL
WBC # BLD AUTO: 9.52 K/UL

## 2017-08-24 PROCEDURE — 63600175 PHARM REV CODE 636 W HCPCS: Performed by: INTERNAL MEDICINE

## 2017-08-24 PROCEDURE — 85025 COMPLETE CBC W/AUTO DIFF WBC: CPT

## 2017-08-24 PROCEDURE — 25000003 PHARM REV CODE 250: Performed by: INTERNAL MEDICINE

## 2017-08-24 PROCEDURE — 99232 SBSQ HOSP IP/OBS MODERATE 35: CPT | Mod: ,,, | Performed by: INTERNAL MEDICINE

## 2017-08-24 PROCEDURE — 25000003 PHARM REV CODE 250: Performed by: HOSPITALIST

## 2017-08-24 PROCEDURE — 97110 THERAPEUTIC EXERCISES: CPT

## 2017-08-24 PROCEDURE — 63600175 PHARM REV CODE 636 W HCPCS: Performed by: HOSPITALIST

## 2017-08-24 PROCEDURE — 36415 COLL VENOUS BLD VENIPUNCTURE: CPT

## 2017-08-24 PROCEDURE — 25000003 PHARM REV CODE 250: Performed by: STUDENT IN AN ORGANIZED HEALTH CARE EDUCATION/TRAINING PROGRAM

## 2017-08-24 PROCEDURE — 97530 THERAPEUTIC ACTIVITIES: CPT

## 2017-08-24 PROCEDURE — 20600001 HC STEP DOWN PRIVATE ROOM

## 2017-08-24 PROCEDURE — A4216 STERILE WATER/SALINE, 10 ML: HCPCS | Performed by: INTERNAL MEDICINE

## 2017-08-24 RX ORDER — INSULIN ASPART 100 [IU]/ML
3 INJECTION, SOLUTION INTRAVENOUS; SUBCUTANEOUS EVERY 4 HOURS
Status: DISCONTINUED | OUTPATIENT
Start: 2017-08-24 | End: 2017-08-24

## 2017-08-24 RX ORDER — INSULIN ASPART 100 [IU]/ML
3 INJECTION, SOLUTION INTRAVENOUS; SUBCUTANEOUS EVERY 4 HOURS
Status: DISCONTINUED | OUTPATIENT
Start: 2017-08-24 | End: 2017-08-25 | Stop reason: HOSPADM

## 2017-08-24 RX ORDER — INSULIN ASPART 100 [IU]/ML
4 INJECTION, SOLUTION INTRAVENOUS; SUBCUTANEOUS EVERY 4 HOURS
Status: DISCONTINUED | OUTPATIENT
Start: 2017-08-24 | End: 2017-08-24

## 2017-08-24 RX ORDER — INSULIN ASPART 100 [IU]/ML
5 INJECTION, SOLUTION INTRAVENOUS; SUBCUTANEOUS EVERY 4 HOURS
Status: DISCONTINUED | OUTPATIENT
Start: 2017-08-24 | End: 2017-08-24

## 2017-08-24 RX ADMIN — INSULIN DETEMIR 8 UNITS: 100 INJECTION, SOLUTION SUBCUTANEOUS at 10:08

## 2017-08-24 RX ADMIN — LEVOTHYROXINE SODIUM 25 MCG: 25 TABLET ORAL at 06:08

## 2017-08-24 RX ADMIN — HEPARIN SODIUM 5000 UNITS: 5000 INJECTION, SOLUTION INTRAVENOUS; SUBCUTANEOUS at 10:08

## 2017-08-24 RX ADMIN — INSULIN ASPART 4 UNITS: 100 INJECTION, SOLUTION INTRAVENOUS; SUBCUTANEOUS at 03:08

## 2017-08-24 RX ADMIN — Medication 3 ML: at 03:08

## 2017-08-24 RX ADMIN — HEPARIN SODIUM 5000 UNITS: 5000 INJECTION, SOLUTION INTRAVENOUS; SUBCUTANEOUS at 06:08

## 2017-08-24 RX ADMIN — Medication 100 MG: at 09:08

## 2017-08-24 RX ADMIN — INSULIN ASPART 3 UNITS: 100 INJECTION, SOLUTION INTRAVENOUS; SUBCUTANEOUS at 06:08

## 2017-08-24 RX ADMIN — METOPROLOL TARTRATE 50 MG: 25 TABLET ORAL at 09:08

## 2017-08-24 RX ADMIN — HEPARIN SODIUM 5000 UNITS: 5000 INJECTION, SOLUTION INTRAVENOUS; SUBCUTANEOUS at 03:08

## 2017-08-24 RX ADMIN — ATORVASTATIN CALCIUM 40 MG: 10 TABLET, FILM COATED ORAL at 09:08

## 2017-08-24 RX ADMIN — METOPROLOL TARTRATE 50 MG: 25 TABLET ORAL at 10:08

## 2017-08-24 RX ADMIN — INSULIN ASPART 5 UNITS: 100 INJECTION, SOLUTION INTRAVENOUS; SUBCUTANEOUS at 01:08

## 2017-08-24 RX ADMIN — INSULIN ASPART 3 UNITS: 100 INJECTION, SOLUTION INTRAVENOUS; SUBCUTANEOUS at 10:08

## 2017-08-24 RX ADMIN — INSULIN DETEMIR 8 UNITS: 100 INJECTION, SOLUTION SUBCUTANEOUS at 09:08

## 2017-08-24 RX ADMIN — CLOPIDOGREL 75 MG: 75 TABLET, FILM COATED ORAL at 09:08

## 2017-08-24 RX ADMIN — INSULIN ASPART 3 UNITS: 100 INJECTION, SOLUTION INTRAVENOUS; SUBCUTANEOUS at 09:08

## 2017-08-24 RX ADMIN — ASPIRIN 81 MG CHEWABLE TABLET 81 MG: 81 TABLET CHEWABLE at 09:08

## 2017-08-24 RX ADMIN — Medication 3 ML: at 10:08

## 2017-08-24 RX ADMIN — INSULIN ASPART 3 UNITS: 100 INJECTION, SOLUTION INTRAVENOUS; SUBCUTANEOUS at 02:08

## 2017-08-24 RX ADMIN — INSULIN ASPART 1 UNITS: 100 INJECTION, SOLUTION INTRAVENOUS; SUBCUTANEOUS at 10:08

## 2017-08-24 NOTE — PLAN OF CARE
Pt lethargic and non-coversant unarousable to tactile stimulation today with spouse at bedside.  SNF and Rehab referrals pending per covering Nicolasa WRIGHT, cornelius.  Pt not medically stable for discharge today.  CM will continue to follow with any needs.     08/24/17 1105   Right Care Assessment   Can the patient answer the patient profile reliably? No, cognitively impaired   How often would a person be available to care for the patient? Whenever needed   Describe the patient's ability to walk at the present time. Does not walk or unable to take any steps at all   How does the patient rate their overall health at the present time? Good   Number of comorbid conditions (as recorded on the chart) Three   During the past month, has the patient often been bothered by feeling down, depressed or hopeless? (unable to assess)   During the past month, has the patient often been bothered by little interest or pleasure in doing things? (unable to assess)     Plan A: SNF  Plan B: Rehab

## 2017-08-24 NOTE — NURSING
Spoke with on call team regarding novolog insulin orders.  Orders were to initiate novolog and give q 4 starting at 0015. Novolog was also due at 0200 and 0600.  MD said to initiate insulin at 0200 and continue at 0600 and skip 0015 dose.  Accucheck orders remain AC/HS.  No order parameters remain for scheduled novolog.  Will continue to monitor.

## 2017-08-24 NOTE — PT/OT/SLP PROGRESS
Speech Language Pathology      Brandon Simmons  MRN: [id number]   1066/[id number] A      Patient not seen today secondary to pt unarousable despite max auditory and tactile cues. RN present in room taking pt vitals. Will follow-up as able or next treatment date.    RERE Sutherland, CCC-SLP   Pager: [phone]  08/24/2017

## 2017-08-24 NOTE — ASSESSMENT & PLAN NOTE
- Delirium: could be due to DKA  -TSH nl  -Cortisol ok  - EEG showed no seizure activity only mild encephalopaty pic  - Improved , still refusing to eat, but pt is alert and responding to questions, able to say his name.

## 2017-08-24 NOTE — ASSESSMENT & PLAN NOTE
Insulinopenic, treat as type 1. KATELIN negative.   Plan to repeat c-peptide since it was checked during DKA/glucose toxicity.     Continue levemir 8u bid, decrease scheduled novolog 3 units q4hr, low correction, q4hr checks.     Dispo: being discharged to SNF on continuous TF so anticipating above regimen of levemir and scheduled novolog q4hr. If facility cannot do q4hr novolog, will substitute novolog with regular insulin q6h instead.

## 2017-08-24 NOTE — PROGRESS NOTES
"[facility] Hospital Medicine  Progress Note    Patient Name: Brandon Simmons  MRN: [id number]  Patient Class: IP- Inpatient   Admission Date: 8/18/2017  Length of Stay: 5 days  Attending Physician: Josefina Douglass MD  Primary Care Provider: Neville Willis MD    Hospital Medicine Team: [facility] Brodie Fan MD    Subjective:     Principal Problem:Acute metabolic encephalopathy    HPI:  Mr. Simmons is a 66 y/o PMH DM (unclear if Type I vs II), CHF (unk. EF), HTN, EtOH abuse, EILEEN vs CKD who presents as a transfer from [facility] after presenting with chief complaint of AMS. Currently encephalopathic and no family available for collateral history. Per transfer report presented 08/08/2017 with acute personality changes and confusion noticed by wife. Per their report was in "mild" ketoacidosis. Apparently blood glucose values had been extremely labile with patient fluctuating between hypoglycemia and hyperglycemia with minimal insulin so transferred for additional endocrinology evaluation. Unknown length of duration of DM diagnosis or home medications.      Per report mental status has been disorented x3, with agitation and combativeness requiring restraints. CT head at OSH notable for chronic ischemic changes but no acute abnormality. Evaluated by Neurology there who suspected multifactorial metabolic encephalopathy. Also treated for possible EtOH withdrawal.     Hospital Course:  Admitted on 8/18/2017 for acute metabolic encephalopathy found to be in DKA and started on DKA protocol with insulin infusion. Endocrine workup revealed a diagnosis of DM type I. DKA had resolved by second day of admission.   8/21: stepped down to hospital medicine, pt tolerating tube feed well and remains on insulin gtt, managed by endocrine   8/22: stable, able to respond appropritly, speech eval and thearpy, glucose is acceptable, resume thyroid supplement.  8/23: stable, still on continues feed and " changed to SC insulin by Endocrinology, and  Followed by speech thearpy    Interval History:NEON    Review of Systems   Unable to perform ROS: Dementia     Objective:     Vital Signs (Most Recent):  Temp: 98.6 °F (37 °C) (08/23/17 1132)  Pulse: 70 (08/23/17 1132)  Resp: 17 (08/23/17 0758)  BP: (!) 152/68 (08/23/17 1132)  SpO2: 95 % (08/23/17 1132) Vital Signs (24h Range):  Temp:  [97.7 °F (36.5 °C)-98.9 °F (37.2 °C)] 98.6 °F (37 °C)  Pulse:  [70-84] 70  Resp:  [17-18] 17  SpO2:  [90 %-95 %] 95 %  BP: (134-172)/(62-72) 152/68     Weight: 64 kg (141 lb 1.5 oz)  Body mass index is 22.1 kg/m².    Intake/Output Summary (Last 24 hours) at 08/23/17 1401  Last data filed at 08/23/17 1000   Gross per 24 hour   Intake          2151.92 ml   Output             2275 ml   Net          -123.08 ml      Physical Exam   Constitutional: He appears well-developed.   HENT:   Head: Normocephalic.   Cardiovascular: Normal rate, regular rhythm and normal heart sounds.    Pulmonary/Chest: Effort normal.   Abdominal: Soft. Bowel sounds are normal. There is no tenderness.   PEG tube, no erythema or discharge   Musculoskeletal: He exhibits no edema.   Right BKA, Left foot has pressure ulcer   Neurological: He is alert.   oriented to self   Skin: Skin is warm and dry.       Significant Labs:   BMP:   Recent Labs  Lab 08/23/17  0336   *      K 3.6      CO2 27   BUN 23   CREATININE 1.7*   CALCIUM 8.3*       Significant Imaging: I have reviewed and interpreted all pertinent imaging results/findings within the past 24 hours.    Assessment/Plan:      Uncontrolled type 1 diabetes mellitus with hyperglycemia    - F/U endocrinology consult  - On continues PEG feeding          Diabetic ketoacidosis    Type II, seen in clinic in 2016 with dx type 2; however, unable to obtain collateral and possibly Type I, per family on insulin gtt till CVA in 05/2017 and since on basal bolus per wife  -Would suspect chronic diagnosis given AKA, CAD,  possible CKD  -C-peptide undetectable with BS in 400s; however, may be due to stunning and needs repeat after DKA resolved  - KATELIN Ab is normal  -Anion gap closed, corrected anion gap borderline at 17 this AM but remains acidotic, would consider additional concurrent non-gap acidosis (EILEEN vs RTA4)  -Tolerating tube feeds at goal, keep on continuous feedings at goal today if tolerated          * Acute metabolic encephalopathy    - Delirium: could be due to DKA  -TSH nl  -Cortisol ok  - EEG showed no seizure activity only mild encephalopaty pic  - Improved but not to baseline          High anion gap metabolic acidosis    - Resolved          EILEEN (acute kidney injury)    - trending down to 1.7  - baseline 1.5          On tube feeding diet              Alcohol intake above recommended sensible limits              Elevated troponin              Chronic combined systolic and diastolic heart failure              CKD (chronic kidney disease) stage 3, GFR 30-59 ml/min    EF per report 45% with diastolic dysfunction  -On lipitor  -On beta blocker  -Not on ACE/ARB, possibly 2/2 EILEEN vs CKD, will resume when at baseline        CAD (coronary artery disease)              Anemia    -relatively stable  -continue to monitor            VTE Risk Mitigation         Ordered     heparin (porcine) injection 5,000 Units  Every 8 hours     Route:  Subcutaneous        08/18/17 2315     High Risk of VTE  Once      08/18/17 2308     Place sequential compression device  Until discontinued      08/18/17 2308              Nina Fan MD  Department of Hospital Medicine   [facility]

## 2017-08-24 NOTE — PLAN OF CARE
[facility]    FACILITY TRANSFER ORDERS      Patient Name: Brandon Simmons  YOB: 1950    PCP: Neville Willis MD   PCP Address: [address] [place] / [place] [address]  PCP Phone Number: [phone]  PCP Fax: [phone]    Encounter Date: 08/25/2017    Admit to: [facility]    Vital Signs:  Routine    Diagnoses:   Active Hospital Problems    Diagnosis  POA    *Acute metabolic encephalopathy [G93.41]  Yes     Priority: 1 - High    Diabetic ketoacidosis [E13.10]  Yes     Priority: 2     On tube feeding diet [Z78.9]  Yes    Suspected deep tissue injury [Z04.9]  Not Applicable    Uncontrolled type 1 diabetes mellitus with hyperglycemia [E10.65]  Yes    CAD (coronary artery disease) [I25.10]  Yes    CKD (chronic kidney disease) stage 3, GFR 30-59 ml/min [N18.3]  Yes    Essential hypertension [I10]  Yes    High anion gap metabolic acidosis [E87.2]  Yes    EILEEN (acute kidney injury) [N17.9]  Yes    PVD (peripheral vascular disease) [I73.9]  Yes      Resolved Hospital Problems    Diagnosis Date Resolved POA    Uncontrolled type 2 diabetes mellitus with ketoacidosis without coma, with long-term current use of insulin [E13.10, Z79.4] 08/20/2017 Not Applicable       Allergies:  Review of patient's allergies indicates:   Allergen Reactions    Budesonide Rash    Cobalt Rash       Diet: diabetic diet: 2000 calorie   -Aspiration Precautions: Crush po medications in puree bolus, small bites/sips, check for oral residue/pocketing, slow rate, assistance with meals.  -Continue to monitor for signs and symptoms of aspiration and discontinue oral feeding should you notice any of the following: watery eyes, reddened facial area, wet vocal quality, increased work of breathing, change in respiratory status, increased congestion, coughing, fever, etc.       Activities: Activity as tolerated    Nursing: tube feeding, wound care and medication      Labs: regular blood sugar check     CONSULTS:     Physical Therapy to evaluate and treat. , Occupational Therapy to evaluate and treat. and Speech Therapy to evaluate and treat for Swallowing and Cognition.    MISCELLANEOUS CARE:  PEG Care: Clean site every 24 hours. , Routine Skin for Bedridden Patients: Apply moisture barrier cream to all skin folds and wet areas in perineal area daily and after baths and all bowel movements. and Diabetes Care:   SN to perform and educate Diabetic management with blood glucose monitoring:, Fingerstick blood sugar every 6 hours if patient is unable to eat and Report CBG < 60 or > 350 to physician.    WOUND CARE ORDERS  Yes: Foot Ulcer:  Location: sole  Recommend mepilex borders to both areas to prevent additional skin breakdown  Dry or minimal exudate wound: Apply wound gel daily (supplied by [facility]), cover with telfa dressing, Dry Eschar: Pain with betadine twice daily. and Partial Eschar, dissolving tissue or with pink tissue: Collagenase (Santyl) daily, cover with telfa, wrap with with kerlix dressing        Medications: Review discharge medications with patient and family and provide education.    Current Discharge Medication List      START taking these medications    Details   aspirin 81 MG Chew Take 1 tablet (81 mg total) by mouth once daily.  Refills: 0      insulin detemir (LEVEMIR FLEXTOUCH) 100 unit/mL (3 mL) SubQ InPn pen Inject 8 Units into the skin 2 (two) times daily.  Qty: 3 mL, Refills: 3         CONTINUE these medications which have CHANGED    Details   amlodipine (NORVASC) 10 MG tablet Take 1 tablet (10 mg total) by mouth once daily.  Qty: 30 tablet, Refills: 3      furosemide (LASIX) 40 MG tablet Take 0.5 tablets (20 mg total) by mouth every other day.  Qty: 30 tablet, Refills: 3      NOVOLOG 100 unit/mL injection Inject 3 Units into the skin every 4 (four) hours. Per sliding scale  Qty: 10 mL, Refills: 3         CONTINUE these medications which have NOT CHANGED    Details   amitriptyline (ELAVIL) 25 MG tablet  Take 25 mg by mouth every evening.       atorvastatin (LIPITOR) 80 MG tablet Take 80 mg by mouth nightly.      clopidogrel (PLAVIX) 75 mg tablet Take 75 mg by mouth once daily.        CONTOUR NEXT STRIPS Strp test SIX TIMES DAILY  Refills: 0      isosorbide mononitrate (IMDUR) 30 MG 24 hr tablet Take 30 mg by mouth once daily.        levothyroxine (SYNTHROID) 25 MCG tablet Take 25 mcg by mouth once daily.      lisinopril (PRINIVIL,ZESTRIL) 20 MG tablet Take 1 tablet (20 mg total) by mouth once daily.      metoprolol tartrate (LOPRESSOR) 100 MG tablet Take 100 mg by mouth 2 (two) times daily.  Refills: 4      omeprazole (PRILOSEC) 20 MG capsule Take 20 mg by mouth once daily.       pyridoxine, vitamin B6, (VITAMIN B-6) 100 MG Tab Take 50 mg by mouth once daily.      vitamin D 1000 units Tab Take 185 mg by mouth once daily.         STOP taking these medications       aspirin 325 MG tablet Comments:   Reason for Stopping:         clonidine (CATAPRES) 0.1 MG tablet Comments:   Reason for Stopping:         gabapentin (NEURONTIN) 600 MG tablet Comments:   Reason for Stopping:         losartan (COZAAR) 100 MG tablet Comments:   Reason for Stopping:         hydrochlorothiazide (HYDRODIURIL) 25 MG tablet Comments:   Reason for Stopping:         lansoprazole (PREVACID) 30 MG capsule Comments:   Reason for Stopping:         metoprolol succinate (TOPROL-XL) 100 MG 24 hr tablet Comments:   Reason for Stopping:         pantoprazole (PROTONIX) 40 MG tablet Comments:   Reason for Stopping:         ranolazine (RANEXA) 500 MG Tb12 Comments:   Reason for Stopping:         simvastatin (ZOCOR) 20 MG tablet Comments:   Reason for Stopping:         sucralfate (CARAFATE) 1 gram tablet Comments:   Reason for Stopping:                    _________________________________  Raina Casillas MD  08/25/2017

## 2017-08-24 NOTE — SUBJECTIVE & OBJECTIVE
"Interval HPI:   Overnight events:  Remains on continuous TF 50cc/hr. BS improved, at or above goal. Not eating.    /61 (BP Location: Right arm, Patient Position: Lying)   Pulse 73   Temp 97.4 °F (36.3 °C) (Axillary)   Resp 16   Ht 5' 7" (1.702 m)   Wt 62 kg (136 lb 11 oz)   SpO2 (!) 93%   BMI 21.41 kg/m²     Labs Reviewed and Include    No results for input(s): GLU, CALCIUM, ALBUMIN, PROT, NA, K, CO2, CL, BUN, CREATININE, ALKPHOS, ALT, AST, BILITOT in the last 24 hours.  Lab Results   Component Value Date    WBC 9.52 08/24/2017    HGB 11.5 (L) 08/24/2017    HCT 33.7 (L) 08/24/2017    MCV 83 08/24/2017     08/24/2017       Recent Labs  Lab 08/19/17  0930   TSH 2.131     Lab Results   Component Value Date    HGBA1C 10.0 (H) 08/18/2017       Nutritional status:   Body mass index is 21.41 kg/m².  Lab Results   Component Value Date    ALBUMIN 1.9 (L) 08/19/2017    ALBUMIN 2.0 (L) 08/19/2017    ALBUMIN 2.0 (L) 08/18/2017     No results found for: PREALBUMIN    Estimated Creatinine Clearance: 37 mL/min (based on Cr of 1.7).    Accu-Checks  Recent Labs      08/23/17   0022  08/23/17   0447  08/23/17   0801  08/23/17   0954  08/23/17   1154  08/23/17   1515  08/23/17   1603  08/23/17   2022  08/24/17   0157  08/24/17   0632   POCTGLUCOSE  214*  266*  252*  183*  279*  217*  198*  173*  212*  106       Current Medications and/or Treatments Impacting Glycemic Control  Immunotherapy:  Immunosuppressants     None        Steroids:   Hormones     None        Pressors:    Autonomic Drugs     None        Hyperglycemia/Diabetes Medications: Antihyperglycemics     Start     Stop Route Frequency Ordered    08/24/17 1000  insulin aspart pen 4 Units      -- SubQ Every 4 hours 08/24/17 0825    08/23/17 2000  insulin detemir pen 8 Units      -- SubQ 2 times daily 08/23/17 1514    08/23/17 1713  insulin aspart pen 0-5 Units      -- SubQ Before meals & nightly PRN 08/23/17 1613        "

## 2017-08-24 NOTE — PLAN OF CARE
Problem: Occupational Therapy Goal  Goal: Occupational Therapy Goal  Goals to be met by: 8/28/17    Patient will increase functional independence with ADLs by performing:    UE Dressing with Moderate Assistance.   LE Dressing with Moderate Assistance. MET 8/24 but continue goal for donning underwear.  Grooming while EOB with Minimal Assistance. MET 8/24/17  REVISED to SBA.  Rolling to Bilateral with Minimal Assistance.   Supine to sit with Moderate Assistance. MET 8/24/17  REVISED to SBA.  Stand pivot transfers with Maximum Assistance.  Toilet transfer to bedside commode with Maximum Assistance.     Outcome: Ongoing (interventions implemented as appropriate)  Con't with POC.    DEBBY Rodriguez

## 2017-08-24 NOTE — ASSESSMENT & PLAN NOTE
- F/U endocrinology consult adjusted novolog does according to their recommandations  - On continues PEG feeding  - see DKA

## 2017-08-24 NOTE — PROGRESS NOTES
"[facility] Hospital Medicine  Progress Note    Patient Name: Brandon Simmons  MRN: [id number]  Patient Class: IP- Inpatient   Admission Date: 8/18/2017  Length of Stay: 6 days  Attending Physician: Josefina Douglass MD  Primary Care Provider: Neville Willis MD    Hospital Medicine Team: [facility] 5 [facility] Brodie Fan MD    Subjective:     Principal Problem:Acute metabolic encephalopathy    HPI:  Mr. Simmons is a 66 y/o PMH DM (unclear if Type I vs II), CHF (unk. EF), HTN, EtOH abuse, EILEEN vs CKD who presents as a transfer from [facility] after presenting with chief complaint of AMS. Currently encephalopathic and no family available for collateral history. Per transfer report presented 08/08/2017 with acute personality changes and confusion noticed by wife. Per their report was in "mild" ketoacidosis. Apparently blood glucose values had been extremely labile with patient fluctuating between hypoglycemia and hyperglycemia with minimal insulin so transferred for additional endocrinology evaluation. Unknown length of duration of DM diagnosis or home medications.      Per report mental status has been disorented x3, with agitation and combativeness requiring restraints. CT head at OSH notable for chronic ischemic changes but no acute abnormality. Evaluated by Neurology there who suspected multifactorial metabolic encephalopathy. Also treated for possible EtOH withdrawal.     Hospital Course:  Admitted on 8/18/2017 for acute metabolic encephalopathy found to be in DKA and started on DKA protocol with insulin infusion. Endocrine workup revealed a diagnosis of DM type I. DKA had resolved by second day of admission.   8/21: stepped down to hospital medicine, pt tolerating tube feed well and remains on insulin gtt, managed by endocrine   8/22: stable, able to respond appropritly, speech eval and thearpy, glucose is acceptable, resume thyroid supplement.  8/23: stable, still on continues feed and " insulin drip, followed by Endocrinology, and speech thearpy  8/24: stable, tolerating SC insulin, no hypoglycemic attacks    Interval History: NEON    Review of Systems   Unable to perform ROS: Dementia   Endocrine: Positive for heat intolerance.     Objective:     Vital Signs (Most Recent):  Temp: 98.4 °F (36.9 °C) (08/24/17 1213)  Pulse: 74 (08/24/17 1213)  Resp: 16 (08/24/17 1213)  BP: (!) 159/75 (08/24/17 1213)  SpO2: 96 % (08/24/17 1213) Vital Signs (24h Range):  Temp:  [97.4 °F (36.3 °C)-98.4 °F (36.9 °C)] 98.4 °F (36.9 °C)  Pulse:  [72-82] 74  Resp:  [15-16] 16  SpO2:  [92 %-96 %] 96 %  BP: (132-172)/(61-75) 159/75     Weight: 62 kg (136 lb 11 oz)  Body mass index is 21.41 kg/m².    Intake/Output Summary (Last 24 hours) at 08/24/17 1540  Last data filed at 08/24/17 0600   Gross per 24 hour   Intake           1051.9 ml   Output                0 ml   Net           1051.9 ml      Physical Exam   Constitutional: He appears well-developed.   HENT:   Head: Normocephalic.   Cardiovascular: Normal rate, regular rhythm and normal heart sounds.    No murmur heard.  Pulmonary/Chest: Effort normal and breath sounds normal.   Abdominal: Soft. Bowel sounds are normal. He exhibits no distension. There is no tenderness.       Musculoskeletal: Normal range of motion.   Neurological: He is alert.   Psychiatric: He has a normal mood and affect. His behavior is normal.       Significant Labs:   CBC:   Recent Labs  Lab 08/23/17  0336 08/24/17  0350   WBC 10.44 9.52   HGB 10.3* 11.5*   HCT 29.8* 33.7*    306     CMP:   Recent Labs  Lab 08/23/17  0336      K 3.6      CO2 27   *   BUN 23   CREATININE 1.7*   CALCIUM 8.3*   ANIONGAP 5*   EGFRNONAA 40.8*       Significant Imaging: I have reviewed and interpreted all pertinent imaging results/findings within the past 24 hours.    Assessment/Plan:      Uncontrolled type 1 diabetes mellitus with hyperglycemia    - F/U endocrinology consult adjusted novolog does  according to their recommandations  - On continues PEG feeding  - see DKA          Diabetic ketoacidosis    Type II, seen in clinic in 2016 with dx type 2; however, unable to obtain collateral and possibly Type I, per family on insulin gtt till CVA in 05/2017 and since on basal bolus per wife  - Would suspect chronic diagnosis given AKA, CAD, possible CKD  - C-peptide undetectable with BS in 400s; however, may be due to stunning and needs repeat after DKA resolved  - KATELIN Ab is normal  - Anion gap closed, corrected anion gap borderline at 17 this AM but remains acidotic, would consider additional concurrent non-gap acidosis (EILEEN vs RTA4)  - On continues PEG feed as the pt is refusing to eat.  - transitioned from insulin drip to SC insulin, tolerating well no episodes of hypo/hyperglycemia          * Acute metabolic encephalopathy    - Delirium: could be due to DKA  -TSH nl  -Cortisol ok  - EEG showed no seizure activity only mild encephalopaty pic  - Improved , still refusing to eat, but pt is alert and responding to questions, able to say his name.          High anion gap metabolic acidosis    - Resolved          EILEEN (acute kidney injury)    - trending down to 1.7  - baseline 1.5          Suspected deep tissue injury    - followed by wound care          On tube feeding diet    - continues feeding          History of Left-sided intracerebral hemorrhage    -           Alcohol intake above recommended sensible limits              Elevated troponin              Chronic combined systolic and diastolic heart failure              Essential hypertension    - resume metoprolol 50 mg bid  - controled        CKD (chronic kidney disease) stage 3, GFR 30-59 ml/min    EF per report 45% with diastolic dysfunction  -On lipitor  -On beta blocker  -Not on ACE/ARB, possibly 2/2 EILEEN vs CKD, will resume when at baseline        CAD (coronary artery disease)      - Resume atorvastatin 40 mg OD        PVD (peripheral vascular disease)     -Hx of right BKA  - left foot ulcer, followed by wound care          Anemia    -relatively stable  -continue to monitor            VTE Risk Mitigation         Ordered     heparin (porcine) injection 5,000 Units  Every 8 hours     Route:  Subcutaneous        08/18/17 2314     High Risk of VTE  Once      08/18/17 2308     Place sequential compression device  Until discontinued      08/18/17 2308              Nina Fan MD  Department of Hospital Medicine   [facility]

## 2017-08-24 NOTE — PLAN OF CARE
Problem: Patient Care Overview  Goal: Plan of Care Review  Outcome: Ongoing (interventions implemented as appropriate)  Patient oriented to self only. VSS. Patient calm. Family remains at bedside. Tube feeds at goal rate. Seen by Occupational therapy today. Patient remains free of falls.     Problem: Pressure Ulcer Risk (Galen Scale) (Adult,Obstetrics,Pediatric)  Intervention: Turn/Reposition Often  Patient repositioned throughout shift.

## 2017-08-24 NOTE — NURSING
Spoke to Dr. Wood regarding scheduled novolog due at 0600.  Bg was 106.  MD ordered to hold 0600 dose.  Will continue to monitor.

## 2017-08-24 NOTE — PLAN OF CARE
SW received call from Nan at [facility] in [place] ([phone] cell) and was told that they are declining the referral because they do not feel that they can meet his needs.    Referral also sent to [facility] ([phone], fax [phone]) at spouse's request.      Nicolasa [place], [facility]  Ext [phone]

## 2017-08-24 NOTE — PLAN OF CARE
KYLE spoke with Qiana at [facility] ([phone]) and gave her the nutritional information for pt's tube feeding.  Pt is currently receiving Diabetisource, and Qiana stated that the facility was able to provide this.  Qiana also stated that the facility has accepted pt for transfer tomorrow, 8/25/17.      Nicolasa [place], [facility]  Ext [phone]

## 2017-08-24 NOTE — ASSESSMENT & PLAN NOTE
Type II, seen in clinic in 2016 with dx type 2; however, unable to obtain collateral and possibly Type I, per family on insulin gtt till CVA in 05/2017 and since on basal bolus per wife  - Would suspect chronic diagnosis given AKA, CAD, possible CKD  - C-peptide undetectable with BS in 400s; however, may be due to stunning and needs repeat after DKA resolved  - KATELIN Ab is normal  - Anion gap closed, corrected anion gap borderline at 17 this AM but remains acidotic, would consider additional concurrent non-gap acidosis (EILEEN vs RTA4)  - On continues PEG feed as the pt is refusing to eat.  - transitioned from insulin drip to SC insulin, tolerating well no episodes of hypo/hyperglycemia

## 2017-08-24 NOTE — SUBJECTIVE & OBJECTIVE
Interval History: NEON    Review of Systems   Unable to perform ROS: Dementia   Endocrine: Positive for heat intolerance.     Objective:     Vital Signs (Most Recent):  Temp: 98.4 °F (36.9 °C) (08/24/17 1213)  Pulse: 74 (08/24/17 1213)  Resp: 16 (08/24/17 1213)  BP: (!) 159/75 (08/24/17 1213)  SpO2: 96 % (08/24/17 1213) Vital Signs (24h Range):  Temp:  [97.4 °F (36.3 °C)-98.4 °F (36.9 °C)] 98.4 °F (36.9 °C)  Pulse:  [72-82] 74  Resp:  [15-16] 16  SpO2:  [92 %-96 %] 96 %  BP: (132-172)/(61-75) 159/75     Weight: 62 kg (136 lb 11 oz)  Body mass index is 21.41 kg/m².    Intake/Output Summary (Last 24 hours) at 08/24/17 1540  Last data filed at 08/24/17 0600   Gross per 24 hour   Intake           1051.9 ml   Output                0 ml   Net           1051.9 ml      Physical Exam   Constitutional: He appears well-developed.   HENT:   Head: Normocephalic.   Cardiovascular: Normal rate, regular rhythm and normal heart sounds.    No murmur heard.  Pulmonary/Chest: Effort normal and breath sounds normal.   Abdominal: Soft. Bowel sounds are normal. He exhibits no distension. There is no tenderness.       Musculoskeletal: Normal range of motion.   Neurological: He is alert.   Psychiatric: He has a normal mood and affect. His behavior is normal.       Significant Labs:   CBC:   Recent Labs  Lab 08/23/17  0336 08/24/17  0350   WBC 10.44 9.52   HGB 10.3* 11.5*   HCT 29.8* 33.7*    306     CMP:   Recent Labs  Lab 08/23/17  0336      K 3.6      CO2 27   *   BUN 23   CREATININE 1.7*   CALCIUM 8.3*   ANIONGAP 5*   EGFRNONAA 40.8*       Significant Imaging: I have reviewed and interpreted all pertinent imaging results/findings within the past 24 hours.

## 2017-08-24 NOTE — PROGRESS NOTES
"[facility]  Endocrinology  Progress Note    Admit Date: 8/18/2017     . Cousin is a 68 y/o PMH DM (unclear if Type I vs II), CHF (unk. EF), HTN, EtOH abuse, EILEEN vs CKD who presents as a transfer from [facility] after presenting with chief complaint of AMS. Currently encephalopathic and no family available for collateral history. Per transfer report presented 08/08/2017 with acute personality changes and confusion noticed by wife. Per their report was in "mild" ketoacidosis. Apparently blood glucose values had been extremely labile with patient fluctuating between hypoglycemia and hyperglycemia with minimal insulin so transferred for additional endocrinology evaluation. Unknown length of duration of DM diagnosis or home medications.     Per report mental status has been disorented x3, with agitation and combativeness requiring restraints. CT head at OSH notable for chronic ischemic changes but no acute abnormality. Evaluated by Neurology there who suspected multifactorial metabolic encephalopathy. Also treated for possible EtOH withdrawal.     Reason for Consult: Management of T2DM, Hyperglycemia     Surgical Procedure and Date: unknown    Diabetes diagnosis year: unknown    Home Diabetes Medications:  unknown    How often checking glucose at home? Unknown   BG readings on regimen: Unknown  Hypoglycemia on the regimen?  unknown  Missed doses on regimen? unknown    Diabetes Complications include:     Diabetic chronic kidney disease      and Amputation status    Complicating diabetes co morbidities:   CHF and CKD        Interval HPI:   Overnight events:  Remains on continuous TF 50cc/hr. BS improved, at or above goal. Not eating.    /61 (BP Location: Right arm, Patient Position: Lying)   Pulse 73   Temp 97.4 °F (36.3 °C) (Axillary)   Resp 16   Ht 5' 7" (1.702 m)   Wt 62 kg (136 lb 11 oz)   SpO2 (!) 93%   BMI 21.41 kg/m²       Labs Reviewed and Include    No results for " input(s): GLU, CALCIUM, ALBUMIN, PROT, NA, K, CO2, CL, BUN, CREATININE, ALKPHOS, ALT, AST, BILITOT in the last 24 hours.  Lab Results   Component Value Date    WBC 9.52 08/24/2017    HGB 11.5 (L) 08/24/2017    HCT 33.7 (L) 08/24/2017    MCV 83 08/24/2017     08/24/2017       Recent Labs  Lab 08/19/17  0930   TSH 2.131     Lab Results   Component Value Date    HGBA1C 10.0 (H) 08/18/2017       Nutritional status:   Body mass index is 21.41 kg/m².  Lab Results   Component Value Date    ALBUMIN 1.9 (L) 08/19/2017    ALBUMIN 2.0 (L) 08/19/2017    ALBUMIN 2.0 (L) 08/18/2017     No results found for: PREALBUMIN    Estimated Creatinine Clearance: 37 mL/min (based on Cr of 1.7).    Accu-Checks  Recent Labs      08/23/17   0022  08/23/17   0447  08/23/17   0801  08/23/17   0954  08/23/17   1154  08/23/17   1515  08/23/17   1603  08/23/17   2022  08/24/17   0157  08/24/17   0632   POCTGLUCOSE  214*  266*  252*  183*  279*  217*  198*  173*  212*  106       Current Medications and/or Treatments Impacting Glycemic Control  Immunotherapy:  Immunosuppressants     None        Steroids:   Hormones     None        Pressors:    Autonomic Drugs     None        Hyperglycemia/Diabetes Medications: Antihyperglycemics     Start     Stop Route Frequency Ordered    08/24/17 1000  insulin aspart pen 4 Units      -- SubQ Every 4 hours 08/24/17 0825    08/23/17 2000  insulin detemir pen 8 Units      -- SubQ 2 times daily 08/23/17 1514    08/23/17 1713  insulin aspart pen 0-5 Units      -- SubQ Before meals & nightly PRN 08/23/17 1613          ASSESSMENT and PLAN    Uncontrolled type 1 diabetes mellitus with hyperglycemia    Insulinopenic, treat as type 1. KATELIN negative.   Plan to repeat c-peptide since it was checked during DKA/glucose toxicity.     Continue levemir 8u bid, decrease scheduled novolog 3 units q4hr, low correction, q4hr checks.     Dispo: being discharged to SNF on continuous TF so anticipating above regimen of levemir and  scheduled novolog q4hr. If facility cannot do q4hr novolog, will substitute novolog with regular insulin q6h instead.           * Acute metabolic encephalopathy     -Improving  -Workup per primary team  -TSH nl  -Cortisol ok       Chronic combined systolic and diastolic heart failure     -EF per report 45% with diastolic dysfunction  -On lipitor  -On beta blocker  -Not on ACE/ARB, possibly 2/2 EILEEN vs CKD but would benefit long term   -avoid hypoglycemia        CAD (coronary artery disease)     -Per primary  -On lipitor  Avoid hypoglycemia             Hypothyroidism     -On synthroid 25mcg qd at home, continued here  -TSH nl             Essential hypertension     -Managed per primary  -Long term BP goal at least <140/<80         Marisa Weston MD  Endocrinology  [facility], Yanci Hernandez MD,  have personally taken the history and examined the patient and agree with the resident's note as stated above.

## 2017-08-24 NOTE — PT/OT/SLP PROGRESS
Occupational Therapy  Treatment    Brandon Simmons   MRN: [id number]   Admitting Diagnosis: Acute metabolic encephalopathy    OT Date of Treatment: 08/24/17   OT Start Time: 1454  OT Stop Time: 1520  OT Total Time (min): 26 min    Billable Minutes:  Therapeutic Activity 16 and Therapeutic Exercise 10    General Precautions: Standard, fall  Orthopedic Precautions:    Braces:           Subjective:  Communicated with RN prior to session.  Pain/Comfort  Pain Rating 1: 0/10    Objective:  Patient found with: pressure relief boots, bed alarm     Functional Mobility:  Bed Mobility:  Rolling/Turning Right: Stand by assistance  Scooting/Bridging: Maximum Assistance  Supine to Sit: Minimum Assistance  Sit to Supine: Stand by Assistance    Transfers:   Sit <> Stand Assistance: Activity did not occur    Functional Ambulation: Did not occur. Pt declined.    Activities of Daily Living:     LE Dressing Level of Assistance: Moderate assistance (To brooke sock on left foot.)  Grooming Position: EOB  Grooming Level of Assistance: Minimum assistance        Balance:   Static Sit: GOOD: Takes MODERATE challenges from all directions  Dynamic Sit: GOOD-: Maintains balance through MODERATE excursions of active trunk movement,       Therapeutic Activities and Exercises:  From EOB and supine, pt completed UE/LE AROM x 20 reps each including:  - elbow/shld flexion  - hip/knee flexion/extension    Pt able to sit EOB x ~ 15 mins with SBA for balance while engaging in tasks.   Pt declined to attempt standing due to fatigue.    AM-PAC 6 CLICK ADL   How much help from another person does this patient currently need?   1 = Unable, Total/Dependent Assistance  2 = A lot, Maximum/Moderate Assistance  3 = A little, Minimum/Contact Guard/Supervision  4 = None, Modified [place]/Independent    Putting on and taking off regular lower body clothing? : 2  Bathing (including washing, rinsing, drying)?: 2  Toileting, which includes using toilet, bedpan, or  "urinal? : 1  Putting on and taking off regular upper body clothing?: 3  Taking care of personal grooming such as brushing teeth?: 3  Eating meals?: 3  Total Score: 14     AM-PAC Raw Score CMS "G-Code Modifier Level of Impairment Assistance   6 % Total / Unable   7 - 8 CM 80 - 100% Maximal Assist   9-13 CL 60 - 80% Moderate Assist   14 - 19 CK 40 - 60% Moderate Assist   20 - 22 CJ 20 - 40% Minimal Assist   23 CI 1-20% SBA / CGA   24 CH 0% Independent/ Mod I       Patient left supine with all lines intact, call button in reach and bed alarm on    ASSESSMENT:  Brandon Jinsin is a 67 y.o. male with a medical diagnosis of Acute metabolic encephalopathy and is progressing well towards goals but has inconsistent participation due to cognitive status. Con't with OT services to address decreased (I) in ADLs, functional mobility & t/fs as well as overall strength, ROM, endurance and balance.     Rehab identified problem list/impairments: Rehab identified problem list/impairments: weakness, decreased upper extremity function, gait instability, impaired balance, impaired endurance, decreased lower extremity function, decreased safety awareness, impaired self care skills, impaired cognition, impaired functional mobilty    Rehab potential is fair.    Activity tolerance: Fair    Discharge recommendations: Discharge Facility/Level Of Care Needs: nursing facility, skilled     Barriers to discharge: Barriers to Discharge: None    Equipment recommendations: none     GOALS:    Occupational Therapy Goals        Problem: Occupational Therapy Goal    Goal Priority Disciplines Outcome Interventions   Occupational Therapy Goal     OT, PT/OT Ongoing (interventions implemented as appropriate)    Description:  Goals to be met by: 8/28/17    Patient will increase functional independence with ADLs by performing:    UE Dressing with Moderate Assistance.   LE Dressing with Moderate Assistance. MET 8/24 but continue goal for donning " underwear.  Grooming while EOB with Minimal Assistance. MET 8/24/17  REVISED to SBA.  Rolling to Bilateral with Minimal Assistance.   Supine to sit with Moderate Assistance. MET 8/24/17  REVISED to SBA.  Stand pivot transfers with Maximum Assistance.  Toilet transfer to bedside commode with Maximum Assistance.                       Plan:  Patient to be seen 4 x/week to address the above listed problems via self-care/home management, therapeutic activities, therapeutic exercises, neuromuscular re-education  Plan of Care expires: 09/20/17  Plan of Care reviewed with: patient         DEBBY Rodriguez  08/24/2017

## 2017-08-25 VITALS
RESPIRATION RATE: 16 BRPM | HEIGHT: 67 IN | SYSTOLIC BLOOD PRESSURE: 156 MMHG | OXYGEN SATURATION: 99 % | TEMPERATURE: 97 F | HEART RATE: 72 BPM | DIASTOLIC BLOOD PRESSURE: 65 MMHG | BODY MASS INDEX: 21.45 KG/M2 | WEIGHT: 136.69 LBS

## 2017-08-25 LAB
ANION GAP SERPL CALC-SCNC: 6 MMOL/L
BUN SERPL-MCNC: 21 MG/DL
CALCIUM SERPL-MCNC: 9.6 MG/DL
CHLORIDE SERPL-SCNC: 100 MMOL/L
CO2 SERPL-SCNC: 31 MMOL/L
CREAT SERPL-MCNC: 1.6 MG/DL
EST. GFR  (AFRICAN AMERICAN): 50.8 ML/MIN/1.73 M^2
EST. GFR  (NON AFRICAN AMERICAN): 43.9 ML/MIN/1.73 M^2
GLUCAGON SERPL-MCNC: 127 PG/ML
GLUCOSE SERPL-MCNC: 170 MG/DL
POCT GLUCOSE: 148 MG/DL (ref 70–110)
POCT GLUCOSE: 181 MG/DL (ref 70–110)
POCT GLUCOSE: 181 MG/DL (ref 70–110)
POTASSIUM SERPL-SCNC: 3.6 MMOL/L
SODIUM SERPL-SCNC: 137 MMOL/L

## 2017-08-25 PROCEDURE — 63600175 PHARM REV CODE 636 W HCPCS: Performed by: INTERNAL MEDICINE

## 2017-08-25 PROCEDURE — 25000003 PHARM REV CODE 250: Performed by: STUDENT IN AN ORGANIZED HEALTH CARE EDUCATION/TRAINING PROGRAM

## 2017-08-25 PROCEDURE — 99232 SBSQ HOSP IP/OBS MODERATE 35: CPT | Mod: ,,, | Performed by: INTERNAL MEDICINE

## 2017-08-25 PROCEDURE — 97530 THERAPEUTIC ACTIVITIES: CPT

## 2017-08-25 PROCEDURE — 25000003 PHARM REV CODE 250: Performed by: INTERNAL MEDICINE

## 2017-08-25 PROCEDURE — A4216 STERILE WATER/SALINE, 10 ML: HCPCS | Performed by: INTERNAL MEDICINE

## 2017-08-25 PROCEDURE — 80048 BASIC METABOLIC PNL TOTAL CA: CPT

## 2017-08-25 PROCEDURE — 25000003 PHARM REV CODE 250: Performed by: HOSPITALIST

## 2017-08-25 PROCEDURE — 99238 HOSP IP/OBS DSCHRG MGMT 30/<: CPT | Mod: ,,, | Performed by: HOSPITALIST

## 2017-08-25 PROCEDURE — 36415 COLL VENOUS BLD VENIPUNCTURE: CPT

## 2017-08-25 RX ORDER — INSULIN ASPART 100 [IU]/ML
3 INJECTION, SOLUTION INTRAVENOUS; SUBCUTANEOUS EVERY 4 HOURS
Qty: 10 ML | Refills: 3 | Status: ON HOLD | OUTPATIENT
Start: 2017-08-25 | End: 2018-07-01

## 2017-08-25 RX ORDER — NAPROXEN SODIUM 220 MG/1
81 TABLET, FILM COATED ORAL DAILY
Refills: 0 | COMMUNITY
Start: 2017-08-25 | End: 2018-05-15 | Stop reason: SDUPTHER

## 2017-08-25 RX ORDER — ISOSORBIDE MONONITRATE 30 MG/1
30 TABLET, EXTENDED RELEASE ORAL DAILY
Status: DISCONTINUED | OUTPATIENT
Start: 2017-08-25 | End: 2017-08-25

## 2017-08-25 RX ORDER — AMLODIPINE BESYLATE 10 MG/1
10 TABLET ORAL DAILY
Status: DISCONTINUED | OUTPATIENT
Start: 2017-08-25 | End: 2017-08-25 | Stop reason: HOSPADM

## 2017-08-25 RX ORDER — AMLODIPINE BESYLATE 10 MG/1
10 TABLET ORAL DAILY
Qty: 30 TABLET | Refills: 3 | Status: SHIPPED | OUTPATIENT
Start: 2017-08-25 | End: 2020-02-03 | Stop reason: DRUGHIGH

## 2017-08-25 RX ORDER — FUROSEMIDE 40 MG/1
20 TABLET ORAL EVERY OTHER DAY
Qty: 30 TABLET | Refills: 3 | Status: SHIPPED | OUTPATIENT
Start: 2017-08-25 | End: 2018-09-11 | Stop reason: ALTCHOICE

## 2017-08-25 RX ADMIN — INSULIN DETEMIR 8 UNITS: 100 INJECTION, SOLUTION SUBCUTANEOUS at 10:08

## 2017-08-25 RX ADMIN — AMLODIPINE BESYLATE 10 MG: 10 TABLET ORAL at 10:08

## 2017-08-25 RX ADMIN — INSULIN ASPART 3 UNITS: 100 INJECTION, SOLUTION INTRAVENOUS; SUBCUTANEOUS at 02:08

## 2017-08-25 RX ADMIN — HEPARIN SODIUM 5000 UNITS: 5000 INJECTION, SOLUTION INTRAVENOUS; SUBCUTANEOUS at 06:08

## 2017-08-25 RX ADMIN — CLOPIDOGREL 75 MG: 75 TABLET, FILM COATED ORAL at 10:08

## 2017-08-25 RX ADMIN — ASPIRIN 81 MG CHEWABLE TABLET 81 MG: 81 TABLET CHEWABLE at 10:08

## 2017-08-25 RX ADMIN — Medication 100 MG: at 10:08

## 2017-08-25 RX ADMIN — LEVOTHYROXINE SODIUM 25 MCG: 25 TABLET ORAL at 06:08

## 2017-08-25 RX ADMIN — METOPROLOL TARTRATE 50 MG: 25 TABLET ORAL at 10:08

## 2017-08-25 RX ADMIN — ATORVASTATIN CALCIUM 40 MG: 10 TABLET, FILM COATED ORAL at 10:08

## 2017-08-25 RX ADMIN — INSULIN ASPART 3 UNITS: 100 INJECTION, SOLUTION INTRAVENOUS; SUBCUTANEOUS at 10:08

## 2017-08-25 RX ADMIN — INSULIN ASPART 3 UNITS: 100 INJECTION, SOLUTION INTRAVENOUS; SUBCUTANEOUS at 06:08

## 2017-08-25 RX ADMIN — Medication 3 ML: at 06:08

## 2017-08-25 NOTE — NURSING
Report given to GISELLE Silva at [facility] in [place], [place]. Awaiting [facility] scheduled for 1pm.

## 2017-08-25 NOTE — PLAN OF CARE
Problem: Occupational Therapy Goal  Goal: Occupational Therapy Goal  Goals to be met by: 8/28/17    Patient will increase functional independence with ADLs by performing:    UE Dressing with Moderate Assistance.   LE Dressing with Moderate Assistance. MET 8/24 but continue goal for donning underwear.  Grooming while EOB with Minimal Assistance. MET 8/24/17  REVISED to SBA.  Rolling to Bilateral with Minimal Assistance.   Supine to sit with Moderate Assistance. MET 8/24/17  REVISED to SBA.  Stand pivot transfers with Maximum Assistance.  Toilet transfer to bedside commode with Maximum Assistance.      Outcome: Ongoing (interventions implemented as appropriate)  Patient goals unmet.  Continue with POC  .Jeny Ball, OT  8/25/2017

## 2017-08-25 NOTE — NURSING
Patient IV removed tolerated well. [facility] arrived to transport patient to next facility. Tube feeds stopped. PEG tube flushed and clamped. Packet given to [facility].

## 2017-08-25 NOTE — SUBJECTIVE & OBJECTIVE
"Interval HPI:   Overnight events: none, remains on TFs at 50cc/hr continuous. An AM dose novolog held due to BS 100s by primary. Per wife not eating anything po  Eating:   NPO  Nausea: No  Hypoglycemia and intervention: No  Fever: No  TPN and/or TF: Yes  If yes, type of TF/TPN and rate: Diabetasource @ 50cc/hr (goal)    BP (!) 169/67 (BP Location: Right arm, Patient Position: Lying)   Pulse 69   Temp 98.6 °F (37 °C) (Axillary)   Resp 18   Ht 5' 7" (1.702 m)   Wt 62 kg (136 lb 11 oz)   SpO2 96%   BMI 21.41 kg/m²     Labs Reviewed and Include    No results for input(s): GLU, CALCIUM, ALBUMIN, PROT, NA, K, CO2, CL, BUN, CREATININE, ALKPHOS, ALT, AST, BILITOT in the last 24 hours.  Lab Results   Component Value Date    WBC 9.52 08/24/2017    HGB 11.5 (L) 08/24/2017    HCT 33.7 (L) 08/24/2017    MCV 83 08/24/2017     08/24/2017       Recent Labs  Lab 08/19/17  0930   TSH 2.131     Lab Results   Component Value Date    HGBA1C 10.0 (H) 08/18/2017       Nutritional status:   Body mass index is 21.41 kg/m².  Lab Results   Component Value Date    ALBUMIN 1.9 (L) 08/19/2017    ALBUMIN 2.0 (L) 08/19/2017    ALBUMIN 2.0 (L) 08/18/2017     No results found for: PREALBUMIN    Estimated Creatinine Clearance: 37 mL/min (based on Cr of 1.7).    Accu-Checks  Recent Labs      08/23/17   1603  08/23/17   2022  08/24/17   0157  08/24/17   0632  08/24/17   0916  08/24/17   1459  08/24/17   1809  08/24/17   2243  08/25/17   0245  08/25/17   0636   POCTGLUCOSE  198*  173*  212*  106  192*  324*  252*  252*  181*  148*       Current Medications and/or Treatments Impacting Glycemic Control  Immunotherapy:  Immunosuppressants     None        Steroids:   Hormones     None        Pressors:    Autonomic Drugs     None        Hyperglycemia/Diabetes Medications: Antihyperglycemics     Start     Stop Route Frequency Ordered    08/24/17 1800  insulin aspart pen 3 Units      -- SubQ Every 4 hours 08/24/17 1611    08/23/17 2000  insulin " detemir pen 8 Units      -- SubQ 2 times daily 08/23/17 1514    08/23/17 1713  insulin aspart pen 0-5 Units      -- SubQ Before meals & nightly PRN 08/23/17 1615

## 2017-08-25 NOTE — DISCHARGE SUMMARY
"[facility] Hospital Medicine  Discharge Summary      Patient Name: Brandon Simmons  MRN: [id number]  Admission Date: 8/18/2017  Hospital Length of Stay: 7 days  Discharge Date and Time:  08/25/2017 2:58 PM  Attending Physician: No att. providers found   Discharging Provider: Raina Casillas MD  Primary Care Provider: Neville Willis MD  Hospital Medicine Team: [facility] 5 Raina Casillas MD    HPI:   Mr. Simmons is a 68 y/o PMH DM (unclear if Type I vs II), CHF (unk. EF), HTN, EtOH abuse, EILEEN vs CKD who presents as a transfer from [facility] after presenting with chief complaint of AMS. Currently encephalopathic and no family available for collateral history. Per transfer report presented 08/08/2017 with acute personality changes and confusion noticed by wife. Per their report was in "mild" ketoacidosis. Apparently blood glucose values had been extremely labile with patient fluctuating between hypoglycemia and hyperglycemia with minimal insulin so transferred for additional endocrinology evaluation. Unknown length of duration of DM diagnosis or home medications.      Per report mental status has been disorented x3, with agitation and combativeness requiring restraints. CT head at OSH notable for chronic ischemic changes but no acute abnormality. Evaluated by Neurology there who suspected multifactorial metabolic encephalopathy. Also treated for possible EtOH withdrawal.     * No surgery found *      Indwelling Lines/Drains at time of discharge:   Lines/Drains/Airways     Drain                 Gastrostomy/Enterostomy 08/17/17 Percutaneous endoscopic gastrostomy (PEG) other (see comments) feeding 8 days          Pressure Ulcer                 Pressure Ulcer 08/20/17 1658 Left heel suspected deep tissue injury 4 days              Hospital Course:   Admitted on 8/18/2017 for acute metabolic encephalopathy found to be in DKA and started on DKA protocol with insulin infusion. Endocrine workup " revealed a diagnosis of DM type I. DKA had resolved by second day of admission.   8/21: stepped down to hospital medicine, pt tolerating tube feed well and remains on insulin gtt,while on continue tube feed   8/22:  resume thyroid supplement, pt not particpating in PO intake even though he was cleared by speech for puree diet  Insulin drip transitioned to subcu on continuous feed pt to take detemir 8 BID with novolog 3 units Q4, pt will be transferred to nursing facility today     Review of Systems   Constitutional: Negative for chills and fever.   Respiratory: Negative for cough and sputum production.    Cardiovascular: Negative for chest pain.   Gastrointestinal: Negative for nausea and vomiting.     Physical Exam   Constitutional: No distress.   HENT:   Head: Normocephalic and atraumatic.   Eyes: Right eye exhibits no discharge. Left eye exhibits no discharge.   Cardiovascular: Normal rate and regular rhythm.  Exam reveals no gallop and no friction rub.    Pulmonary/Chest: Effort normal and breath sounds normal. No respiratory distress. He has no wheezes. He has no rales.   Abdominal: Soft. Bowel sounds are normal. He exhibits no distension. There is no tenderness.   Musculoskeletal: He exhibits no edema.   Skin: Skin is warm and dry. He is not diaphoretic. No erythema.       Consults:   Consults         Status Ordering Provider     Inpatient consult to Dietary  Once     Provider:  (Not yet assigned)    Completed CUATE STRANGE     Inpatient consult to Endocrinology  Once     Provider:  (Not yet assigned)    CUATE Belcher     Inpatient consult to Neurology  Once     Provider:  (Not yet assigned)    CUATE Belcher            Pending Diagnostic Studies:     Procedure Component Value Units Date/Time    Hemoglobin A1c [[id number]] Collected:  08/19/17 0145    Order Status:  Sent Lab Status:  In process Updated:  08/19/17 0149    Specimen:  Blood from Blood         Final Active  Diagnoses:    Diagnosis Date Noted POA    PRINCIPAL PROBLEM:  Acute metabolic encephalopathy [G93.41] 08/19/2017 Yes    Diabetic ketoacidosis [E13.10] 08/18/2017 Yes    EILEEN (acute kidney injury) [N17.9] 04/13/2015 Yes    On tube feeding diet [Z78.9] 08/21/2017 Yes    Suspected deep tissue injury [Z04.9] 08/21/2017 Not Applicable    Uncontrolled type 1 diabetes mellitus with hyperglycemia [E10.65] 08/20/2017 Yes    CAD (coronary artery disease) [I25.10] 08/19/2017 Yes    CKD (chronic kidney disease) stage 3, GFR 30-59 ml/min [N18.3] 08/19/2017 Yes    Essential hypertension [I10] 08/19/2017 Yes    High anion gap metabolic acidosis [E87.2] 08/19/2017 Yes    PVD (peripheral vascular disease) [I73.9] 01/27/2013 Yes      Problems Resolved During this Admission:    Diagnosis Date Noted Date Resolved POA    Uncontrolled type 2 diabetes mellitus with ketoacidosis without coma, with long-term current use of insulin [E13.10, Z79.4] 08/19/2017 08/20/2017 Not Applicable      * Acute metabolic encephalopathy    improved  - Delirium vs DKA  -TSH nl  -Cortisol ok  - EEG showed no seizure activity only mild encephalopaty pic            Diabetic ketoacidosis    -resolved  -Type II, seen in clinic in 2016 with dx type 2; however, unable to obtain collateral and possibly Type I, per family on insulin gtt till CVA in 05/2017 and since on basal bolus per wife  - Would suspect chronic diagnosis given AKA, CAD, possible CKD  - C-peptide undetectable with BS in 400s; however, may be due to stunning and needs repeat after DKA resolved  - KATELIN Ab is normal  - Anion gap closed, corrected anion gap borderline at 17 this AM but remains acidotic, would consider additional concurrent non-gap acidosis (EILEEN vs RTA4)  - On continues PEG feed as the pt is refusing to eat.  - transitioned from insulin drip to SC insulin, tolerating well no episodes of hypo/hyperglycemia  -continue detemir 8 BID and novolog 3 q4          EILEEN (acute kidney  injury)    - trending down to 1.6 from 1.9 on admission   - baseline 1.5          Uncontrolled type 1 diabetes mellitus with hyperglycemia    - F/U endocrinology consult adjusted novolog does according to their recommandations  - On continues PEG feeding  - see DKA          Essential hypertension    - resume metoprolol 50 mg bid  - controled        CKD (chronic kidney disease) stage 3, GFR 30-59 ml/min    -EF per report 45% with diastolic dysfunction  -On lipitor  -On beta blocker  -Not on ACE/ARB, possibly 2/2 EILEEN vs CKD, will resume when at baseline        CAD (coronary artery disease)    - Resume atorvastatin 40 mg OD and aspirin 81        PVD (peripheral vascular disease)    -Hx of right BKA  - left foot ulcer, followed by wound care              Discharged Condition: good    Disposition: Home or Self Care    Follow Up:  Follow-up Information     Schedule an appointment as soon as possible for a visit with Neville Willis MD.    Specialty:  Internal Medicine  Contact information:  [address] [place] [address] [phone]                 Patient Instructions:     Ambulatory consult to Neuropsychology   Referral Priority: Routine Referral Type: Psychiatric   Referral Reason: Specialty Services Required    Requested Specialty: Psychology    Number of Visits Requested: 1      Ambulatory consult to Neurology   Referral Priority: Routine Referral Type: Consultation   Referral Reason: Specialty Services Required    Requested Specialty: Neurology    Number of Visits Requested: 1      Diet general       Medications:  Reconciled Home Medications:   Discharge Medication List as of 8/25/2017  1:19 PM      START taking these medications    Details   aspirin 81 MG Chew Take 1 tablet (81 mg total) by mouth once daily., Starting Fri 8/25/2017, Until Sat 8/25/2018, OTC      insulin detemir (LEVEMIR FLEXTOUCH) 100 unit/mL (3 mL) SubQ InPn pen Inject 8 Units into the skin 2 (two) times daily., Starting Fri  8/25/2017, Until Sat 8/25/2018, Normal         CONTINUE these medications which have CHANGED    Details   amlodipine (NORVASC) 10 MG tablet Take 1 tablet (10 mg total) by mouth once daily., Starting Fri 8/25/2017, Normal      furosemide (LASIX) 40 MG tablet Take 0.5 tablets (20 mg total) by mouth every other day., Starting Fri 8/25/2017, Normal      NOVOLOG 100 unit/mL injection Inject 3 Units into the skin every 4 (four) hours. Per sliding scale, Starting Fri 8/25/2017, Normal         CONTINUE these medications which have NOT CHANGED    Details   amitriptyline (ELAVIL) 25 MG tablet Take 25 mg by mouth every evening. , Historical Med      atorvastatin (LIPITOR) 80 MG tablet Take 80 mg by mouth nightly., Historical Med      clopidogrel (PLAVIX) 75 mg tablet Take 75 mg by mouth once daily.  , Until Discontinued, Historical Med      CONTOUR NEXT STRIPS Strp test SIX TIMES DAILY, Historical Med      isosorbide mononitrate (IMDUR) 30 MG 24 hr tablet Take 30 mg by mouth once daily.  , Until Discontinued, Historical Med      levothyroxine (SYNTHROID) 25 MCG tablet Take 25 mcg by mouth once daily., Historical Med      lisinopril (PRINIVIL,ZESTRIL) 20 MG tablet Take 1 tablet (20 mg total) by mouth once daily., Starting 10/17/2016, Until Discontinued, No Print      metoprolol tartrate (LOPRESSOR) 100 MG tablet Take 100 mg by mouth 2 (two) times daily., Starting 6/1/2016, Until Discontinued, Historical Med      omeprazole (PRILOSEC) 20 MG capsule Take 20 mg by mouth once daily. , Starting Wed 8/24/2016, Historical Med      pyridoxine, vitamin B6, (VITAMIN B-6) 100 MG Tab Take 50 mg by mouth once daily., Historical Med      vitamin D 1000 units Tab Take 185 mg by mouth once daily., Historical Med         STOP taking these medications       aspirin 325 MG tablet Comments:   Reason for Stopping:         clonidine (CATAPRES) 0.1 MG tablet Comments:   Reason for Stopping:         gabapentin (NEURONTIN) 600 MG tablet Comments:    Reason for Stopping:         hydrochlorothiazide (HYDRODIURIL) 25 MG tablet Comments:   Reason for Stopping:         lansoprazole (PREVACID) 30 MG capsule Comments:   Reason for Stopping:         losartan (COZAAR) 100 MG tablet Comments:   Reason for Stopping:         metoprolol succinate (TOPROL-XL) 100 MG 24 hr tablet Comments:   Reason for Stopping:         pantoprazole (PROTONIX) 40 MG tablet Comments:   Reason for Stopping:         ranolazine (RANEXA) 500 MG Tb12 Comments:   Reason for Stopping:         simvastatin (ZOCOR) 20 MG tablet Comments:   Reason for Stopping:         sucralfate (CARAFATE) 1 gram tablet Comments:   Reason for Stopping:                 HOS POC IP DISCHARGE SUMMARY    Raina Casillas MD  Department of Hospital Medicine  [facility]

## 2017-08-25 NOTE — PT/OT/SLP DISCHARGE
Physical Therapy Discharge Summary    Brandon Simmons  MRN: [id number]   Acute metabolic encephalopathy       Patient Discharged from acute Physical Therapy on 17.  Please refer to prior PT noted date on 17 for functional status.     Assessment:   Patient appropriate for care in another setting.       GOALS:    Physical Therapy Goals        Problem: Physical Therapy Goal    Goal Priority Disciplines Outcome Goal Variances Interventions   Physical Therapy Goal     PT/OT, PT      Description:  Goals to be met by: 17     Patient will increase functional independence with mobility by performin. Supine to sit with Maximum Assistance  2. Sit to supine with Maximum Assistance  3. Sitting at edge of bed x10 minutes with Maximum Assistance and ability to initiate repositioning to midline.   4. Lower extremity exercise program x10 reps with assistance as needed to improve ROM, flexibility, and strength with functional mobility.                    No goals met at time of discharge.     Reasons for Discontinuation of Therapy Services  Transfer to alternate level of care.      Plan:  Patient Discharged to: Inpatient Rehab.    Bina Eng, PT, DPT  2017

## 2017-08-25 NOTE — PT/OT/SLP PROGRESS
Occupational Therapy  Treatment    Brandon Simmons   MRN: [id number]   Admitting Diagnosis: Acute metabolic encephalopathy    OT Date of Treatment: 08/25/17   OT Start Time: 1025  OT Stop Time: 1044  OT Total Time (min): 19 min    Billable Minutes:  Therapeutic Activity 19    General Precautions: Standard, fall  Orthopedic Precautions: N/A  Braces:  (patient has BKA orthotic in room)         Subjective:  Communicated with RN prior to session.  Patient did not converse with therapist or or in presence of therapist.  Patient in no apparent distress.  Pain/Comfort  Pain Rating 1: 0/10    Objective:  Patient found with: pressure relief boots, bed alarm (condom cath)     Functional Mobility:  Bed Mobility:  Rolling/Turning to Left: Moderate assistance  Scooting/Bridging: Moderate Assistance  Supine to Sit: Moderate Assistance  Sit to Supine: Contact Guard Assistance    Transfers:        Functional Ambulation: Patient did not participate in mobility    Activities of Daily Living:  UE Dressing Level of Assistance: Maximum assistance     Balance:   Static Sit: FAIR+: Able to take MINIMAL challenges from all directions  Dynamic Sit: GOOD-: Maintains balance through MODERATE excursions of active trunk movement,       Therapeutic Activities and Exercises:  Patient and wife educated on importance of increased participation in self care for functional gains.  Encouraged bed mobility with rolling with very minimum assistance.  Sitting edge of bed without support x15 min with minimum challenges.  Upper extremity dressing (robe) max A  Sitting to supine contact gaurd      AM-PAC 6 CLICK ADL   How much help from another person does this patient currently need?   1 = Unable, Total/Dependent Assistance  2 = A lot, Maximum/Moderate Assistance  3 = A little, Minimum/Contact Guard/Supervision  4 = None, Modified Sawyer/Independent    Putting on and taking off regular lower body clothing? : 2  Bathing (including washing, rinsing,  "drying)?: 2  Toileting, which includes using toilet, bedpan, or urinal? : 1  Putting on and taking off regular upper body clothing?: 3  Taking care of personal grooming such as brushing teeth?: 3  Eating meals?: 3  Total Score: 14     AM-PAC Raw Score CMS "G-Code Modifier Level of Impairment Assistance   6 % Total / Unable   7 - 8 CM 80 - 100% Maximal Assist   9-13 CL 60 - 80% Moderate Assist   14 - 19 CK 40 - 60% Moderate Assist   20 - 22 CJ 20 - 40% Minimal Assist   23 CI 1-20% SBA / CGA   24 CH 0% Independent/ Mod I       Patient left supine with tech bathers and wife present    ASSESSMENT:  Brandon Simmons is a 67 y.o. male with a medical diagnosis of Acute metabolic encephalopathy and presents with low affect and very limited engagement and participation but did participate in sitting edge of bed.  Upper extremity dressing with no engagement or participation.  Wife present and with verbal encouragement for patient to participate in session.  Able to tolerate sitting edge of bed without support with Min challenges.  Patient would benefit from continued skilled OT for maximum participation in functional tasks and mobility.     Rehab identified problem list/impairments: Rehab identified problem list/impairments: weakness, impaired endurance, impaired self care skills, impaired functional mobilty, impaired cognition, decreased upper extremity function, decreased lower extremity function    Rehab potential is fair.    Activity tolerance: Fair    Discharge recommendations: Discharge Facility/Level Of Care Needs: nursing facility, skilled     Barriers to discharge: Barriers to Discharge: None    Equipment recommendations: none     GOALS:    Occupational Therapy Goals        Problem: Occupational Therapy Goal    Goal Priority Disciplines Outcome Interventions   Occupational Therapy Goal     OT, PT/OT Ongoing (interventions implemented as appropriate)    Description:  Goals to be met by: 8/28/17    Patient will " increase functional independence with ADLs by performing:    UE Dressing with Moderate Assistance.   LE Dressing with Moderate Assistance. MET 8/24 but continue goal for donning underwear.  Grooming while EOB with Minimal Assistance. MET 8/24/17  REVISED to SBA.  Rolling to Bilateral with Minimal Assistance.   Supine to sit with Moderate Assistance. MET 8/24/17  REVISED to SBA.  Stand pivot transfers with Maximum Assistance.  Toilet transfer to bedside commode with Maximum Assistance.                       Plan:  Patient to be seen 4 x/week to address the above listed problems via self-care/home management, therapeutic activities, therapeutic exercises  Plan of Care expires: 09/20/17  Plan of Care reviewed with: patient, spouse         Jeny Ball, OT  08/25/2017

## 2017-08-25 NOTE — ASSESSMENT & PLAN NOTE
-resolved  -Type II, seen in clinic in 2016 with dx type 2; however, unable to obtain collateral and possibly Type I, per family on insulin gtt till CVA in 05/2017 and since on basal bolus per wife  - Would suspect chronic diagnosis given AKA, CAD, possible CKD  - C-peptide undetectable with BS in 400s; however, may be due to stunning and needs repeat after DKA resolved  - KATELIN Ab is normal  - Anion gap closed, corrected anion gap borderline at 17 this AM but remains acidotic, would consider additional concurrent non-gap acidosis (EILEEN vs RTA4)  - On continues PEG feed as the pt is refusing to eat.  - transitioned from insulin drip to SC insulin, tolerating well no episodes of hypo/hyperglycemia  -continue detemir 8 BID and novolog 3 q4

## 2017-08-25 NOTE — PLAN OF CARE
Problem: Patient Care Overview  Goal: Plan of Care Review  Outcome: Ongoing (interventions implemented as appropriate)  No acute events occurred overnight.  Accuchecks continued q 4 hours.  Scheduled levemir and novolog given.  Pt had no indications of pain.  Tube feedings continued at goal rate of 50 mL/h.  No residuals noted.  250 water boluses given q 6 hours.  No falls or injuries occurred.  Camera at bedside for pt safety.  Wife also remains at bedside.  Pt refused morning labs after multiple attempts at being stuck.  Will continue to monitor.

## 2017-08-25 NOTE — PLAN OF CARE
08/25/17 1051   Medicare Message   Important Message from Medicare regarding Discharge Appeal Rights Given to patient/caregiver;Explained to patient/caregiver;Signed/date by patient/caregiver   Date IMM was signed 08/25/17   Time IMM was signed [id number]

## 2017-08-25 NOTE — PLAN OF CARE
Pt awake today eyes open and nods at times with spouse at bedside.  Spouse informed of acceptance with [facility] and agrees with acceptance.  CM explained IMM form with spouse initialing and CM placed in medical chart.  Covering Nicolasa WRIGHT, cornelius with transfer of pt to Rehab.     08/25/17 1055   Final Note   Assessment Type Final Discharge Note   Discharge Disposition Rehab  ([facility])   Discharge plans and expectations educations in teach back method with documentation complete? Yes   Right Care Referral Info   Post Acute Recommendation SNF / Sub-Acute Rehab  ([facility])     No future appointments.

## 2017-08-25 NOTE — ASSESSMENT & PLAN NOTE
improved  - Delirium vs DKA  -TSH nl  -Cortisol ok  - EEG showed no seizure activity only mild encephalopaty pic

## 2017-08-25 NOTE — MEDICAL/APP STUDENT
Progress Note  Hospital Medicine    Patient Name: Brandon Simmons  YOB: 1950    Admit Date: 8/18/2017                     LOS: 7    SUBJECTIVE:     Reason for Admission:  Acute metabolic encephalopathy  See H&P for detailed presentating history and ROS.      Interval history: No acute event overnight. Pt continues to refuse po intake. Patient denies when asked if he is depressed. But when asked if he wants to speak with psychiatry, patient was silent. Consult patient and wife on possible decrease po intake due to post-stroke depression. Wife voices understanding.     Review of Systems   Constitutional: Negative for chills, fever, malaise/fatigue and weight loss.   HENT: Negative for ear discharge, ear pain, hearing loss and sore throat.    Eyes: Negative for blurred vision and pain.   Respiratory: Negative for cough, hemoptysis and shortness of breath.    Cardiovascular: Negative for chest pain, palpitations, claudication and leg swelling.   Gastrointestinal: Negative for abdominal pain, constipation, diarrhea, nausea and vomiting.   Musculoskeletal: Positive for joint pain (chronic arthritis). Negative for myalgias.   Neurological: Positive for weakness. Negative for dizziness, tingling, focal weakness and headaches.   Psychiatric/Behavioral: Positive for depression.           OBJECTIVE:     Vital Signs Range (Last 24H):  Temp:  [97.6 °F (36.4 °C)-98.9 °F (37.2 °C)]   Pulse:  [69-78]   Resp:  [16-18]   BP: (124-175)/(61-77)   SpO2:  [93 %-96 %] Body mass index is 21.41 kg/m².  Wt Readings from Last 1 Encounters:   08/24/17 0300 62 kg (136 lb 11 oz)   08/23/17 0800 64 kg (141 lb 1.5 oz)   08/23/17 0300 64 kg (141 lb 1.5 oz)   08/22/17 0300 66.4 kg (146 lb 6.2 oz)   08/21/17 0800 66.3 kg (146 lb 2.6 oz)   08/21/17 0500 66.3 kg (146 lb 2.6 oz)   08/19/17 0800 62.8 kg (138 lb 7.2 oz)   08/18/17 2229 62.8 kg (138 lb 7.2 oz)       I & O (Last 24H):  Intake/Output Summary (Last 24 hours) at 08/25/17  1103  Last data filed at 08/24/17 2115   Gross per 24 hour   Intake              785 ml   Output                0 ml   Net              785 ml       Constitutional: He appears well-developed.   HENT: Head: Normocephalic.   Cardiovascular: Normal rate, regular rhythm and normal heart sounds.    No murmur heard.  Pulmonary/Chest: Effort normal and breath sounds normal.   Abdominal: Soft. Bowel sounds are normal. He exhibits no distension. There is no tenderness.       Musculoskeletal: Normal range of motion. BKA on the right. Pain bilateral knee due to arthritis.   Neurological: He is alert.   Psychiatric: He has a normal mood and affect. His behavior is normal.     Diagnostic Results:  Lab Results   Component Value Date    WBC 9.52 08/24/2017    HGB 11.5 (L) 08/24/2017    HCT 33.7 (L) 08/24/2017    MCV 83 08/24/2017     08/24/2017     No results for input(s): GLU, NA, K, CL, CO2, BUN, CREATININE, CALCIUM, MG in the last 24 hours.  Lab Results   Component Value Date    INR 1.1 08/18/2017    INR 1.0 10/15/2016    INR 1.0 08/25/2016     Lab Results   Component Value Date    HGBA1C 10.0 (H) 08/18/2017     Recent Labs      08/24/17   0632  08/24/17   0916  08/24/17   1459  08/24/17   1809  08/24/17   2243  08/25/17   0245  08/25/17   0636  08/25/17   1010   POCTGLUCOSE  106  192*  324*  252*  252*  181*  148*  181*       ASSESSMENT/PLAN:     Active Hospital Problems    Diagnosis  POA    *Acute metabolic encephalopathy [G93.41]  Yes    On tube feeding diet [Z78.9]  Yes    Suspected deep tissue injury [Z04.9]  Not Applicable    Uncontrolled type 1 diabetes mellitus with hyperglycemia [E10.65]  Yes    CAD (coronary artery disease) [I25.10]  Yes    CKD (chronic kidney disease) stage 3, GFR 30-59 ml/min [N18.3]  Yes    Essential hypertension [I10]  Yes    High anion gap metabolic acidosis [E87.2]  Yes    Diabetic ketoacidosis [E13.10]  Yes    EILEEN (acute kidney injury) [N17.9]  Yes    PVD (peripheral vascular  disease) [I73.9]  Yes      Resolved Hospital Problems    Diagnosis Date Resolved POA    Uncontrolled type 2 diabetes mellitus with ketoacidosis without coma, with long-term current use of insulin [E13.10, Z79.4] 08/20/2017 Not Applicable     Problems Addressed Today:  Acute metabolic encephalopathy  - monitor for delirium  - lab work (cortisol and TSH) and EEG unremarkable  - improved, oriented to person, place and time, refusing po intake possible due to post-stroke depression    On tube feeding diet  - refuse po intake as above  - tolerating tube feed without difficulty    Suspect deep tissue injury  - wound care following    Uncontrolled type 1 DM with hyperglycemia  - patient refused morning lab today, POCT glucose 148  - continue detemir 8U bid, aspart 3U q4h, SSI if needed  - endocrinology following, continue to monitor blood glucose    CAD  - resume atorvastatin 40mg OD    CKD stage 3, GFT 30-59 ml/min  EF 45% with diastolic dysfunction  - continue home lipitor and beta blocker  - discontinue ACEi/ARB for now because of possible EILEEN v.s CKD, possible resume when Cr back to baseline (1.5)  - Cr today 1.7    Essential hypertension  - resume metoprolol 50mg bid  - switch isosobi to amlodipine     High anion gap metabolic acidosis  - resolved    DKA  - problem resolved  - see uncontrolled type 1 DM above    EILEEN  Baseline 1.5  - today Cr 1.7  - continue to monitor    PVD  Right BKA  - left foot ulcer followed by wound care    Sofía Jaime (Shelly)  MS3  8/25/17

## 2017-08-25 NOTE — ASSESSMENT & PLAN NOTE
-EF per report 45% with diastolic dysfunction  -On lipitor  -On beta blocker  -Not on ACE/ARB, possibly 2/2 EILEEN vs CKD, will resume when at baseline

## 2017-08-25 NOTE — PROGRESS NOTES
Wound care follow up:  sDTI remains unchanged.  Continue orders to protect with Mepilex border and heel protector. Wound care team to follow prn.  [id number]       08/25/17 1153       Pressure Ulcer 08/20/17 1658 Left heel suspected deep tissue injury   Date First Assessed/Time First Assessed: 08/20/17 1658   Side: Left  Location: heel  Staging: suspected deep tissue injury   Wound Image    Staging suspected deep tissue injury   Healing Pressure Ulcer yes   Pressure Ulcer Risk Factors moisture;shear/friction;nutrition;mobility;activity   Dressing Appearance intact;dry   Drainage Amount none   Drainage Characteristics/Odor no odor   Appearance purple;maroon;reddened   Periwound Area normal skin tone   Wound Edges intact   Wound Length (cm) 2.5   Wound Width (cm) 3   Depth (cm) 0   Dressing foam;silicon sheets

## 2017-08-25 NOTE — PROGRESS NOTES
"[facility]  Endocrinology  Progress Note    Admit Date: 8/18/2017     Mr. Cousin is a 66 y/o PMH DM (unclear if Type I vs II), CHF (unk. EF), HTN, EtOH abuse, EILEEN vs CKD who presents as a transfer from [facility] after presenting with chief complaint of AMS. Currently encephalopathic and no family available for collateral history. Per transfer report presented 08/08/2017 with acute personality changes and confusion noticed by wife. Per their report was in "mild" ketoacidosis. Apparently blood glucose values had been extremely labile with patient fluctuating between hypoglycemia and hyperglycemia with minimal insulin so transferred for additional endocrinology evaluation. Unknown length of duration of DM diagnosis or home medications.     Per report mental status has been disorented x3, with agitation and combativeness requiring restraints. CT head at OSH notable for chronic ischemic changes but no acute abnormality. Evaluated by Neurology there who suspected multifactorial metabolic encephalopathy. Also treated for possible EtOH withdrawal.     Reason for Consult: Management of T2DM, Hyperglycemia     Surgical Procedure and Date: unknown    Diabetes diagnosis year: unknown    Home Diabetes Medications:  unknown    How often checking glucose at home? Unknown   BG readings on regimen: Unknown  Hypoglycemia on the regimen?  unknown  Missed doses on regimen? unknown    Diabetes Complications include:     Diabetic chronic kidney disease      and Amputation status    Complicating diabetes co morbidities:   CHF and CKD        Interval HPI:   Overnight events: none, remains on TFs at 50cc/hr continuous. An AM dose novolog held due to BS 100s by primary. Per wife not eating anything po  Eating:   NPO  Nausea: No  Hypoglycemia and intervention: No  Fever: No  TPN and/or TF: Yes  If yes, type of TF/TPN and rate: Diabetasource @ 50cc/hr (goal)    BP (!) 169/67 (BP Location: Right arm, Patient " "Position: Lying)   Pulse 69   Temp 98.6 °F (37 °C) (Axillary)   Resp 18   Ht 5' 7" (1.702 m)   Wt 62 kg (136 lb 11 oz)   SpO2 96%   BMI 21.41 kg/m²       Labs Reviewed and Include    No results for input(s): GLU, CALCIUM, ALBUMIN, PROT, NA, K, CO2, CL, BUN, CREATININE, ALKPHOS, ALT, AST, BILITOT in the last 24 hours.  Lab Results   Component Value Date    WBC 9.52 08/24/2017    HGB 11.5 (L) 08/24/2017    HCT 33.7 (L) 08/24/2017    MCV 83 08/24/2017     08/24/2017       Recent Labs  Lab 08/19/17  0930   TSH 2.131     Lab Results   Component Value Date    HGBA1C 10.0 (H) 08/18/2017       Nutritional status:   Body mass index is 21.41 kg/m².  Lab Results   Component Value Date    ALBUMIN 1.9 (L) 08/19/2017    ALBUMIN 2.0 (L) 08/19/2017    ALBUMIN 2.0 (L) 08/18/2017     No results found for: PREALBUMIN    Estimated Creatinine Clearance: 37 mL/min (based on Cr of 1.7).    Accu-Checks  Recent Labs      08/23/17   1603  08/23/17   2022  08/24/17   0157  08/24/17   0632  08/24/17   0916  08/24/17   1459  08/24/17   1809  08/24/17   2243  08/25/17   0245  08/25/17   0636   POCTGLUCOSE  198*  173*  212*  106  192*  324*  252*  252*  181*  148*       Current Medications and/or Treatments Impacting Glycemic Control  Immunotherapy:  Immunosuppressants     None        Steroids:   Hormones     None        Pressors:    Autonomic Drugs     None        Hyperglycemia/Diabetes Medications: Antihyperglycemics     Start     Stop Route Frequency Ordered    08/24/17 1800  insulin aspart pen 3 Units      -- SubQ Every 4 hours 08/24/17 1611    08/23/17 2000  insulin detemir pen 8 Units      -- SubQ 2 times daily 08/23/17 1514    08/23/17 1713  insulin aspart pen 0-5 Units      -- SubQ Before meals & nightly PRN 08/23/17 1613          ASSESSMENT and PLAN    Uncontrolled type 1 diabetes mellitus with hyperglycemia     Insulinopenic, treat as type 1. KATELIN negative.   Plan to repeat c-peptide since it was checked during DKA/glucose " toxicity.      Continue levemir 8u bid, decrease scheduled novolog 3 units q4hr, low correction, q4hr checks.      Dispo: being discharged to SNF on continuous TF so anticipating above regimen of levemir and scheduled novolog q4hr. If facility cannot do q4hr novolog, will substitute novolog with regular insulin q6h instead.               * Acute metabolic encephalopathy     -Improving  -Workup per primary team  -TSH nl  -Cortisol ok       Chronic combined systolic and diastolic heart failure     -EF per report 45% with diastolic dysfunction  -On lipitor  -On beta blocker  -Not on ACE/ARB, possibly 2/2 EILEEN vs CKD but would benefit long term   -avoid hypoglycemia        CAD (coronary artery disease)     -Per primary  -On lipitor  Avoid hypoglycemia               Hypothyroidism     -On synthroid 25mcg qd at home, continued here  -TSH nl           Essential hypertension     -Managed per primary  -Long term BP goal at least <140/<80        Ron Cabral IV, MD  Endocrinology  [facility], Yanci Hernandez MD,  have personally taken the history and examined the patient and agree with the resident's note as stated above.

## 2017-08-26 NOTE — PT/OT/SLP DISCHARGE
Occupational Therapy Discharge Summary    Brandon Simmons  MRN: [id number]   Acute metabolic encephalopathy   Patient Discharged from acute Occupational Therapy on 8/26/17.  Please refer to prior OT note dated on 8/25/17 for functional status.     Assessment:   Patient appropriate for care in another setting.  GOALS:    Occupational Therapy Goals        Problem: Occupational Therapy Goal    Goal Priority Disciplines Outcome Interventions   Occupational Therapy Goal     OT, PT/OT Ongoing (interventions implemented as appropriate)    Description:  Goals to be met by: 8/28/17    Patient will increase functional independence with ADLs by performing:    UE Dressing with Moderate Assistance.   LE Dressing with Moderate Assistance. MET 8/24 but continue goal for donning underwear.  Grooming while EOB with Minimal Assistance. MET 8/24/17  REVISED to SBA.  Rolling to Bilateral with Minimal Assistance.   Supine to sit with Moderate Assistance. MET 8/24/17  REVISED to SBA.  Stand pivot transfers with Maximum Assistance.  Toilet transfer to bedside commode with Maximum Assistance.                     Reasons for Discontinuation of Therapy Services  Transfer to alternate level of care.      Plan:  Patient Discharged to: Skilled Nursing Facility.

## 2018-05-15 ENCOUNTER — OFFICE VISIT (OUTPATIENT)
Dept: NEPHROLOGY | Facility: CLINIC | Age: 68
End: 2018-05-15
Payer: COMMERCIAL

## 2018-05-15 VITALS
DIASTOLIC BLOOD PRESSURE: 64 MMHG | SYSTOLIC BLOOD PRESSURE: 110 MMHG | WEIGHT: 148.56 LBS | HEART RATE: 74 BPM | OXYGEN SATURATION: 97 % | BODY MASS INDEX: 23.32 KG/M2 | HEIGHT: 67 IN

## 2018-05-15 DIAGNOSIS — Z95.1 HX OF CABG: ICD-10-CM

## 2018-05-15 DIAGNOSIS — D50.9 IRON DEFICIENCY ANEMIA, UNSPECIFIED IRON DEFICIENCY ANEMIA TYPE: ICD-10-CM

## 2018-05-15 DIAGNOSIS — K25.4 GASTROINTESTINAL HEMORRHAGE ASSOCIATED WITH GASTRIC ULCER: ICD-10-CM

## 2018-05-15 DIAGNOSIS — Z89.519 S/P BKA (BELOW KNEE AMPUTATION) UNILATERAL: ICD-10-CM

## 2018-05-15 DIAGNOSIS — E10.65 UNCONTROLLED TYPE 1 DIABETES MELLITUS WITH HYPERGLYCEMIA: ICD-10-CM

## 2018-05-15 DIAGNOSIS — E10.42 DIABETIC POLYNEUROPATHY ASSOCIATED WITH TYPE 1 DIABETES MELLITUS: ICD-10-CM

## 2018-05-15 DIAGNOSIS — D64.9 ANEMIA, UNSPECIFIED TYPE: ICD-10-CM

## 2018-05-15 DIAGNOSIS — I10 ESSENTIAL HYPERTENSION: ICD-10-CM

## 2018-05-15 DIAGNOSIS — I25.10 CORONARY ARTERY DISEASE, ANGINA PRESENCE UNSPECIFIED, UNSPECIFIED VESSEL OR LESION TYPE, UNSPECIFIED WHETHER NATIVE OR TRANSPLANTED HEART: ICD-10-CM

## 2018-05-15 DIAGNOSIS — N18.30 CKD (CHRONIC KIDNEY DISEASE) STAGE 3, GFR 30-59 ML/MIN: Primary | ICD-10-CM

## 2018-05-15 PROCEDURE — 99999 PR PBB SHADOW E&M-EST. PATIENT-LVL V: CPT | Mod: PBBFAC,,, | Performed by: INTERNAL MEDICINE

## 2018-05-15 PROCEDURE — 3046F HEMOGLOBIN A1C LEVEL >9.0%: CPT | Mod: CPTII,S$GLB,, | Performed by: INTERNAL MEDICINE

## 2018-05-15 PROCEDURE — 99204 OFFICE O/P NEW MOD 45 MIN: CPT | Mod: S$GLB,,, | Performed by: INTERNAL MEDICINE

## 2018-05-15 PROCEDURE — 3078F DIAST BP <80 MM HG: CPT | Mod: CPTII,S$GLB,, | Performed by: INTERNAL MEDICINE

## 2018-05-15 PROCEDURE — 3074F SYST BP LT 130 MM HG: CPT | Mod: CPTII,S$GLB,, | Performed by: INTERNAL MEDICINE

## 2018-05-15 RX ORDER — MONTELUKAST SODIUM 10 MG/1
10 TABLET ORAL NIGHTLY
COMMUNITY
End: 2018-09-11 | Stop reason: ALTCHOICE

## 2018-05-15 RX ORDER — METOPROLOL SUCCINATE 100 MG/1
100 TABLET, EXTENDED RELEASE ORAL 2 TIMES DAILY
COMMUNITY

## 2018-05-15 RX ORDER — AMITRIPTYLINE HYDROCHLORIDE 25 MG/1
25 TABLET, FILM COATED ORAL
COMMUNITY
End: 2018-05-15 | Stop reason: SDUPTHER

## 2018-05-15 RX ORDER — ASPIRIN 81 MG/1
TABLET ORAL
COMMUNITY
End: 2018-07-24 | Stop reason: ALTCHOICE

## 2018-05-15 RX ORDER — ATORVASTATIN CALCIUM 40 MG/1
40 TABLET, FILM COATED ORAL
COMMUNITY
End: 2018-05-15 | Stop reason: SDUPTHER

## 2018-05-15 RX ORDER — LOSARTAN POTASSIUM 100 MG/1
100 TABLET ORAL DAILY
COMMUNITY
End: 2018-09-11 | Stop reason: ALTCHOICE

## 2018-05-15 RX ORDER — AMLODIPINE BESYLATE 10 MG/1
10 TABLET ORAL
COMMUNITY
End: 2018-05-15

## 2018-05-15 RX ORDER — ATORVASTATIN CALCIUM 80 MG/1
80 TABLET, FILM COATED ORAL
COMMUNITY
End: 2018-05-15 | Stop reason: SDUPTHER

## 2018-05-15 RX ORDER — CLONIDINE HYDROCHLORIDE 0.1 MG/1
0.1 TABLET ORAL 2 TIMES DAILY
COMMUNITY
End: 2018-09-11 | Stop reason: ALTCHOICE

## 2018-05-15 NOTE — PATIENT INSTRUCTIONS
1. You are at Stage 3 CKD (3 out of 6 stages, with 6 being dialysis)  2. No anti-inflammatories (see hand out)  3. Increase water intake, 3-4 bottles of water per day of 16.9 ounces  4. Continue low sodium diet  5. Losartan is kidney protective  6. Change iron to QOD

## 2018-05-26 PROBLEM — D50.9 IDA (IRON DEFICIENCY ANEMIA): Status: ACTIVE | Noted: 2018-05-26

## 2018-05-26 NOTE — PROGRESS NOTES
Subjective:       Patient ID: Brandon GABRIEL Cousin is a 67 y.o. Black or  male who presents for new evaluation of Chronic Kidney Disease    HPI     He is referred by his PCP for CKD Stage 3 since at least 2014 in the setting of DM 1 (40 years), HTN and tobacco use.     He notes foamy urine but no gross hematuria. He follows a low sodium diet but admits to poor po fluid intake. No NSAID use nor herbal medications. Home -120 with DBP 60. Last Hba1c was 10.7 in Dec 2017. He is on a daily PPI for severe heartburn and hx of ?gastric ulcers. He has received PRBSc. His brother has kidney disease. He smokes about 1ppd.    Review of Systems   Constitutional: Negative for activity change, appetite change, fatigue and unexpected weight change.   HENT: Negative for facial swelling.    Respiratory: Negative for cough and shortness of breath.    Cardiovascular: Positive for leg swelling. Negative for chest pain.   Gastrointestinal: Positive for constipation. Negative for abdominal distention and abdominal pain.   Endocrine: Positive for polyuria.   Genitourinary: Negative for difficulty urinating, dysuria, frequency, hematuria and urgency.   Musculoskeletal: Positive for arthralgias.   Skin: Negative for rash.   Neurological: Positive for weakness. Negative for headaches.   Hematological: Bruises/bleeds easily.   Psychiatric/Behavioral: Negative for decreased concentration.       Objective:      Physical Exam   Constitutional: He is oriented to person, place, and time. He appears well-developed and well-nourished. No distress.   Neck: No JVD present.   Cardiovascular: S1 normal and S2 normal.  Exam reveals no friction rub.    Pulmonary/Chest: Breath sounds normal. He has no wheezes. He has no rales.   Abdominal: Soft.   Musculoskeletal: He exhibits no edema.   Neurological: He is alert and oriented to person, place, and time.   Skin: Skin is warm and dry.   Psychiatric: He has a normal mood and affect.   Nursing  note and vitals reviewed.      Assessment:       1. CKD (chronic kidney disease) stage 3, GFR 30-59 ml/min    2. Uncontrolled type 1 diabetes mellitus with hyperglycemia    3. Essential hypertension    4. Coronary artery disease, angina presence unspecified, unspecified vessel or lesion type, unspecified whether native or transplanted heart    5. Hx of CABG, 2000    6. Diabetic polyneuropathy associated with type 1 diabetes mellitus        Plan:           CKD Appearing to be at Stage 3 in the setting of uncontrolled DM 1 and HTN and tobacco use. For treatment he is already on on maximum dose of losartan. Multiple u/a dipsticks reveal protein.     HTN is controlled    DM 1--poor control     EMIR--due to constipation can decrease po iron to QOD    He was advised to continue a low sodium diet, increase fluid intake, and avoid NSAIDs. He must improve glycemic control

## 2018-06-29 PROBLEM — R41.82 ALTERED MENTAL STATUS: Status: ACTIVE | Noted: 2018-06-29

## 2018-06-29 PROBLEM — E10.649 UNCONTROLLED TYPE 1 DIABETES MELLITUS WITH HYPOGLYCEMIA: Status: ACTIVE | Noted: 2017-08-20

## 2018-06-29 PROBLEM — E16.2 HYPOGLYCEMIA: Status: ACTIVE | Noted: 2018-06-29

## 2018-07-24 ENCOUNTER — LAB VISIT (OUTPATIENT)
Dept: LAB | Facility: HOSPITAL | Age: 68
End: 2018-07-24
Attending: INTERNAL MEDICINE
Payer: COMMERCIAL

## 2018-07-24 ENCOUNTER — OFFICE VISIT (OUTPATIENT)
Dept: NEPHROLOGY | Facility: CLINIC | Age: 68
End: 2018-07-24
Payer: COMMERCIAL

## 2018-07-24 VITALS
HEIGHT: 69 IN | WEIGHT: 150.56 LBS | DIASTOLIC BLOOD PRESSURE: 58 MMHG | BODY MASS INDEX: 22.3 KG/M2 | SYSTOLIC BLOOD PRESSURE: 104 MMHG

## 2018-07-24 DIAGNOSIS — D50.9 IRON DEFICIENCY ANEMIA, UNSPECIFIED IRON DEFICIENCY ANEMIA TYPE: ICD-10-CM

## 2018-07-24 DIAGNOSIS — I25.10 CORONARY ARTERY DISEASE, ANGINA PRESENCE UNSPECIFIED, UNSPECIFIED VESSEL OR LESION TYPE, UNSPECIFIED WHETHER NATIVE OR TRANSPLANTED HEART: ICD-10-CM

## 2018-07-24 DIAGNOSIS — N17.9 AKI (ACUTE KIDNEY INJURY): ICD-10-CM

## 2018-07-24 DIAGNOSIS — N18.30 CKD (CHRONIC KIDNEY DISEASE) STAGE 3, GFR 30-59 ML/MIN: ICD-10-CM

## 2018-07-24 DIAGNOSIS — E10.649 UNCONTROLLED TYPE 1 DIABETES MELLITUS WITH HYPOGLYCEMIA WITHOUT COMA: ICD-10-CM

## 2018-07-24 DIAGNOSIS — I50.42 CHRONIC COMBINED SYSTOLIC AND DIASTOLIC HEART FAILURE: ICD-10-CM

## 2018-07-24 DIAGNOSIS — N18.30 CKD (CHRONIC KIDNEY DISEASE) STAGE 3, GFR 30-59 ML/MIN: Primary | ICD-10-CM

## 2018-07-24 DIAGNOSIS — I10 ESSENTIAL HYPERTENSION: ICD-10-CM

## 2018-07-24 DIAGNOSIS — E10.42 DIABETIC POLYNEUROPATHY ASSOCIATED WITH TYPE 1 DIABETES MELLITUS: ICD-10-CM

## 2018-07-24 DIAGNOSIS — Z95.1 HX OF CABG: ICD-10-CM

## 2018-07-24 DIAGNOSIS — D64.9 ANEMIA, UNSPECIFIED TYPE: ICD-10-CM

## 2018-07-24 LAB
ANION GAP SERPL CALC-SCNC: 7 MMOL/L
BUN SERPL-MCNC: 38 MG/DL
CALCIUM SERPL-MCNC: 9.6 MG/DL
CHLORIDE SERPL-SCNC: 107 MMOL/L
CO2 SERPL-SCNC: 23 MMOL/L
CREAT SERPL-MCNC: 2.5 MG/DL
EST. GFR  (AFRICAN AMERICAN): 29.6 ML/MIN/1.73 M^2
EST. GFR  (NON AFRICAN AMERICAN): 25.6 ML/MIN/1.73 M^2
GLUCOSE SERPL-MCNC: 157 MG/DL
POTASSIUM SERPL-SCNC: 5.1 MMOL/L
SODIUM SERPL-SCNC: 137 MMOL/L

## 2018-07-24 PROCEDURE — 3045F PR MOST RECENT HEMOGLOBIN A1C LEVEL 7.0-9.0%: CPT | Mod: CPTII,S$GLB,, | Performed by: INTERNAL MEDICINE

## 2018-07-24 PROCEDURE — 80048 BASIC METABOLIC PNL TOTAL CA: CPT

## 2018-07-24 PROCEDURE — 99214 OFFICE O/P EST MOD 30 MIN: CPT | Mod: S$GLB,,, | Performed by: INTERNAL MEDICINE

## 2018-07-24 PROCEDURE — 3074F SYST BP LT 130 MM HG: CPT | Mod: CPTII,S$GLB,, | Performed by: INTERNAL MEDICINE

## 2018-07-24 PROCEDURE — 36415 COLL VENOUS BLD VENIPUNCTURE: CPT | Mod: PO

## 2018-07-24 PROCEDURE — 99999 PR PBB SHADOW E&M-EST. PATIENT-LVL III: CPT | Mod: PBBFAC,,, | Performed by: INTERNAL MEDICINE

## 2018-07-24 PROCEDURE — 3078F DIAST BP <80 MM HG: CPT | Mod: CPTII,S$GLB,, | Performed by: INTERNAL MEDICINE

## 2018-07-24 RX ORDER — ASPIRIN 325 MG
325 TABLET ORAL DAILY
COMMUNITY
End: 2018-09-11

## 2018-07-24 RX ORDER — VIT C/E/ZN/COPPR/LUTEIN/ZEAXAN 250MG-90MG
CAPSULE ORAL
COMMUNITY
End: 2018-07-24

## 2018-07-24 RX ORDER — AMLODIPINE BESYLATE AND ATORVASTATIN CALCIUM 10; 10 MG/1; MG/1
TABLET, FILM COATED ORAL
COMMUNITY
End: 2018-09-11 | Stop reason: ALTCHOICE

## 2018-07-24 RX ORDER — LEVOTHYROXINE SODIUM 25 UG/1
TABLET ORAL
COMMUNITY
End: 2018-07-24

## 2018-07-24 RX ORDER — TICAGRELOR 90 MG/1
90 TABLET ORAL 2 TIMES DAILY
Refills: 0 | COMMUNITY
Start: 2018-07-16 | End: 2018-09-11 | Stop reason: ALTCHOICE

## 2018-07-24 RX ORDER — OMEPRAZOLE 20 MG/1
20 CAPSULE, DELAYED RELEASE ORAL
COMMUNITY
End: 2018-07-24

## 2018-07-24 RX ORDER — INSULIN ASPART 100 [IU]/ML
INJECTION, SOLUTION INTRAVENOUS; SUBCUTANEOUS
COMMUNITY
End: 2018-07-24

## 2018-07-24 RX ORDER — PYRIDOXINE HCL (VITAMIN B6) 100 MG
50 TABLET ORAL
COMMUNITY
End: 2018-07-24

## 2018-07-24 RX ORDER — MONTELUKAST SODIUM 10 MG/1
10 TABLET ORAL
COMMUNITY
End: 2018-07-24

## 2018-07-24 RX ORDER — NAPROXEN SODIUM 220 MG/1
81 TABLET, FILM COATED ORAL
COMMUNITY
End: 2018-07-24

## 2018-07-24 RX ORDER — LOSARTAN POTASSIUM 100 MG/1
100 TABLET ORAL
COMMUNITY
End: 2018-07-24

## 2018-07-24 RX ORDER — CLONIDINE HYDROCHLORIDE 0.1 MG/1
0.1 TABLET ORAL
COMMUNITY
End: 2018-07-24

## 2018-07-24 RX ORDER — FUROSEMIDE 40 MG/1
40 TABLET ORAL
COMMUNITY
End: 2018-07-24

## 2018-07-24 NOTE — PATIENT INSTRUCTIONS
1. Pepcid or Zantac is OK for reflux symptoms     2. If heartburn not relieved with the above, resume omeprazole but every other day

## 2018-07-25 ENCOUNTER — PATIENT MESSAGE (OUTPATIENT)
Dept: NEPHROLOGY | Facility: CLINIC | Age: 68
End: 2018-07-25

## 2018-07-25 DIAGNOSIS — N17.9 AKI (ACUTE KIDNEY INJURY): Primary | ICD-10-CM

## 2018-08-02 NOTE — TELEPHONE ENCOUNTER
Wife informed to have patient read [employer] message.  WIll mail orders for repeat labs at [facility].

## 2018-08-04 NOTE — PROGRESS NOTES
Subjective:       Patient ID: Brandon GABRIEL Cousin is a 68 y.o. Black or  male who presents for new evaluation of Chronic Kidney Disease; Hospital Follow Up; and Acute Renal Failure    HPI     He is referred by his PCP for CKD Stage 3 since at least 2014 in the setting of DM 1 (40 years), HTN and tobacco use.   He notes foamy urine but no gross hematuria. He follows a low sodium diet but admits to poor po fluid intake. No NSAID use nor herbal medications. Home -120 with DBP 60. Last Hba1c was 10.7 in Dec 2017. He is on a daily PPI for severe heartburn and hx of ?gastric ulcers. He has received PRBSc. His brother has kidney disease. He smokes about 1ppd.    Interval history: He presents sooner than scheduled routine follow up for EILEEN during recent hospital stay. His kidney function was improving by discharge. His BS at home continue to be labile. Still smoking with no interest in quitting    Review of Systems   Constitutional: Negative for activity change, appetite change, fatigue and unexpected weight change.   HENT: Negative for facial swelling.    Respiratory: Negative for cough and shortness of breath.    Cardiovascular: Positive for leg swelling. Negative for chest pain.   Gastrointestinal: Positive for constipation. Negative for abdominal distention and abdominal pain.   Endocrine: Positive for polyuria.   Genitourinary: Negative for difficulty urinating, dysuria, frequency, hematuria and urgency.   Musculoskeletal: Positive for arthralgias.   Skin: Negative for rash.   Neurological: Positive for weakness. Negative for headaches.   Hematological: Bruises/bleeds easily.   Psychiatric/Behavioral: Negative for decreased concentration.       Objective:      Physical Exam   Constitutional: He is oriented to person, place, and time. He appears well-developed and well-nourished. No distress.   Neck: No JVD present.   Cardiovascular: S1 normal and S2 normal.  Exam reveals no friction rub.     Pulmonary/Chest: Breath sounds normal. He has no wheezes. He has no rales.   Abdominal: Soft.   Musculoskeletal: He exhibits no edema.   Neurological: He is alert and oriented to person, place, and time.   Skin: Skin is warm and dry.   Psychiatric: He has a normal mood and affect.   Nursing note and vitals reviewed.      Assessment:       1. CKD (chronic kidney disease) stage 3, GFR 30-59 ml/min    2. EILEEN (acute kidney injury)    3. Anemia, unspecified type    4. Iron deficiency anemia, unspecified iron deficiency anemia type    5. Uncontrolled type 1 diabetes mellitus with hypoglycemia without coma    6. Essential hypertension    7. Hx of CABG, 2000    8. Coronary artery disease, angina presence unspecified, unspecified vessel or lesion type, unspecified whether native or transplanted heart    9. Chronic combined systolic and diastolic heart failure    10. Diabetic polyneuropathy associated with type 1 diabetes mellitus        Plan:             Recent EILEEN that was improving- recheck labs today    CKD at Stage 3 in the setting of uncontrolled DM 1 and HTN and tobacco use. For treatment he is already on on maximum dose of losartan. Multiple u/a dipsticks reveal protein.     HTN is controlled    DM 1--poor control     EMIR--continue po iron  QOD    GI--stop PPI    He was advised to continue a low sodium diet, increase fluid intake, and avoid NSAIDs. He must improve glycemic control

## 2018-08-07 ENCOUNTER — LAB VISIT (OUTPATIENT)
Dept: LAB | Facility: HOSPITAL | Age: 68
End: 2018-08-07
Attending: INTERNAL MEDICINE
Payer: COMMERCIAL

## 2018-08-07 ENCOUNTER — OFFICE VISIT (OUTPATIENT)
Dept: NEPHROLOGY | Facility: CLINIC | Age: 68
End: 2018-08-07
Payer: COMMERCIAL

## 2018-08-07 VITALS
SYSTOLIC BLOOD PRESSURE: 124 MMHG | BODY MASS INDEX: 22.49 KG/M2 | HEIGHT: 69 IN | HEART RATE: 75 BPM | OXYGEN SATURATION: 98 % | DIASTOLIC BLOOD PRESSURE: 64 MMHG | WEIGHT: 151.88 LBS

## 2018-08-07 DIAGNOSIS — I50.42 CHRONIC COMBINED SYSTOLIC AND DIASTOLIC HEART FAILURE: ICD-10-CM

## 2018-08-07 DIAGNOSIS — Z89.519 S/P BKA (BELOW KNEE AMPUTATION) UNILATERAL: ICD-10-CM

## 2018-08-07 DIAGNOSIS — N17.9 AKI (ACUTE KIDNEY INJURY): ICD-10-CM

## 2018-08-07 DIAGNOSIS — N18.30 CKD (CHRONIC KIDNEY DISEASE) STAGE 3, GFR 30-59 ML/MIN: ICD-10-CM

## 2018-08-07 DIAGNOSIS — E10.649 UNCONTROLLED TYPE 1 DIABETES MELLITUS WITH HYPOGLYCEMIA WITHOUT COMA: ICD-10-CM

## 2018-08-07 DIAGNOSIS — I10 ESSENTIAL HYPERTENSION: ICD-10-CM

## 2018-08-07 DIAGNOSIS — E87.29 HIGH ANION GAP METABOLIC ACIDOSIS: ICD-10-CM

## 2018-08-07 DIAGNOSIS — N18.30 CKD (CHRONIC KIDNEY DISEASE) STAGE 3, GFR 30-59 ML/MIN: Primary | ICD-10-CM

## 2018-08-07 DIAGNOSIS — I25.10 CORONARY ARTERY DISEASE, ANGINA PRESENCE UNSPECIFIED, UNSPECIFIED VESSEL OR LESION TYPE, UNSPECIFIED WHETHER NATIVE OR TRANSPLANTED HEART: ICD-10-CM

## 2018-08-07 LAB
ANION GAP SERPL CALC-SCNC: 9 MMOL/L
BUN SERPL-MCNC: 33 MG/DL
CALCIUM SERPL-MCNC: 9.6 MG/DL
CHLORIDE SERPL-SCNC: 97 MMOL/L
CO2 SERPL-SCNC: 25 MMOL/L
CREAT SERPL-MCNC: 2.4 MG/DL
EST. GFR  (AFRICAN AMERICAN): 30.9 ML/MIN/1.73 M^2
EST. GFR  (NON AFRICAN AMERICAN): 26.7 ML/MIN/1.73 M^2
GLUCOSE SERPL-MCNC: 528 MG/DL
POTASSIUM SERPL-SCNC: 5 MMOL/L
SODIUM SERPL-SCNC: 131 MMOL/L

## 2018-08-07 PROCEDURE — 99999 PR PBB SHADOW E&M-EST. PATIENT-LVL IV: CPT | Mod: PBBFAC,,, | Performed by: INTERNAL MEDICINE

## 2018-08-07 PROCEDURE — 3078F DIAST BP <80 MM HG: CPT | Mod: CPTII,S$GLB,, | Performed by: INTERNAL MEDICINE

## 2018-08-07 PROCEDURE — 36415 COLL VENOUS BLD VENIPUNCTURE: CPT | Mod: PO

## 2018-08-07 PROCEDURE — 99214 OFFICE O/P EST MOD 30 MIN: CPT | Mod: S$GLB,,, | Performed by: INTERNAL MEDICINE

## 2018-08-07 PROCEDURE — 80048 BASIC METABOLIC PNL TOTAL CA: CPT

## 2018-08-07 PROCEDURE — 3074F SYST BP LT 130 MM HG: CPT | Mod: CPTII,S$GLB,, | Performed by: INTERNAL MEDICINE

## 2018-08-07 PROCEDURE — 3045F PR MOST RECENT HEMOGLOBIN A1C LEVEL 7.0-9.0%: CPT | Mod: CPTII,S$GLB,, | Performed by: INTERNAL MEDICINE

## 2018-08-12 NOTE — PROGRESS NOTES
Subjective:       Patient ID: Brandon GABRIEL Cousin is a 68 y.o. Black or  male who presents for new evaluation of Chronic Kidney Disease    HPI     He is referred by his PCP for CKD Stage 3 since at least 2014 in the setting of DM 1 (40 years), HTN and tobacco use.   He notes foamy urine but no gross hematuria. He follows a low sodium diet but admits to poor po fluid intake. No NSAID use nor herbal medications. Home -120 with DBP 60. Last Hba1c was 10.7 in Dec 2017. He is on a daily PPI for severe heartburn and hx of ?gastric ulcers. He has received PRBSc. His brother has kidney disease. He smokes about 1ppd.    Interval history: He presents sooner than scheduled routine follow up. Wife is concerned with decreased po intake and vomiting. [facility]. She is also concerned about her upcoming hip replacement and who will take care of her  if she has complications    Review of Systems   Constitutional: Negative for activity change, appetite change, fatigue and unexpected weight change.   HENT: Negative for facial swelling.    Respiratory: Negative for cough and shortness of breath.    Cardiovascular: Positive for leg swelling. Negative for chest pain.   Gastrointestinal: Positive for constipation. Negative for abdominal distention and abdominal pain.   Endocrine: Positive for polyuria.   Genitourinary: Negative for difficulty urinating, dysuria, frequency, hematuria and urgency.   Musculoskeletal: Positive for arthralgias.   Skin: Negative for rash.   Neurological: Positive for weakness. Negative for headaches.   Hematological: Bruises/bleeds easily.   Psychiatric/Behavioral: Negative for decreased concentration.       Objective:      Physical Exam   Constitutional: He is oriented to person, place, and time. He appears well-developed and well-nourished. No distress.   Neck: No JVD present.   Cardiovascular: S1 normal and S2 normal. Exam reveals no  friction rub.   Pulmonary/Chest: Breath sounds normal. He has no wheezes. He has no rales.   Abdominal: Soft.   Musculoskeletal: He exhibits no edema.   Neurological: He is alert and oriented to person, place, and time.   Skin: Skin is warm and dry.   Psychiatric: He has a normal mood and affect.   Nursing note and vitals reviewed.      Assessment:       1. CKD (chronic kidney disease) stage 3, GFR 30-59 ml/min    2. EILEEN (acute kidney injury)    3. High anion gap metabolic acidosis    4. Essential hypertension    5. Chronic combined systolic and diastolic heart failure    6. Coronary artery disease, angina presence unspecified, unspecified vessel or lesion type, unspecified whether native or transplanted heart    7. Uncontrolled type 1 diabetes mellitus with hypoglycemia without coma    8. S/P BKA (below knee amputation) unilateral        Plan:             Recent EILEEN that was improving- recheck labs today    CKD at Stage 3 in the setting of uncontrolled DM 1 and HTN and tobacco use. For treatment he is already on on maximum dose of losartan.     HTN is controlled    DM 1--poor control     EMIR--continue po iron QOD    GI--off PPI    He was advised to continue a low sodium diet, increase fluid intake, and avoid NSAIDs. He must improve glycemic control      Keep September appt

## 2018-09-13 PROBLEM — I73.9 PAD (PERIPHERAL ARTERY DISEASE): Status: ACTIVE | Noted: 2018-09-13

## 2018-09-13 PROBLEM — I73.9 CLAUDICATION: Status: ACTIVE | Noted: 2018-09-13

## 2018-11-21 ENCOUNTER — TELEPHONE (OUTPATIENT)
Dept: NEPHROLOGY | Facility: CLINIC | Age: 68
End: 2018-11-21

## 2018-11-21 NOTE — TELEPHONE ENCOUNTER
----- Message from Verónica Newsome sent at 11/21/2018  8:50 AM CST -----  Contact: Elvia  Type: Needs Medical Advice    Who Called:  wife  Best Call Back Number: [phone]  Additional Information:  Admitted to [facility] on Saturday 11/17/18; on Sunday a kidney specialist called in and due to kidneys with also having a lung infection as had food particles in lungs; now in [facility] as of 11/20/18; asked for [facility] but they had no beds. She is asking if will consult with doctor at [facility] regarding his kidneys since she has the background before doctor at [facility] does anything. Lab work done at [facility]. States will be leaving after an hour to go to hospital and asking to call prior to her leaving. Thanks!

## 2018-11-21 NOTE — TELEPHONE ENCOUNTER
I called the waiting room at [facility] and spoke with the wife and let her know that we got her message, and though our doctors don't go there, they will be in good hands, and most of the doctors know how to get in touch with each other if needed.      She will call when he is discharged for follow up.

## 2019-02-15 ENCOUNTER — OFFICE VISIT (OUTPATIENT)
Dept: UROLOGY | Facility: CLINIC | Age: 69
End: 2019-02-15
Payer: COMMERCIAL

## 2019-02-15 VITALS
BODY MASS INDEX: 23.23 KG/M2 | HEART RATE: 64 BPM | HEIGHT: 67 IN | SYSTOLIC BLOOD PRESSURE: 124 MMHG | WEIGHT: 148 LBS | DIASTOLIC BLOOD PRESSURE: 74 MMHG

## 2019-02-15 DIAGNOSIS — R33.9 URINARY RETENTION: Primary | ICD-10-CM

## 2019-02-15 PROCEDURE — 3078F DIAST BP <80 MM HG: CPT | Mod: CPTII,S$GLB,, | Performed by: UROLOGY

## 2019-02-15 PROCEDURE — 1101F PR PT FALLS ASSESS DOC 0-1 FALLS W/OUT INJ PAST YR: ICD-10-PCS | Mod: CPTII,S$GLB,, | Performed by: UROLOGY

## 2019-02-15 PROCEDURE — 3078F PR MOST RECENT DIASTOLIC BLOOD PRESSURE < 80 MM HG: ICD-10-PCS | Mod: CPTII,S$GLB,, | Performed by: UROLOGY

## 2019-02-15 PROCEDURE — 1101F PT FALLS ASSESS-DOCD LE1/YR: CPT | Mod: CPTII,S$GLB,, | Performed by: UROLOGY

## 2019-02-15 PROCEDURE — 99999 PR PBB SHADOW E&M-EST. PATIENT-LVL III: CPT | Mod: PBBFAC,,, | Performed by: UROLOGY

## 2019-02-15 PROCEDURE — 99999 PR PBB SHADOW E&M-EST. PATIENT-LVL III: ICD-10-PCS | Mod: PBBFAC,,, | Performed by: UROLOGY

## 2019-02-15 PROCEDURE — 99203 OFFICE O/P NEW LOW 30 MIN: CPT | Mod: S$GLB,,, | Performed by: UROLOGY

## 2019-02-15 PROCEDURE — 3074F SYST BP LT 130 MM HG: CPT | Mod: CPTII,S$GLB,, | Performed by: UROLOGY

## 2019-02-15 PROCEDURE — 3074F PR MOST RECENT SYSTOLIC BLOOD PRESSURE < 130 MM HG: ICD-10-PCS | Mod: CPTII,S$GLB,, | Performed by: UROLOGY

## 2019-02-15 PROCEDURE — 99203 PR OFFICE/OUTPT VISIT, NEW, LEVL III, 30-44 MIN: ICD-10-PCS | Mod: S$GLB,,, | Performed by: UROLOGY

## 2019-02-15 RX ORDER — TAMSULOSIN HYDROCHLORIDE 0.4 MG/1
0.4 CAPSULE ORAL DAILY
Qty: 30 CAPSULE | Refills: 11 | Status: SHIPPED | OUTPATIENT
Start: 2019-02-15 | End: 2019-06-24

## 2019-02-15 NOTE — PROGRESS NOTES
Removed 16fr orellana catheter, provided voiding trial instructions, pt expressed understanding and tolerated well

## 2019-02-15 NOTE — PROGRESS NOTES
Subjective:       Patient ID: Brandon GABRIEL Cousin is a 68 y.o. male.    Chief Complaint: Urinary Retention    HPI     68 year old with urinary retention.  He was hospitalized for last year and has been in and out of the hospital for 3 months.  He was primarily being treatment for pneumonia and heart problems.  This catheter was placed 3 months ago and details are unknown.  Patient says it hasn't been changed in 3 months.  He says he has never had problem voiding.  He denies hematuria and dysuria.      Past Medical History:   Diagnosis Date    Anticoagulant long-term use     Arthritis     Blood transfusion     Carotid artery disease     CHF (congestive heart failure)     CKD (chronic kidney disease) stage 3, GFR 30-59 ml/min     Coronary artery disease     Diabetes mellitus     Diabetes mellitus, type 2     Hx of BKA, right     Hyperlipidemia     Hypertension     Myocardial infarction     PAD (peripheral artery disease)     Renal disorder     Stroke     Thyroid disease      Past Surgical History:   Procedure Laterality Date    AMPUTATION Right     BKA    Angiogram Extremity Unilateral Right 9/13/2018    Performed by [facility] at [facility] CATH    CARDIAC SURGERY      CABG    CATARACT EXTRACTION      COLONOSCOPY N/A 10/16/2016    [facility] by [facility] at [facility] ENDO    CORONARY ARTERY BYPASS GRAFT  2000    GASTROSTOMY TUBE PLACEMENT      Aug 2017- PATIENT DENIES    HIP SURGERY N/A     with hardware, 2016, pt unsure which hip- PATIENT DENIES    LEG AMPUTATION THROUGH LOWER TIBIA AND FIBULA      right leg - prosthesis in place    ORIF, HIP, USING DYNAMIC HIP SCREW N/A 1/25/2013    Performed by Trung [facility]endez [facility] at [facility] OR    PTA, PERIPHERAL BLOOD VESSEL Right 9/13/2018    Performed by [facility] at [facility] CATH    stents  left leg       Current Outpatient Medications:     amiodarone (PACERONE) 200 MG Tab, Take 200 mg by mouth once daily., Disp: , Rfl:     amitriptyline  (ELAVIL) 25 MG tablet, Take 25 mg by mouth every evening. , Disp: , Rfl:     amlodipine (NORVASC) 10 MG tablet, Take 1 tablet (10 mg total) by mouth once daily. (Patient taking differently: Take 15 mg by mouth once daily. ), Disp: 30 tablet, Rfl: 3    apixaban 5 mg Tab, Take 5 mg by mouth 2 (two) times daily., Disp: , Rfl:     aspirin (ECOTRIN) 81 MG EC tablet, Take 81 mg by mouth once daily., Disp: , Rfl:     atorvastatin (LIPITOR) 40 MG tablet, Take 40 mg by mouth nightly. , Disp: , Rfl:     buPROPion (WELLBUTRIN) 100 MG tablet, Take 100 mg by mouth once daily., Disp: , Rfl:     cholecalciferol, vitamin D3, 2,000 unit Tab, Take 2,000 Units by mouth 2 (two) times daily. D3, Disp: , Rfl:     clopidogrel (PLAVIX) 75 mg tablet, Take 75 mg by mouth once daily.  , Disp: , Rfl:     CONTOUR NEXT STRIPS Strp, test SIX TIMES DAILY, Disp: , Rfl: 0    insulin detemir U-100 (LEVEMIR FLEXTOUCH) 100 unit/mL (3 mL) SubQ InPn pen, Inject 9 Units into the skin 2 (two) times daily. (Patient taking differently: Inject 20 Units into the skin 2 (two) times daily. ), Disp: , Rfl: 0    insulin glargine (LANTUS) 100 unit/mL injection, Inject 22 Units into the skin every evening., Disp: , Rfl:     isosorbide mononitrate (IMDUR) 30 MG 24 hr tablet, Take 30 mg by mouth once daily.  , Disp: , Rfl:     levothyroxine (SYNTHROID) 25 MCG tablet, Take 25 mcg by mouth once daily., Disp: , Rfl:     lisinopril 10 MG tablet, Take 10 mg by mouth once daily., Disp: , Rfl:     metoprolol succinate (TOPROL-XL) 100 MG 24 hr tablet, Take 100 mg by mouth 2 (two) times daily. , Disp: , Rfl:     NIFEdipine (ADALAT CC) 30 MG TbSR, Take 30 mg by mouth once daily., Disp: , Rfl:     NOVOLOG U-100 INSULIN ASPART 100 unit/mL injection, Inject 7 Units into the skin 3 (three) times daily with meals. Per sliding scale (Patient taking differently: Inject 7 Units into the skin 3 (three) times daily with meals. VIA PUMP), Disp: 10 mL, Rfl: 3    omeprazole  (PRILOSEC) 20 MG capsule, Take 20 mg by mouth once daily., Disp: , Rfl:     pyridoxine, vitamin B6, (VITAMIN B-6) 100 MG Tab, Take 100 mg by mouth once daily. , Disp: , Rfl:     valsartan (DIOVAN) 320 MG tablet, Take 320 mg by mouth once daily., Disp: , Rfl:     tamsulosin (FLOMAX) 0.4 mg Cap, Take 1 capsule (0.4 mg total) by mouth once daily., Disp: 30 capsule, Rfl: 11    Review of Systems   Constitutional: Negative for fever.   Eyes: Negative for visual disturbance.   Respiratory: Negative for shortness of breath.    Cardiovascular: Negative for chest pain.   Gastrointestinal: Negative for nausea.   Genitourinary: Negative for hematuria.   Musculoskeletal: Positive for gait problem.   Skin: Negative for rash.   Neurological: Negative for seizures.   Psychiatric/Behavioral: Negative for confusion.       Objective:      Physical Exam   Constitutional: He is oriented to person, place, and time. He appears well-developed and well-nourished.   HENT:   Head: Normocephalic and atraumatic.   Eyes: Conjunctivae are normal.   Cardiovascular: Normal rate.   Pulmonary/Chest: Effort normal.   Genitourinary:   Genitourinary Comments: Ríos in place.  Urine is clear   Musculoskeletal: Normal range of motion. He exhibits no edema.   Neurological: He is alert and oriented to person, place, and time.   Skin: Skin is warm and dry. No rash noted.   Psychiatric: He has a normal mood and affect.   Vitals reviewed.      Assessment:       1. Urinary retention        Plan:       Urinary retention    Other orders  -     tamsulosin (FLOMAX) 0.4 mg Cap; Take 1 capsule (0.4 mg total) by mouth once daily.  Dispense: 30 capsule; Refill: 11      Ríos out today for voiding trial.  He understands if he is unable to void this weekend, he will need to go to ER.  Otherwise, RTC next week for PVR

## 2019-02-19 ENCOUNTER — OFFICE VISIT (OUTPATIENT)
Dept: CARDIOLOGY | Facility: CLINIC | Age: 69
End: 2019-02-19
Payer: COMMERCIAL

## 2019-02-19 ENCOUNTER — TELEPHONE (OUTPATIENT)
Dept: CARDIOLOGY | Facility: CLINIC | Age: 69
End: 2019-02-19

## 2019-02-19 VITALS
WEIGHT: 144.38 LBS | BODY MASS INDEX: 22.66 KG/M2 | DIASTOLIC BLOOD PRESSURE: 53 MMHG | SYSTOLIC BLOOD PRESSURE: 118 MMHG | HEART RATE: 53 BPM | HEIGHT: 67 IN

## 2019-02-19 DIAGNOSIS — I50.42 CHRONIC COMBINED SYSTOLIC AND DIASTOLIC HEART FAILURE: ICD-10-CM

## 2019-02-19 DIAGNOSIS — I25.10 CORONARY ARTERY DISEASE, ANGINA PRESENCE UNSPECIFIED, UNSPECIFIED VESSEL OR LESION TYPE, UNSPECIFIED WHETHER NATIVE OR TRANSPLANTED HEART: ICD-10-CM

## 2019-02-19 DIAGNOSIS — Z95.1 HX OF CABG: Primary | ICD-10-CM

## 2019-02-19 DIAGNOSIS — I73.9 PVD (PERIPHERAL VASCULAR DISEASE): ICD-10-CM

## 2019-02-19 DIAGNOSIS — I10 ESSENTIAL HYPERTENSION: ICD-10-CM

## 2019-02-19 DIAGNOSIS — N18.30 CKD (CHRONIC KIDNEY DISEASE) STAGE 3, GFR 30-59 ML/MIN: ICD-10-CM

## 2019-02-19 PROCEDURE — 99999 PR PBB SHADOW E&M-EST. PATIENT-LVL III: ICD-10-PCS | Mod: PBBFAC,,, | Performed by: INTERNAL MEDICINE

## 2019-02-19 PROCEDURE — 99214 OFFICE O/P EST MOD 30 MIN: CPT | Mod: S$GLB,,, | Performed by: INTERNAL MEDICINE

## 2019-02-19 PROCEDURE — 99214 PR OFFICE/OUTPT VISIT, EST, LEVL IV, 30-39 MIN: ICD-10-PCS | Mod: S$GLB,,, | Performed by: INTERNAL MEDICINE

## 2019-02-19 PROCEDURE — 3074F SYST BP LT 130 MM HG: CPT | Mod: CPTII,S$GLB,, | Performed by: INTERNAL MEDICINE

## 2019-02-19 PROCEDURE — 1101F PR PT FALLS ASSESS DOC 0-1 FALLS W/OUT INJ PAST YR: ICD-10-PCS | Mod: CPTII,S$GLB,, | Performed by: INTERNAL MEDICINE

## 2019-02-19 PROCEDURE — 3078F PR MOST RECENT DIASTOLIC BLOOD PRESSURE < 80 MM HG: ICD-10-PCS | Mod: CPTII,S$GLB,, | Performed by: INTERNAL MEDICINE

## 2019-02-19 PROCEDURE — 3074F PR MOST RECENT SYSTOLIC BLOOD PRESSURE < 130 MM HG: ICD-10-PCS | Mod: CPTII,S$GLB,, | Performed by: INTERNAL MEDICINE

## 2019-02-19 PROCEDURE — 3078F DIAST BP <80 MM HG: CPT | Mod: CPTII,S$GLB,, | Performed by: INTERNAL MEDICINE

## 2019-02-19 PROCEDURE — 1101F PT FALLS ASSESS-DOCD LE1/YR: CPT | Mod: CPTII,S$GLB,, | Performed by: INTERNAL MEDICINE

## 2019-02-19 PROCEDURE — 99999 PR PBB SHADOW E&M-EST. PATIENT-LVL III: CPT | Mod: PBBFAC,,, | Performed by: INTERNAL MEDICINE

## 2019-02-19 NOTE — TELEPHONE ENCOUNTER
----- Message from Sadaf Guillermo sent at 2/19/2019  9:15 AM CST -----  Contact: Elvia  Type: Needs Medical Advice    Who Called:  Patient's wife Elvia  Symptoms (please be specific):    How long has patient had these symptoms:    Pharmacy name and phone #:    Best Call Back Number: [phone]  Additional Information: called to advise that may be about ten minutes late for today's appointment

## 2019-02-19 NOTE — TELEPHONE ENCOUNTER
----- Message from Hector Mcdonald sent at 2/19/2019  9:10 AM CST -----  Contact: pt wife hope  Pt is requesting to reschedule their appointment.    Date of current appointment: 2.19.19  Reason for reschedule: transportation issues for today  First Available: 3.14.19  Additional information: pt is requesting Thursday 2.21.19    Call back: [phone]  thanks

## 2019-02-19 NOTE — PROGRESS NOTES
Subjective:    Patient ID:  Brandon GABRIEL Cousin is a 68 y.o. male who presents for evaluation of Carotid Artery Disease (would like to see if he can get a pace maker); Coronary Artery Disease; and Shortness of Breath (has been monitoring oxygen levels. got out of hospital two weeks ago)      HPI  He comes to get established  Formerly seen by Dr Jaramillo in [place] as well as dr longo in [place] for PAD  He has extensive CAD hx with prior CABG in 2000, several PCI's, right BKA  Currently no major issues  FC II-III    Review of Systems   Constitution: Negative for decreased appetite, weakness, malaise/fatigue, weight gain and weight loss.   Cardiovascular: Negative for chest pain, dyspnea on exertion, leg swelling, palpitations and syncope.   Respiratory: Positive for shortness of breath. Negative for cough.    Gastrointestinal: Negative.    All other systems reviewed and are negative.       Objective:      Physical Exam   Constitutional: He is oriented to person, place, and time. He appears well-developed and well-nourished.   HENT:   Head: Normocephalic.   Eyes: Pupils are equal, round, and reactive to light.   Neck: Normal range of motion. Neck supple. No JVD present. Carotid bruit is not present. No thyromegaly present.   Cardiovascular: Normal rate, regular rhythm, normal heart sounds, intact distal pulses and normal pulses. PMI is not displaced. Exam reveals no gallop.   No murmur heard.  Pulmonary/Chest: Effort normal and breath sounds normal.   Abdominal: Soft. Normal appearance. He exhibits no mass. There is no hepatosplenomegaly. There is no tenderness.   Musculoskeletal: Normal range of motion. He exhibits no edema.   Neurological: He is alert and oriented to person, place, and time. He has normal strength and normal reflexes. No sensory deficit.   Skin: Skin is warm and intact.   Psychiatric: He has a normal mood and affect.   Nursing note and vitals reviewed.        Assessment:       1. Hx of CABG, 2000    2.  Coronary artery disease, angina presence unspecified, unspecified vessel or lesion type, unspecified whether native or transplanted heart    3. PVD (peripheral vascular disease)    4. Essential hypertension    5. Chronic combined systolic and diastolic heart failure    6. CKD (chronic kidney disease) stage 3, GFR 30-59 ml/min         Plan:     Continue all cardiac medications  Echocardiogram  Call with results  6 m f/u

## 2019-02-21 ENCOUNTER — OFFICE VISIT (OUTPATIENT)
Dept: UROLOGY | Facility: CLINIC | Age: 69
End: 2019-02-21
Payer: COMMERCIAL

## 2019-02-21 VITALS
BODY MASS INDEX: 23.54 KG/M2 | SYSTOLIC BLOOD PRESSURE: 140 MMHG | HEART RATE: 59 BPM | WEIGHT: 150 LBS | DIASTOLIC BLOOD PRESSURE: 63 MMHG | HEIGHT: 67 IN

## 2019-02-21 DIAGNOSIS — Z12.5 SCREENING FOR PROSTATE CANCER: ICD-10-CM

## 2019-02-21 DIAGNOSIS — N40.0 BENIGN PROSTATIC HYPERPLASIA, UNSPECIFIED WHETHER LOWER URINARY TRACT SYMPTOMS PRESENT: Primary | ICD-10-CM

## 2019-02-21 DIAGNOSIS — Z80.42 FAMILY HISTORY OF PROSTATE CANCER: ICD-10-CM

## 2019-02-21 LAB — POC RESIDUAL URINE VOLUME: 110 ML (ref 0–100)

## 2019-02-21 PROCEDURE — 99213 PR OFFICE/OUTPT VISIT, EST, LEVL III, 20-29 MIN: ICD-10-PCS | Mod: S$GLB,,, | Performed by: UROLOGY

## 2019-02-21 PROCEDURE — 51798 US URINE CAPACITY MEASURE: CPT | Mod: S$GLB,,, | Performed by: UROLOGY

## 2019-02-21 PROCEDURE — 51798 POCT BLADDER SCAN: ICD-10-PCS | Mod: S$GLB,,, | Performed by: UROLOGY

## 2019-02-21 PROCEDURE — 99999 PR PBB SHADOW E&M-EST. PATIENT-LVL III: CPT | Mod: PBBFAC,,, | Performed by: UROLOGY

## 2019-02-21 PROCEDURE — 3077F PR MOST RECENT SYSTOLIC BLOOD PRESSURE >= 140 MM HG: ICD-10-PCS | Mod: CPTII,S$GLB,, | Performed by: UROLOGY

## 2019-02-21 PROCEDURE — 99213 OFFICE O/P EST LOW 20 MIN: CPT | Mod: S$GLB,,, | Performed by: UROLOGY

## 2019-02-21 PROCEDURE — 1101F PR PT FALLS ASSESS DOC 0-1 FALLS W/OUT INJ PAST YR: ICD-10-PCS | Mod: CPTII,S$GLB,, | Performed by: UROLOGY

## 2019-02-21 PROCEDURE — 3077F SYST BP >= 140 MM HG: CPT | Mod: CPTII,S$GLB,, | Performed by: UROLOGY

## 2019-02-21 PROCEDURE — 3078F PR MOST RECENT DIASTOLIC BLOOD PRESSURE < 80 MM HG: ICD-10-PCS | Mod: CPTII,S$GLB,, | Performed by: UROLOGY

## 2019-02-21 PROCEDURE — 3078F DIAST BP <80 MM HG: CPT | Mod: CPTII,S$GLB,, | Performed by: UROLOGY

## 2019-02-21 PROCEDURE — 99999 PR PBB SHADOW E&M-EST. PATIENT-LVL III: ICD-10-PCS | Mod: PBBFAC,,, | Performed by: UROLOGY

## 2019-02-21 PROCEDURE — 1101F PT FALLS ASSESS-DOCD LE1/YR: CPT | Mod: CPTII,S$GLB,, | Performed by: UROLOGY

## 2019-02-21 RX ORDER — GLUCAGON 1 MG
VIAL (EA) INJECTION
Refills: 0 | COMMUNITY
Start: 2019-02-07 | End: 2020-02-03

## 2019-02-21 RX ORDER — FUROSEMIDE 40 MG/1
TABLET ORAL
Refills: 1 | COMMUNITY
Start: 2019-02-07 | End: 2020-02-03

## 2019-02-21 RX ORDER — PANTOPRAZOLE SODIUM 40 MG/1
40 TABLET, DELAYED RELEASE ORAL EVERY MORNING
Refills: 1 | COMMUNITY
Start: 2019-02-07 | End: 2020-02-03

## 2019-02-21 RX ORDER — DOXYCYCLINE HYCLATE 100 MG
100 TABLET ORAL 2 TIMES DAILY
Refills: 1 | COMMUNITY
Start: 2019-02-07 | End: 2019-06-24

## 2019-02-21 RX ORDER — MONTELUKAST SODIUM 10 MG/1
10 TABLET ORAL NIGHTLY
Refills: 1 | COMMUNITY
Start: 2019-02-07 | End: 2019-08-08

## 2019-02-21 NOTE — PROGRESS NOTES
Subjective:       Patient ID: Brandon GABRIEL Cousin is a 68 y.o. male.    Chief Complaint: Follow-up (urinary retention with PVR)    HPI     68 year old with urinary retention.  Ríos removed and he is now voiding without difficulty.   ml.  He also has a family history of prostate cancer.  2 brothers were treated for prostate cancer.  He has no recent PSA.    Review of Systems   Constitutional: Negative for fever.   Genitourinary: Negative for dysuria and hematuria.       Objective:      Physical Exam   Constitutional: He is oriented to person, place, and time. He appears well-developed and well-nourished.   Pulmonary/Chest: Effort normal.   Abdominal: Soft.   Genitourinary: Enlarged: 30g, s/s/a.   Neurological: He is alert and oriented to person, place, and time.   Skin: No rash noted.   Psychiatric: He has a normal mood and affect.   Vitals reviewed.      Assessment:       1. Benign prostatic hyperplasia, unspecified whether lower urinary tract symptoms present    2. Family history of prostate cancer    3. Screening for prostate cancer        Plan:       Benign prostatic hyperplasia, unspecified whether lower urinary tract symptoms present  -     POCT Bladder Scan    Family history of prostate cancer    Screening for prostate cancer  -     PSA, Screening; Future; Expected date: 02/21/2019      Get PSA but wait a couple more weeks given recent catheterization.  Continue Flomax.

## 2019-02-21 NOTE — PROGRESS NOTES
Patient arrive for uroflow and PVR. Performed PVR on patient, residual was 110 ml. Report residual results to MD.

## 2019-02-26 ENCOUNTER — CLINICAL SUPPORT (OUTPATIENT)
Dept: CARDIOLOGY | Facility: CLINIC | Age: 69
End: 2019-02-26
Attending: INTERNAL MEDICINE
Payer: COMMERCIAL

## 2019-02-26 VITALS
HEIGHT: 67 IN | WEIGHT: 150 LBS | DIASTOLIC BLOOD PRESSURE: 30 MMHG | SYSTOLIC BLOOD PRESSURE: 130 MMHG | BODY MASS INDEX: 23.54 KG/M2

## 2019-02-26 DIAGNOSIS — Z95.1 HX OF CABG: ICD-10-CM

## 2019-02-26 DIAGNOSIS — I50.42 CHRONIC COMBINED SYSTOLIC AND DIASTOLIC HEART FAILURE: ICD-10-CM

## 2019-02-26 DIAGNOSIS — I25.10 CORONARY ARTERY DISEASE, ANGINA PRESENCE UNSPECIFIED, UNSPECIFIED VESSEL OR LESION TYPE, UNSPECIFIED WHETHER NATIVE OR TRANSPLANTED HEART: ICD-10-CM

## 2019-02-26 DIAGNOSIS — I73.9 PVD (PERIPHERAL VASCULAR DISEASE): ICD-10-CM

## 2019-02-26 DIAGNOSIS — N18.30 CKD (CHRONIC KIDNEY DISEASE) STAGE 3, GFR 30-59 ML/MIN: ICD-10-CM

## 2019-02-26 DIAGNOSIS — I10 ESSENTIAL HYPERTENSION: ICD-10-CM

## 2019-02-26 PROCEDURE — 93306 TRANSTHORACIC ECHO (TTE) COMPLETE (CUPID ONLY): ICD-10-PCS | Mod: S$GLB,,, | Performed by: INTERNAL MEDICINE

## 2019-02-26 PROCEDURE — 99999 PR PBB SHADOW E&M-EST. PATIENT-LVL I: CPT | Mod: PBBFAC,,,

## 2019-02-26 PROCEDURE — 93306 TTE W/DOPPLER COMPLETE: CPT | Mod: S$GLB,,, | Performed by: INTERNAL MEDICINE

## 2019-02-26 PROCEDURE — 99999 PR PBB SHADOW E&M-EST. PATIENT-LVL I: ICD-10-PCS | Mod: PBBFAC,,,

## 2019-02-28 LAB
ASCENDING AORTA: 2.7 CM
AV INDEX (PROSTH): 0.94
AV MEAN GRADIENT: 1.76 MMHG
AV PEAK GRADIENT: 3.1 MMHG
AV VALVE AREA: 3.05 CM2
AV VELOCITY RATIO: 0.97
BSA FOR ECHO PROCEDURE: 1.79 M2
CV ECHO LV RWT: 0.3 CM
DOP CALC AO PEAK VEL: 0.88 M/S
DOP CALC AO VTI: 20.33 CM
DOP CALC LVOT AREA: 3.23 CM2
DOP CALC LVOT DIAMETER: 2.03 CM
DOP CALC LVOT PEAK VEL: 0.85 M/S
DOP CALC LVOT STROKE VOLUME: 62.01 CM3
DOP CALCLVOT PEAK VEL VTI: 19.17 CM
E WAVE DECELERATION TIME: 191.13 MSEC
E/A RATIO: 1.72
E/E' RATIO: 26.8
ECHO LV POSTERIOR WALL: 0.77 CM (ref 0.6–1.1)
FRACTIONAL SHORTENING: 26 % (ref 28–44)
INTERVENTRICULAR SEPTUM: 0.95 CM (ref 0.6–1.1)
LA MAJOR: 5.18 CM
LA MINOR: 4.34 CM
LA WIDTH: 4.51 CM
LEFT ATRIUM SIZE: 4.36 CM
LEFT ATRIUM VOLUME INDEX: 44.1 ML/M2
LEFT ATRIUM VOLUME: 78.94 CM3
LEFT INTERNAL DIMENSION IN SYSTOLE: 3.77 CM (ref 2.1–4)
LEFT VENTRICLE DIASTOLIC VOLUME INDEX: 68.28 ML/M2
LEFT VENTRICLE DIASTOLIC VOLUME: 122.17 ML
LEFT VENTRICLE MASS INDEX: 85.3 G/M2
LEFT VENTRICLE SYSTOLIC VOLUME INDEX: 33.9 ML/M2
LEFT VENTRICLE SYSTOLIC VOLUME: 60.67 ML
LEFT VENTRICULAR INTERNAL DIMENSION IN DIASTOLE: 5.07 CM (ref 3.5–6)
LEFT VENTRICULAR MASS: 152.63 G
LV LATERAL E/E' RATIO: 26.8
LV SEPTAL E/E' RATIO: 26.8
MV PEAK A VEL: 0.78 M/S
MV PEAK E VEL: 1.34 M/S
PISA TR MAX VEL: 1.82 M/S
PULM VEIN S/D RATIO: 0.76
PV PEAK D VEL: 0.51 M/S
PV PEAK S VEL: 0.39 M/S
RA MAJOR: 4.03 CM
RA PRESSURE: 3 MMHG
RA WIDTH: 3.46 CM
RV TISSUE DOPPLER FREE WALL SYSTOLIC VELOCITY 1 (APICAL 4 CHAMBER VIEW): 6.49 M/S
SINUS: 2.91 CM
STJ: 3.1 CM
TDI LATERAL: 0.05
TDI SEPTAL: 0.05
TDI: 0.05
TR MAX PG: 13.25 MMHG
TRICUSPID ANNULAR PLANE SYSTOLIC EXCURSION: 1.39 CM
TV REST PULMONARY ARTERY PRESSURE: 16 MMHG

## 2019-04-12 DIAGNOSIS — Z76.82 ORGAN TRANSPLANT CANDIDATE: Primary | ICD-10-CM

## 2019-04-17 ENCOUNTER — TELEPHONE (OUTPATIENT)
Dept: ENDOCRINOLOGY | Facility: CLINIC | Age: 69
End: 2019-04-17

## 2019-04-17 NOTE — TELEPHONE ENCOUNTER
----- Message from Guerita Berkowitz sent at 4/17/2019 10:11 AM CDT -----  Contact: Patient's wife Elvia  Type:  Sooner Apoointment Request    Caller is requesting a sooner appointment.  Caller declined first available appointment listed below.  Caller will not accept being placed on the waitlist and is requesting a message be sent to doctor.    Name of Caller:  Elvia  When is the first available appointment? 6/10/19  Symptoms:  Est care  Best Call Back Number:

## 2019-04-17 NOTE — TELEPHONE ENCOUNTER
Pt scheduled for New Pt appt on 4/26/19 at 8:00am  Will bring blood sugar logs and recent labs from [facility]

## 2019-04-18 ENCOUNTER — TELEPHONE (OUTPATIENT)
Dept: TRANSPLANT | Facility: CLINIC | Age: 69
End: 2019-04-18

## 2019-04-24 ENCOUNTER — TELEPHONE (OUTPATIENT)
Dept: CARDIOLOGY | Facility: CLINIC | Age: 69
End: 2019-04-24

## 2019-04-24 NOTE — TELEPHONE ENCOUNTER
Cardiac clearance: patient has right arm basilic vein graft under general anesthesia. He is taking plavix

## 2019-05-22 ENCOUNTER — TELEPHONE (OUTPATIENT)
Dept: ADMINISTRATIVE | Facility: CLINIC | Age: 69
End: 2019-05-22

## 2019-05-22 NOTE — TELEPHONE ENCOUNTER
Home Health SOC 02/08/2019 - 04/08/2019 with [facility] ([place] - Dr. Feng Nino. SN, PT and OT services.

## 2019-06-26 PROBLEM — N18.6 END STAGE RENAL DISEASE: Status: ACTIVE | Noted: 2019-06-26

## 2019-07-01 ENCOUNTER — TELEPHONE (OUTPATIENT)
Dept: TRANSPLANT | Facility: CLINIC | Age: 69
End: 2019-07-01

## 2019-08-08 ENCOUNTER — OFFICE VISIT (OUTPATIENT)
Dept: TRANSPLANT | Facility: CLINIC | Age: 69
End: 2019-08-08
Payer: COMMERCIAL

## 2019-08-08 VITALS
RESPIRATION RATE: 18 BRPM | DIASTOLIC BLOOD PRESSURE: 55 MMHG | WEIGHT: 149.94 LBS | TEMPERATURE: 98 F | OXYGEN SATURATION: 100 % | HEART RATE: 54 BPM | HEIGHT: 66 IN | SYSTOLIC BLOOD PRESSURE: 140 MMHG | BODY MASS INDEX: 24.1 KG/M2

## 2019-08-08 DIAGNOSIS — I25.10 CORONARY ARTERY DISEASE, ANGINA PRESENCE UNSPECIFIED, UNSPECIFIED VESSEL OR LESION TYPE, UNSPECIFIED WHETHER NATIVE OR TRANSPLANTED HEART: ICD-10-CM

## 2019-08-08 DIAGNOSIS — F10.90 ALCOHOL INTAKE ABOVE RECOMMENDED SENSIBLE LIMITS: ICD-10-CM

## 2019-08-08 DIAGNOSIS — N18.6 END STAGE RENAL DISEASE: ICD-10-CM

## 2019-08-08 DIAGNOSIS — I10 ESSENTIAL HYPERTENSION: ICD-10-CM

## 2019-08-08 DIAGNOSIS — I73.9 PAD (PERIPHERAL ARTERY DISEASE): ICD-10-CM

## 2019-08-08 DIAGNOSIS — I73.9 CLAUDICATION: ICD-10-CM

## 2019-08-08 DIAGNOSIS — I61.2: ICD-10-CM

## 2019-08-08 DIAGNOSIS — D62 ANEMIA DUE TO ACUTE BLOOD LOSS: Primary | ICD-10-CM

## 2019-08-08 DIAGNOSIS — Z95.1 HX OF CABG: ICD-10-CM

## 2019-08-08 PROCEDURE — 99205 PR OFFICE/OUTPT VISIT, NEW, LEVL V, 60-74 MIN: ICD-10-PCS | Mod: S$GLB,,, | Performed by: INTERNAL MEDICINE

## 2019-08-08 PROCEDURE — 99205 OFFICE O/P NEW HI 60 MIN: CPT | Mod: S$GLB,,, | Performed by: INTERNAL MEDICINE

## 2019-08-08 PROCEDURE — 1101F PR PT FALLS ASSESS DOC 0-1 FALLS W/OUT INJ PAST YR: ICD-10-PCS | Mod: CPTII,S$GLB,, | Performed by: INTERNAL MEDICINE

## 2019-08-08 PROCEDURE — 99999 PR PBB SHADOW E&M-EST. PATIENT-LVL IV: CPT | Mod: PBBFAC,TXP,, | Performed by: INTERNAL MEDICINE

## 2019-08-08 PROCEDURE — 1101F PT FALLS ASSESS-DOCD LE1/YR: CPT | Mod: CPTII,S$GLB,, | Performed by: INTERNAL MEDICINE

## 2019-08-08 PROCEDURE — 99999 PR PBB SHADOW E&M-EST. PATIENT-LVL IV: ICD-10-PCS | Mod: PBBFAC,TXP,, | Performed by: INTERNAL MEDICINE

## 2019-08-08 RX ORDER — FERROUS GLUCONATE 324(38)MG
324 TABLET ORAL 2 TIMES DAILY
COMMUNITY
End: 2020-02-03

## 2019-08-08 RX ORDER — CLONIDINE HYDROCHLORIDE 0.1 MG/1
0.1 TABLET ORAL 2 TIMES DAILY
COMMUNITY
End: 2020-02-03

## 2019-08-08 RX ORDER — TAMSULOSIN HYDROCHLORIDE 0.4 MG/1
0.4 CAPSULE ORAL DAILY
COMMUNITY
End: 2020-02-03

## 2019-08-08 RX ORDER — BISACODYL 5 MG
5 TABLET, DELAYED RELEASE (ENTERIC COATED) ORAL DAILY PRN
COMMUNITY
End: 2020-02-03 | Stop reason: ALTCHOICE

## 2019-08-08 NOTE — PROGRESS NOTES
TRANSPLANT RECIPIENT EVALUATION    Requesting Physician: Dr. Gorge CASTANO       CC:   Initial evaluation of kidney transplant candidacy.    HPI:   Cousin is a 69 y.o. year old Black or  male who has presented to be evaluated as a potential kidney transplant recipient.  He has ESRD secondary to diabetic nephropathy and HTN. Patient is currently on hemodialysis started in 2018. Patient is dialyzing on MWF schedule.  Patient reports that he is tolerating dialysis well.. He has a RUE AV fistula for dialysis access. He dialyzes for 2 hours and 30 minutes and will not have hypotension.    His PMH is significant for DM X 20 years, HTN; CAD s/p CABG; PAD s/p interventions to bilateral lower extremities and a right BKA in 2002 , 75% stenosis in the right mid superficial femoral artery and 50-75% stenosis in the right common femoral artery and right distal superficial femoral artery s/p PTA to the RSFA; GI bleeding in June 2019; hip surgery ( unknown side); CVA 2017 s/p short memory loss;  wheelchair dependent, had PEG tube for 6 months s/p removal in late 2018. He was in hospital for 4 months in 2018 while he was started on dialysis, had PEG tube in place and   Patient denies any history of  chronic obstructive pulmonary disease, liver disease, gallstones, deep venous thrombosis, pulmonary embolism,  or malignancies.  he complains of weakness, fatigue. No nausea, vomiting, diarrhea, cough, fever, chills, weight loss or night sweats. he has sensory loss, numbness or paresthesias.     Functional Status: He is not able to walk without walker for few feet. any symptoms of chest pain, SOB, claudication.   Previous Transplant: no  Previous Blood Transfusion: yes  Potential Donor: no  High KDPI candidate: yes  Meets center eligibility for accepting HCV+ donor offer: yes  Patient educated on HCV+ donors. is willing to accept HCV+ donor offer: yes      Past Medical History:  Past Medical History:    Diagnosis Date    Anticoagulant long-term use     Arthritis     Bleeding ulcer     Blood transfusion     Carotid artery disease     CHF (congestive heart failure)     CKD (chronic kidney disease) stage 3, GFR 30-59 ml/min     Coronary artery disease     Diabetes mellitus     Diabetes mellitus, type 2     Hx of BKA, right     prosthetic leg    Hyperlipidemia     Hypertension     Myocardial infarction     Oxygen dependent     1L NC    PAD (peripheral artery disease)     Renal disorder     Short-term memory loss     Stroke     short term memory loss    Thyroid disease        Past Surgical History:  Past Surgical History:   Procedure Laterality Date    AMPUTATION Right     BKA    Angiogram Extremity Unilateral Right 9/13/2018    Performed [facility] CATH    CARDIAC SURGERY  2000    CABG    CATARACT EXTRACTION      COLONOSCOPY N/A 10/16/2016    [facility] ENDO    CORONARY ARTERY BYPASS GRAFT  2000    CREATION, AV FISTULA- Graft  Right 6/26/2019    [facility]u [facility]pp [facility] OR    GASTROSTOMY TUBE PLACEMENT      Aug 2017- PATIENT DENIES    HIP SURGERY N/A     with hardware, 2016, pt unsure which hip- PATIENT DENIES    LEG AMPUTATION THROUGH LOWER TIBIA AND FIBULA      right leg - prosthesis in place    ORIF, HIP, USING DYNAMIC HIP SCREW N/A 1/25/2013    Performed by [facility]ung [facility]endez [facility] at [facility] OR    PTA, PERIPHERAL BLOOD VESSEL Right 9/13/2018    Performed by [facility] at [facility] CATH    stents  left leg         Family History:  Family History   Problem Relation Age of Onset    Asthma Mother     COPD Mother     Stroke Father     Hypertension Father     Diabetes Sister     Heart disease Sister     Kidney disease Brother     Diabetes Brother     No Known Problems Brother     No Known Problems Brother     Hypertension Sister     No Known Problems Sister        Social History:  Social History     Socioeconomic  History    Marital status:      Spouse name: Not on file    Number of children: 3    Years of education: Not on file    Highest education level: Not on file   Occupational History    Occupation: disabled   Social Needs    Financial resource strain: Not on file    Food insecurity:     Worry: Not on file     Inability: Not on file    Transportation needs:     Medical: Not on file     Non-medical: Not on file   Tobacco Use    Smoking status: Former Smoker     Packs/day: 1.00     Years: 46.00     Pack years: 46.00     Types: Cigarettes     Last attempt to quit:      Years since quittin.6    Smokeless tobacco: Never Used    Tobacco comment: started smoking again 2018- 5-6 CIGS PER DAY   Substance and Sexual Activity    Alcohol use: No     Comment: NOT ANY MORE, he used to be alcoholic for years    Drug use: No    Sexual activity: Not Currently   Lifestyle    Physical activity:     Days per week: Not on file     Minutes per session: Not on file    Stress: Not on file   Relationships    Social connections:     Talks on phone: Not on file     Gets together: Not on file     Attends Confucianist service: Not on file     Active member of club or organization: Not on file     Attends meetings of clubs or organizations: Not on file     Relationship status: Not on file   Other Topics Concern    Not on file   Social History Narrative    Not on file           Current Medication  Current Outpatient Medications   Medication Sig Dispense Refill    amiodarone (PACERONE) 200 MG Tab Take 200 mg by mouth 2 (two) times daily.       amitriptyline (ELAVIL) 25 MG tablet Take 25 mg by mouth every evening.       amlodipine (NORVASC) 10 MG tablet Take 1 tablet (10 mg total) by mouth once daily. (Patient taking differently: Take 5 mg by mouth once daily. ) 30 tablet 3    atorvastatin (LIPITOR) 80 MG tablet Take 80 mg by mouth nightly.       bisacodyl (DULCOLAX) 5 mg EC tablet Take 5 mg by mouth daily as  needed for Constipation.      chlorhexidine (PERIDEX) 0.12 % solution Use as directed 10 mLs in the mouth or throat 2 (two) times daily.      cholecalciferol, vitamin D3, 2,000 unit Tab Take 1,000 Units by mouth 2 (two) times daily. D3      cloNIDine (CATAPRES) 0.1 MG tablet Take 0.1 mg by mouth 2 (two) times daily.      CONTOUR NEXT STRIPS Strp test SIX TIMES DAILY  0    ferrous gluconate (FERGON) 324 MG tablet Take 324 mg by mouth 2 (two) times daily.      furosemide (LASIX) 40 MG tablet 20mg by mouth daily  1    GLUCAGON EMERGENCY KIT, HUMAN, 1 mg injection USE AS DIRECTED  0    insulin detemir U-100 (LEVEMIR FLEXTOUCH) 100 unit/mL (3 mL) SubQ InPn pen Inject 9 Units into the skin 2 (two) times daily. (Patient taking differently: Inject 9 Units into the skin once daily. )  0    levothyroxine (SYNTHROID) 25 MCG tablet Take 25 mcg by mouth once daily.      losartan (COZAAR) 100 MG tablet Take 50 mg by mouth once daily.       metoprolol succinate (TOPROL-XL) 100 MG 24 hr tablet Take 100 mg by mouth 2 (two) times daily.       minoxidil (LONITEN) 2.5 MG tablet Take 2.5 mg by mouth 2 (two) times daily.      mupirocin calcium 2% nasl oint (BACTROBAN) 2 % Oint by Nasal route 2 (two) times daily.      NOVOLOG U-100 INSULIN ASPART 100 unit/mL injection Inject 7 Units into the skin 3 (three) times daily with meals. Per sliding scale (Patient taking differently: Inject into the skin as needed (sliding scale). ) 10 mL 3    pantoprazole (PROTONIX) 40 MG tablet Take 40 mg by mouth every morning.  1    sucralfate (CARAFATE) 1 gram tablet Take 1 g by mouth 4 (four) times daily before meals and nightly.      tamsulosin (FLOMAX) 0.4 mg Cap Take 0.4 mg by mouth once daily.       No current facility-administered medications for this visit.      Facility-Administered Medications Ordered in Other Visits   Medication Dose Route Frequency Provider Last Rate Last Dose    0.9%  NaCl infusion  20 mL/hr Intravenous  Continuous José Miguel Urena MD 20 mL/hr at 06/26/19 0830 20 mL/hr at 06/26/19 0830    mupirocin 2 % ointment   Nasal On Call Procedure Ramu Tripp MD           Allergy:       Review of Systems    Constitutional: + appetite change and fatigue.   HENT: Negative for hearing loss, sore throat and mouth sores.   Eyes: Negative for photophobia, pain and visual disturbance.   Respiratory: Negative for cough, chest tightness, shortness of breath and wheezing.   Cardiovascular: Negative for chest pain, palpitations and leg swelling.   Gastrointestinal: Negative for nausea, vomiting, abdominal pain, diarrhea, constipation, blood in stool and abdominal distention.   Musculoskeletal: + right BKA, + gait problem, wheelchair dependent   Skin: right BKA  Neurological: + weakness, light-headedness, + memory loss   Hematological: Negative for adenopathy. Does not bruise/bleed easily.   Psychiatric/Behavioral: + sleep disturbance and dysphoric mood. The patient is nervous/anxious. He follows with psychiatrist      OBJECTIVE       Body mass index is 23.89 kg/m².    Vitals:    08/08/19 0726   BP: (!) 140/55   Pulse: (!) 54   Resp: 18   Temp: 97.9 °F (36.6 °C)       Physical Exam    General: No acute distress, well groomed  HEENT: moist mucous membranes, no oral ulcerations/lesions   Neck: Supple, symmetrical, trachea midline, no masses  Respiratory: Clear to auscultation bilaterally, respirations unlabored, no rales  Cardiovacular: Regular rate and rhythm, S1, S2 normal, + Scar on mid chest  Gastrointestinal: Soft, non-tender, bowel sounds normal,   Extremities: right BKA  Skin: inrtact  Neurologic: + focal neurologic deficits. alert and oriented x 2  Musculoskeletal: right BKA, has prosthesis,  FROM, 3/5 strength, on wheelchair  Psychiatric: Does not responds appropriately to questions due to short term memory loss        Labs: Reviewed with the patient    ASSESSMENT     1. Anemia due to acute blood loss    2. End stage renal  disease    3. Coronary artery disease, angina presence unspecified, unspecified vessel or lesion type, unspecified whether native or transplanted heart    4. Claudication    5. Essential hypertension    6. Hx of CABG, 2000    7. PAD (peripheral artery disease)    8. Alcohol intake above recommended sensible limits    9. History of Left-sided intracerebral hemorrhage        PLAN     Transplant Candidacy:   After obtaining history and performing physical exam as well as reviewing available diagnostic studies, Mr. Simmons is an unacceptable kidney transplant candidate.    Discuss the case at the selection committee.

## 2019-08-08 NOTE — LETTER
August 8, 2019        Shahram Pennington  [address] [facility] [address]  Phone: [phone]  Fax: [phone]             [facility]  [address]  [facility] [address]  Phone: [phone]   Patient: Brandon Simmons   MR Number: [id number]   YOB: 1950   Date of Visit: 8/8/2019       Dear Dr. Shahram Pennington    Thank you for referring Brandon Simmons to me for evaluation. Attached you will find relevant portions of my assessment and plan of care.    If you have questions, please do not hesitate to call me. I look forward to following Brandon Simmons along with you.    Sincerely,    Jamaica Veloz MD    Enclosure    If you would like to receive this communication electronically, please contact [email or web address] or [phone] to request SiteJabber Link access.    SiteJ[employer] Link is a tool which provides read-only access to select patient information with whom you have a relationship. Its easy to use and provides real time access to review your patients record including encounter summaries, notes, results, and demographic information.    If you feel you have received this communication in error or would no longer like to receive these types of communications, please e-mail [email or web address]

## 2019-08-08 NOTE — PROGRESS NOTES
INITIAL PATIENT EDUCATION NOTE    Mr. Brandon Simmons was seen in pre-kidney transplant clinic for evaluation for kidney, kidney/pancreas or pancreas only transplant.  The patient attended a group education session that discussed/reviewed the following aspects of transplantation: evaluation and selection committee process, UNOS waitlist management/multiple listings, types of organs offered (KDPI < 85%, KDPI > 85%, PHS increased risk, DCD, HCV+), financial aspects, surgical procedures, dietary instruction pre- and post-transplant, health maintenance pre- and post-transplant, post-transplant hospitalization and outpatient follow-up, potential to participate in a research protocol, and medication management and side effects.  A question and answer session was provided after the presentation.    The patient was seen by all members of the multi-disciplinary team to include: Nephrologist/PA, Surgeon, , Transplant Coordinator, , Pharmacist and Dietician (if applicable).    The patient reviewed and signed all consents for evaluation which were witnessed and sent to scanning into the EPIC chart.    The patient was given an education book and plan for further evaluation based on his individual assessment.      The patient was encouraged to call with any questions or concerns.

## 2019-08-08 NOTE — PROGRESS NOTES
PHARM.D. PRE-TRANSPLANT NOTE:    This patient's medication therapy was evaluated as part of his pre-transplant evaluation.      The following general pharmacologic concerns were noted: None    The following pharmacologic concerns related to HCV therapy were noted: Patient currently on amiodarone.      This patient's medication profile was reviewed for contraindications for DAA Hepatitis C therapy:    [X]  No current inducers of CYP 3A4 or PGP  [YES]  No amiodarone on this patient's EMR profile in the last 24 months  [YES]  No past or current atrial fibrillation on this patient's EMR profile       Current Outpatient Medications   Medication Sig Dispense Refill    amiodarone (PACERONE) 200 MG Tab Take 200 mg by mouth 2 (two) times daily.       amitriptyline (ELAVIL) 25 MG tablet Take 25 mg by mouth every evening.       amlodipine (NORVASC) 10 MG tablet Take 1 tablet (10 mg total) by mouth once daily. (Patient taking differently: Take 5 mg by mouth once daily. ) 30 tablet 3    atorvastatin (LIPITOR) 80 MG tablet Take 80 mg by mouth nightly.       bisacodyl (DULCOLAX) 5 mg EC tablet Take 5 mg by mouth daily as needed for Constipation.      chlorhexidine (PERIDEX) 0.12 % solution Use as directed 10 mLs in the mouth or throat 2 (two) times daily.      cholecalciferol, vitamin D3, 2,000 unit Tab Take 1,000 Units by mouth 2 (two) times daily. D3      cloNIDine (CATAPRES) 0.1 MG tablet Take 0.1 mg by mouth 2 (two) times daily.      CONTOUR NEXT STRIPS Strp test SIX TIMES DAILY  0    ferrous gluconate (FERGON) 324 MG tablet Take 324 mg by mouth 2 (two) times daily.      furosemide (LASIX) 40 MG tablet 20mg by mouth daily  1    GLUCAGON EMERGENCY KIT, HUMAN, 1 mg injection USE AS DIRECTED  0    insulin detemir U-100 (LEVEMIR FLEXTOUCH) 100 unit/mL (3 mL) SubQ InPn pen Inject 9 Units into the skin 2 (two) times daily. (Patient taking differently: Inject 9 Units into the skin once daily. )  0    levothyroxine  (SYNTHROID) 25 MCG tablet Take 25 mcg by mouth once daily.      losartan (COZAAR) 100 MG tablet Take 50 mg by mouth once daily.       metoprolol succinate (TOPROL-XL) 100 MG 24 hr tablet Take 100 mg by mouth 2 (two) times daily.       minoxidil (LONITEN) 2.5 MG tablet Take 2.5 mg by mouth 2 (two) times daily.      mupirocin calcium 2% nasl oint (BACTROBAN) 2 % Oint by Nasal route 2 (two) times daily.      NOVOLOG U-100 INSULIN ASPART 100 unit/mL injection Inject 7 Units into the skin 3 (three) times daily with meals. Per sliding scale (Patient taking differently: Inject into the skin as needed (sliding scale). ) 10 mL 3    pantoprazole (PROTONIX) 40 MG tablet Take 40 mg by mouth every morning.  1    sucralfate (CARAFATE) 1 gram tablet Take 1 g by mouth 4 (four) times daily before meals and nightly.      tamsulosin (FLOMAX) 0.4 mg Cap Take 0.4 mg by mouth once daily.       No current facility-administered medications for this visit.      Facility-Administered Medications Ordered in Other Visits   Medication Dose Route Frequency Provider Last Rate Last Dose    0.9%  NaCl infusion  20 mL/hr Intravenous Continuous José Miguel Urena MD 20 mL/hr at 06/26/19 0830 20 mL/hr at 06/26/19 0830    mupirocin 2 % ointment   Nasal On Call Procedure Ramu Tripp MD             Currently the patient's wife is responsible for preparing / administering this patient's medications on a daily basis.  I am available for consultation and can be contacted, as needed by the other members of the Kidney Transplant team.

## 2019-08-09 ENCOUNTER — COMMITTEE REVIEW (OUTPATIENT)
Dept: TRANSPLANT | Facility: CLINIC | Age: 69
End: 2019-08-09

## 2019-08-09 NOTE — COMMITTEE REVIEW
Native Organ Dx: Diabetes Mellitus - Type II      Not approved for LRD/CAD transplant due to cardiovascular disease, cerebralvascular disease, and limited functional capacity.      Note written by Veronika Green RN    ===============================================    I was present at the meeting and attest to the decision of the committee

## 2019-08-09 NOTE — LETTER
August 9, 2019    Brandon Simmons  [address] [address]  [place] [address]      Dear Brandon Simmons:  MRN: [id number]    It is the duty of the [facility] Selection Committee to determine which patients are candidates for a transplant. For this reason, our committee has the difficult task of evaluating patients to determine which ones have the greatest chance of having a successful transplant. We are aware of the magnitude of this responsibility, and we approach it with reverence and humility.    It is with regret I inform you that you are not approved as a transplant candidate due to cardiovascular disease, cerebralvascular disease, and limited functional capacity.  Based on this review, we have determined that at this time, you are not a candidate for a transplant at [facility].      The selection committee carefully considers each patient's transplant candidacy and determines whether it is safe to proceed with transplantation on a case-by-case basis using established selection criteria.  At present, the risk of proceeding with an elective transplant surgery has become too high.                                                                               Although the selection committee believes you are not a suitable transplant candidate, you have the option to be evaluated at other transplant centers who may have different selection criteria.  You may request your [facility] records be sent to any center of your choice by contacting our Medical Records Department at [phone].                                                                               Attached is a letter from the United Network for Organ Sharing (UNOS).  It describes the services and information offered to patients by UNOS and the Organ Procurement and Transplant Network.    The [facility] Selection Committee sincerely wishes you the best and remains available to answer any questions.  Please do not hesitate to contact our pre-transplant  office if we can assist you in any other way.                                                                               Sincerely,      Melia Moraes MD  Medical Director, Kidney & Kidney/Pancreas Transplantation  lh    Encl: UNOS Letter  F[facility]  [facility]          OPTN/UNOS: Your Resource for Organ Transplant Information        If you have a question regarding your own medical care, you always should call your transplant center first. However, for general organ transplant-related information, you can call the United Network for Organ Sharing (UNOS) toll-free patient services line at [phone].    Anyone, including potential transplant candidates, recipients, family members/friends, living donors, and/or donor family members can call this number to:    · talk about organ donation, living donation, how transplant and donation work, the donation process, transplant policies, and transplant/donor information;  · get a free patient information kit with helpful booklets, waiting list and transplant information, and a list of all transplant centers;  · ask questions about the Organ Procurement and Transplantation Network (OPTN) web site (www.optn.transplant.hrsa.gov); the UNOS Web site (www.unos.org); or the UNOS web site for living donors and transplant recipients (www.transplantliving.org);  · learn how UNOS and the OPTN can help you;  · talk about any concerns that you may have with a transplant center and how they perform    UNOS is a not-for-profit organization that provides all of the administrative services for the national OPTN under federal contract to the Health Resources and Services Administration (HRSA), an agency under the U.S. Department of Health and Human Services (HHS).     UNOS and OPTN responsibilities include:    · writing educational material for patients, the public and professionals;  · helping to make people aware of  the need for donated organs and tissue;  · writing organ transplant policy with help from doctors, nurses, transplant patients/candidates, donor families, living donors, and the public;  · coordinating the organ matching and placement process;  · collecting information about every organ transplant and donation that occurs in the [place].    Remember, you should contact your transplant center directly if you have questions or concerns about your own medical care including medical records, work-up progress and test reports. [facility] is not your transplant center, and staff at [facility] will not be able to transfer you to your transplant center, so keep your transplant centers phone number handy. But, while you research your transplant needs and learn as much as you can about transplantation and donation, we welcome your call to our toll-free patient services line at [phone].

## 2020-01-01 ENCOUNTER — OFFICE VISIT (OUTPATIENT)
Dept: PHYSICAL MEDICINE AND REHAB | Facility: CLINIC | Age: 70
End: 2020-01-01
Payer: COMMERCIAL

## 2020-01-01 ENCOUNTER — HOSPITAL ENCOUNTER (OUTPATIENT)
Dept: RADIOLOGY | Facility: HOSPITAL | Age: 70
Discharge: HOME | End: 2020-12-21
Attending: ORTHOPAEDIC SURGERY
Payer: COMMERCIAL

## 2020-01-01 ENCOUNTER — OFFICE VISIT (OUTPATIENT)
Dept: ORTHOPEDICS | Facility: CLINIC | Age: 70
End: 2020-01-01
Payer: COMMERCIAL

## 2020-01-01 VITALS — HEIGHT: 67 IN | WEIGHT: 146 LBS | BODY MASS INDEX: 22.91 KG/M2

## 2020-01-01 VITALS
SYSTOLIC BLOOD PRESSURE: 141 MMHG | DIASTOLIC BLOOD PRESSURE: 67 MMHG | HEART RATE: 101 BPM | BODY MASS INDEX: 22.91 KG/M2 | HEIGHT: 67 IN | WEIGHT: 146 LBS

## 2020-01-01 DIAGNOSIS — G89.29 CHRONIC PAIN OF BOTH SHOULDERS: Primary | ICD-10-CM

## 2020-01-01 DIAGNOSIS — M75.40 IMPINGEMENT SYNDROME OF SHOULDER REGION, UNSPECIFIED LATERALITY: ICD-10-CM

## 2020-01-01 DIAGNOSIS — I10 ESSENTIAL HYPERTENSION: ICD-10-CM

## 2020-01-01 DIAGNOSIS — M25.511 ACUTE PAIN OF BOTH SHOULDERS: Primary | ICD-10-CM

## 2020-01-01 DIAGNOSIS — I73.9 PVD (PERIPHERAL VASCULAR DISEASE): ICD-10-CM

## 2020-01-01 DIAGNOSIS — M25.512 BILATERAL SHOULDER PAIN, UNSPECIFIED CHRONICITY: ICD-10-CM

## 2020-01-01 DIAGNOSIS — M25.512 BILATERAL SHOULDER PAIN, UNSPECIFIED CHRONICITY: Primary | ICD-10-CM

## 2020-01-01 DIAGNOSIS — E10.42 DIABETIC POLYNEUROPATHY ASSOCIATED WITH TYPE 1 DIABETES MELLITUS: ICD-10-CM

## 2020-01-01 DIAGNOSIS — Z74.09 IMPAIRED FUNCTIONAL MOBILITY AND ACTIVITY TOLERANCE: ICD-10-CM

## 2020-01-01 DIAGNOSIS — N18.6 END STAGE RENAL DISEASE: ICD-10-CM

## 2020-01-01 DIAGNOSIS — Z89.519 S/P BKA (BELOW KNEE AMPUTATION) UNILATERAL: ICD-10-CM

## 2020-01-01 DIAGNOSIS — M67.919 TENDINOPATHY OF ROTATOR CUFF, UNSPECIFIED LATERALITY: ICD-10-CM

## 2020-01-01 DIAGNOSIS — M25.512 ACUTE PAIN OF BOTH SHOULDERS: Primary | ICD-10-CM

## 2020-01-01 DIAGNOSIS — M25.512 CHRONIC PAIN OF BOTH SHOULDERS: Primary | ICD-10-CM

## 2020-01-01 DIAGNOSIS — M25.511 BILATERAL SHOULDER PAIN, UNSPECIFIED CHRONICITY: Primary | ICD-10-CM

## 2020-01-01 DIAGNOSIS — M25.511 BILATERAL SHOULDER PAIN, UNSPECIFIED CHRONICITY: ICD-10-CM

## 2020-01-01 DIAGNOSIS — M25.511 CHRONIC PAIN OF BOTH SHOULDERS: Primary | ICD-10-CM

## 2020-01-01 PROCEDURE — 20610 DRAIN/INJ JOINT/BURSA W/O US: CPT | Mod: 50,S$GLB,, | Performed by: ORTHOPAEDIC SURGERY

## 2020-01-01 PROCEDURE — 99999 PR PBB SHADOW E&M-EST. PATIENT-LVL IV: ICD-10-PCS | Mod: PBBFAC,,, | Performed by: ORTHOPAEDIC SURGERY

## 2020-01-01 PROCEDURE — 99999 PR PBB SHADOW E&M-EST. PATIENT-LVL III: ICD-10-PCS | Mod: PBBFAC,,, | Performed by: PHYSICAL MEDICINE & REHABILITATION

## 2020-01-01 PROCEDURE — 3288F FALL RISK ASSESSMENT DOCD: CPT | Mod: CPTII,S$GLB,, | Performed by: ORTHOPAEDIC SURGERY

## 2020-01-01 PROCEDURE — 99999 PR PBB SHADOW E&M-EST. PATIENT-LVL IV: CPT | Mod: PBBFAC,,, | Performed by: ORTHOPAEDIC SURGERY

## 2020-01-01 PROCEDURE — 1125F AMNT PAIN NOTED PAIN PRSNT: CPT | Mod: S$GLB,,, | Performed by: ORTHOPAEDIC SURGERY

## 2020-01-01 PROCEDURE — 1159F MED LIST DOCD IN RCRD: CPT | Mod: S$GLB,,, | Performed by: PHYSICAL MEDICINE & REHABILITATION

## 2020-01-01 PROCEDURE — 20610 LARGE JOINT ASPIRATION/INJECTION: BILATERAL SUBACROMIAL BURSA: ICD-10-PCS | Mod: 50,S$GLB,, | Performed by: ORTHOPAEDIC SURGERY

## 2020-01-01 PROCEDURE — 3008F BODY MASS INDEX DOCD: CPT | Mod: CPTII,S$GLB,, | Performed by: ORTHOPAEDIC SURGERY

## 2020-01-01 PROCEDURE — 1101F PR PT FALLS ASSESS DOC 0-1 FALLS W/OUT INJ PAST YR: ICD-10-PCS | Mod: CPTII,S$GLB,, | Performed by: PHYSICAL MEDICINE & REHABILITATION

## 2020-01-01 PROCEDURE — 1125F AMNT PAIN NOTED PAIN PRSNT: CPT | Mod: S$GLB,,, | Performed by: PHYSICAL MEDICINE & REHABILITATION

## 2020-01-01 PROCEDURE — 1125F PR PAIN SEVERITY QUANTIFIED, PAIN PRESENT: ICD-10-PCS | Mod: S$GLB,,, | Performed by: ORTHOPAEDIC SURGERY

## 2020-01-01 PROCEDURE — 3288F PR FALLS RISK ASSESSMENT DOCUMENTED: ICD-10-PCS | Mod: CPTII,S$GLB,, | Performed by: ORTHOPAEDIC SURGERY

## 2020-01-01 PROCEDURE — 3008F BODY MASS INDEX DOCD: CPT | Mod: CPTII,S$GLB,, | Performed by: PHYSICAL MEDICINE & REHABILITATION

## 2020-01-01 PROCEDURE — 1125F PR PAIN SEVERITY QUANTIFIED, PAIN PRESENT: ICD-10-PCS | Mod: S$GLB,,, | Performed by: PHYSICAL MEDICINE & REHABILITATION

## 2020-01-01 PROCEDURE — 73030 XR SHOULDER TRAUMA 3 VIEW BILATERAL: ICD-10-PCS | Mod: 26,,, | Performed by: RADIOLOGY

## 2020-01-01 PROCEDURE — 3008F PR BODY MASS INDEX (BMI) DOCUMENTED: ICD-10-PCS | Mod: CPTII,S$GLB,, | Performed by: PHYSICAL MEDICINE & REHABILITATION

## 2020-01-01 PROCEDURE — 1101F PT FALLS ASSESS-DOCD LE1/YR: CPT | Mod: CPTII,S$GLB,, | Performed by: ORTHOPAEDIC SURGERY

## 2020-01-01 PROCEDURE — 73030 X-RAY EXAM OF SHOULDER: CPT | Mod: 26,,, | Performed by: RADIOLOGY

## 2020-01-01 PROCEDURE — 1159F MED LIST DOCD IN RCRD: CPT | Mod: S$GLB,,, | Performed by: ORTHOPAEDIC SURGERY

## 2020-01-01 PROCEDURE — 1159F PR MEDICATION LIST DOCUMENTED IN MEDICAL RECORD: ICD-10-PCS | Mod: S$GLB,,, | Performed by: PHYSICAL MEDICINE & REHABILITATION

## 2020-01-01 PROCEDURE — 99203 PR OFFICE/OUTPT VISIT, NEW, LEVL III, 30-44 MIN: ICD-10-PCS | Mod: 25,S$GLB,, | Performed by: ORTHOPAEDIC SURGERY

## 2020-01-01 PROCEDURE — 99204 OFFICE O/P NEW MOD 45 MIN: CPT | Mod: S$GLB,,, | Performed by: PHYSICAL MEDICINE & REHABILITATION

## 2020-01-01 PROCEDURE — 3008F PR BODY MASS INDEX (BMI) DOCUMENTED: ICD-10-PCS | Mod: CPTII,S$GLB,, | Performed by: ORTHOPAEDIC SURGERY

## 2020-01-01 PROCEDURE — 99203 OFFICE O/P NEW LOW 30 MIN: CPT | Mod: 25,S$GLB,, | Performed by: ORTHOPAEDIC SURGERY

## 2020-01-01 PROCEDURE — 1101F PR PT FALLS ASSESS DOC 0-1 FALLS W/OUT INJ PAST YR: ICD-10-PCS | Mod: CPTII,S$GLB,, | Performed by: ORTHOPAEDIC SURGERY

## 2020-01-01 PROCEDURE — 99204 PR OFFICE/OUTPT VISIT, NEW, LEVL IV, 45-59 MIN: ICD-10-PCS | Mod: S$GLB,,, | Performed by: PHYSICAL MEDICINE & REHABILITATION

## 2020-01-01 PROCEDURE — 1159F PR MEDICATION LIST DOCUMENTED IN MEDICAL RECORD: ICD-10-PCS | Mod: S$GLB,,, | Performed by: ORTHOPAEDIC SURGERY

## 2020-01-01 PROCEDURE — 99999 PR PBB SHADOW E&M-EST. PATIENT-LVL III: CPT | Mod: PBBFAC,,, | Performed by: PHYSICAL MEDICINE & REHABILITATION

## 2020-01-01 PROCEDURE — 1101F PT FALLS ASSESS-DOCD LE1/YR: CPT | Mod: CPTII,S$GLB,, | Performed by: PHYSICAL MEDICINE & REHABILITATION

## 2020-01-01 PROCEDURE — 76881 US COMPL JOINT R-T W/IMG: CPT | Mod: S$GLB,,, | Performed by: PHYSICAL MEDICINE & REHABILITATION

## 2020-01-01 PROCEDURE — 76881 PR US EXTREMITY OR AXILLA, TISSUE/MUSCLE/JOINT/NERVE; COMPLETE: ICD-10-PCS | Mod: S$GLB,,, | Performed by: PHYSICAL MEDICINE & REHABILITATION

## 2020-01-01 PROCEDURE — 3288F PR FALLS RISK ASSESSMENT DOCUMENTED: ICD-10-PCS | Mod: CPTII,S$GLB,, | Performed by: PHYSICAL MEDICINE & REHABILITATION

## 2020-01-01 PROCEDURE — 3288F FALL RISK ASSESSMENT DOCD: CPT | Mod: CPTII,S$GLB,, | Performed by: PHYSICAL MEDICINE & REHABILITATION

## 2020-01-01 PROCEDURE — 73030 X-RAY EXAM OF SHOULDER: CPT | Mod: TC,50,PN

## 2020-01-01 RX ORDER — TRIAMCINOLONE ACETONIDE 40 MG/ML
20 INJECTION, SUSPENSION INTRA-ARTICULAR; INTRAMUSCULAR
Status: DISCONTINUED | OUTPATIENT
Start: 2020-01-01 | End: 2020-01-01 | Stop reason: HOSPADM

## 2020-01-01 RX ORDER — DICLOFENAC SODIUM 10 MG/G
2 GEL TOPICAL DAILY
Qty: 1 TUBE | Refills: 0 | Status: SHIPPED | OUTPATIENT
Start: 2020-01-01 | End: 2021-01-01

## 2020-01-01 RX ORDER — TAMSULOSIN HYDROCHLORIDE 0.4 MG/1
CAPSULE ORAL
Qty: 30 CAPSULE | Refills: 11 | Status: SHIPPED | OUTPATIENT
Start: 2020-01-01 | End: 2021-01-01 | Stop reason: DRUGHIGH

## 2020-01-01 RX ADMIN — TRIAMCINOLONE ACETONIDE 20 MG: 40 INJECTION, SUSPENSION INTRA-ARTICULAR; INTRAMUSCULAR at 09:12

## 2020-02-20 PROBLEM — I73.9 PERIPHERAL ARTERIAL DISEASE: Status: ACTIVE | Noted: 2020-01-01

## 2020-12-23 NOTE — PROCEDURES
Large Joint Aspiration/Injection: bilateral subacromial bursa    Date/Time: 12/21/2020 9:00 AM  Performed by: Bruce Ramírez MD  Authorized by: Bruce Ramírez MD     Consent Done?:  Yes (Verbal)  Indications:  Pain  Timeout: prior to procedure the correct patient, procedure, and site was verified    Approach:  Lateral  Location:  Shoulder  Laterality:  Bilateral  Site:  Bilateral subacromial bursa  Medications (Right):  20 mg triamcinolone acetonide 40 mg/mL  Medications (Left):  20 mg triamcinolone acetonide 40 mg/mL

## 2020-12-23 NOTE — PROGRESS NOTES
Past Medical History:   Diagnosis Date    Anticoagulant long-term use     Arthritis     Bleeding ulcer     Blood transfusion     Carotid artery disease     CHF (congestive heart failure)     CKD (chronic kidney disease) stage 3, GFR 30-59 ml/min     Coronary artery disease     Diabetes mellitus     Diabetes mellitus, type 2     Hx of BKA, right     prosthetic leg    Hyperlipidemia     Hypertension     Myocardial infarction     Oxygen dependent     1L NC    PAD (peripheral artery disease)     Renal disorder     Short-term memory loss     Stroke     short term memory loss    Thyroid disease        Past Surgical History:   Procedure Laterality Date    AMPUTATION Right     BKA    ANGIOGRAPHY OF LOWER EXTREMITY Right 9/13/2018    Procedure: Angiogram Extremity Unilateral;  Surgeon: Hernando Fuentes MD;  Location: [facility] CATH;  Service: Cardiology;  Laterality: Right;    AV FISTULA PLACEMENT Right 6/26/2019    Procedure: CREATION, AV FISTULA- Graft ;  Surgeon: Ramu Tripp MD;  Location: [facility] OR;  Service: Vascular;  Laterality: Right;    CARDIAC SURGERY  2000    CABG    CATARACT EXTRACTION      COLONOSCOPY N/A 10/16/2016    Procedure: COLONOSCOPY;  Surgeon: Jamar White MD;  Location: [facility] ENDO;  Service: Endoscopy;  Laterality: N/A;    CORONARY ARTERY BYPASS GRAFT  2000    GASTROSTOMY TUBE PLACEMENT      Aug 2017- PATIENT DENIES    HIP SURGERY N/A     with hardware, 2016, pt unsure which hip- PATIENT DENIES    LEG AMPUTATION THROUGH LOWER TIBIA AND FIBULA      right leg - prosthesis in place    PERCUTANEOUS TRANSLUMINAL ANGIOPLASTY (PTA) OF PERIPHERAL VESSEL Right 9/13/2018    Procedure: PTA, PERIPHERAL BLOOD VESSEL;  Surgeon: Hernando Fuentes MD;  Location: [facility] CATH;  Service: Cardiology;  Laterality: Right;    PERCUTANEOUS TRANSLUMINAL ANGIOPLASTY (PTA) OF PERIPHERAL VESSEL N/A 2/20/2020    Procedure: PTA, PERIPHERAL VESSEL;  Surgeon: Hernando Fuentes MD;  Location: [facility] CATH;   Service: Cardiology;  Laterality: N/A;    stents  left leg       Current Outpatient Medications   Medication Sig    amiodarone (PACERONE) 200 MG Tab Take 200 mg by mouth once daily.    amitriptyline (ELAVIL) 25 MG tablet Take 25 mg by mouth every evening.     amLODIPine (NORVASC) 10 MG tablet Take 15 mg by mouth once daily.    apixaban (ELIQUIS) 5 mg Tab Take 5 mg by mouth 2 (two) times daily.    aspirin (ECOTRIN) 81 MG EC tablet Take 81 mg by mouth once daily.    atorvastatin (LIPITOR) 40 MG tablet Take 40 mg by mouth every evening.    buPROPion (WELLBUTRIN SR) 100 MG TBSR 12 hr tablet Take 100 mg by mouth once daily.    cholecalciferol, vitamin D3, 2,000 unit Tab Take 1 tablet by mouth 2 (two) times daily. D3    cilostazoL (PLETAL) 50 MG Tab Take 50 mg by mouth 2 (two) times daily.    clopidogreL (PLAVIX) 75 mg tablet Take 75 mg by mouth once daily.    HYDROcodone-acetaminophen (NORCO) 7.5-325 mg per tablet Take 1 tablet by mouth every 12 (twelve) hours as needed for Pain.    insulin detemir U-100 (LEVEMIR FLEXTOUCH) 100 unit/mL (3 mL) SubQ InPn pen Inject 20 Units into the skin 2 (two) times daily.    insulin glargine (LANTUS) 100 unit/mL injection Inject 22 Units into the skin every evening.    isosorbide mononitrate (IMDUR) 30 MG 24 hr tablet Take 30 mg by mouth once daily.    levothyroxine (SYNTHROID) 25 MCG tablet Take 25 mcg by mouth once daily.    lisinopril 10 MG tablet Take 10 mg by mouth once daily.    metoprolol succinate (TOPROL-XL) 100 MG 24 hr tablet Take 100 mg by mouth 2 (two) times daily.     NIFEdipine (ADALAT CC) 30 MG TbSR Take 30 mg by mouth once daily.    NOVOLOG U-100 INSULIN ASPART 100 unit/mL injection Inject 7 Units into the skin 3 (three) times daily with meals. Per sliding scale (Patient taking differently: Inject into the skin as needed (sliding scale). )    omeprazole (PRILOSEC) 20 MG capsule Take 20 mg by mouth once daily.    pyridoxine, vitamin B6, (B-6) 100 MG  Tab Take 1 tablet by mouth once daily.    tamsulosin (FLOMAX) 0.4 mg Cap TAKE 1 CAPSULE BY MOUTH EVERY DAY    valsartan (DIOVAN) 320 MG tablet Take 320 mg by mouth once daily.    diclofenac sodium (VOLTAREN ARTHRITIS PAIN) 1 % Gel Apply 2 g topically once daily.     No current facility-administered medications for this visit.      Facility-Administered Medications Ordered in Other Visits   Medication    0.9%  NaCl infusion    mupirocin 2 % ointment       Review of patient's allergies indicates:   Allergen Reactions    Budesonide Rash and Other (See Comments)     Unknown    Cobalt Rash and Hives     Other reaction(s): Other (See Comments)  Unknown       Family History   Problem Relation Age of Onset    Asthma Mother     COPD Mother     Stroke Father     Hypertension Father     Diabetes Sister     Heart disease Sister     Kidney disease Brother     Diabetes Brother     No Known Problems Brother     No Known Problems Brother     Hypertension Sister     No Known Problems Sister        Social History     Socioeconomic History    Marital status:      Spouse name: Not on file    Number of children: 3    Years of education: Not on file    Highest education level: Not on file   Occupational History    Occupation: disabled   Social Needs    Financial resource strain: Not on file    Food insecurity     Worry: Not on file     Inability: Not on file    Transportation needs     Medical: Not on file     Non-medical: Not on file   Tobacco Use    Smoking status: Former Smoker     Packs/day: 1.00     Years: 46.00     Pack years: 46.00     Types: Cigarettes     Quit date: 2017     Years since quitting: 3.9    Smokeless tobacco: Never Used    Tobacco comment: started smoking again April 2018- 5-6 CIGS PER DAY   Substance and Sexual Activity    Alcohol use: No     Comment: NOT ANY MORE, he used to be alcoholic for years    Drug use: No    Sexual activity: Not Currently   Lifestyle    Physical  "activity     Days per week: Not on file     Minutes per session: Not on file    Stress: Not on file   Relationships    Social connections     Talks on phone: Not on file     Gets together: Not on file     Attends [employer] service: Not on file     Active member of club or organization: Not on file     Attends meetings of clubs or organizations: Not on file     Relationship status: Not on file   Other Topics Concern    Not on file   Social History Narrative    Not on file       Chief Complaint:   Chief Complaint   Patient presents with    Right Shoulder - Pain    Left Shoulder - Pain       Consulting Physician: Self, Aaareferral    History of present illness:    This is a 70 y.o. year old male who complains of bilateral shoulder pain.  He states that he woke up in November and had pain in both shoulders.  He denies any fall or injury.  He states that the pain is worse with reaching overhead and movement.  Puts his pain at a 10/10.  He has a history of a CVA in 2018.  He is on dialysis.  He reports that he is taking Norco and is not helping.  He states the right is worse than the left.    Review of Systems:    Constitution:   Denies chills, fever, and sweats.  HENT:   Denies headaches or blurry vision.  Cardiovascular:  Denies chest pain or irregular heart beat.  Respiratory:   Denies cough or shortness of breath.  Gastrointestinal:  Denies abdominal pain, nausea, or vomiting.  Musculoskeletal:   Denies muscle cramps.  Neurological:   Denies dizziness or focal weakness.  Psychiatric/Behavior: Normal mental status.  Hematology/Lymph:  Denies bleeding problem or easy bruising/bleeding.  Skin:    Denies rash or suspicious lesions.    Examination:    Vital Signs:    Vitals:    12/21/20 0845   Weight: 66.2 kg (146 lb)   Height: 5' 7" (1.702 m)   PainSc: 10-Worst pain ever       Body mass index is 22.87 kg/m².    Constitution:   Well-developed, well nourished patient in no acute distress.  Neurological:   Alert and " oriented x 3 and cooperative to examination.     Psychiatric/Behavior: Normal mental status.  Respiratory:   No shortness of breath.  Eyes:    Extraoccular muscles intact  Skin:    No scars, rash or suspicious lesions.    Physical Exam: Right Shoulder Exam     Skin  Rash:   None  Scars:   None    Inspection/Observation   Swelling:   none  Erythema:   none  Bruising:   none  Wounds:   none  Scapular Winging:  none  Scapular Dyskinesia:  none  Atrophy:   none  Masses:   None  Lymphadenopathy: None    Tenderness   AC Joint:   mild    Range of Motion   Active Forward Flexion:  120  Active External Rotation:  10  Active Internal Rotation:  Hip    Passive Forward Flexion:  160  Passive External Rotation:  30    Muscle Strength   Forward Flexion:  4/5  External Rotation:  4+/5  Internal Rotation:  4+/5    Tests & Signs   Cross Arm:   positive  Impingement:   positive    Other   Sensation:   normal  Pulse:    2+ radial      Left Shoulder Exam     Skin  Rash:   None  Scars:   None    Inspection/Observation   Swelling:   none  Erythema:   none  Bruising:   none  Wounds:   none  Scapular Winging:  none  Scapular Dyskinesia:  none  Atrophy:   none  Masses:   None  Lymphadenopathy: None    Tenderness   AC Joint:   mild    Range of Motion   Active Forward Flexion:  160  Active External Rotation:  10  Active Internal Rotation:  Hip    Muscle Strength   Forward Flexion:  4+/5  External Rotation:  4+/5  Internal Rotation:  4+/5    Tests & Signs   Cross Arm:   positive  Impingement:   positive    Other   Sensation:   normal  Pulse:    2+ radial          Imaging: X-rays ordered and images interpreted today personally by me of both shoulder shows degenerative changes of the AC joint.         Assessment: Acute pain of both shoulders  -     Large Joint Aspiration/Injection: bilateral subacromial bursa        Plan:  He has bilateral shoulder pain for over a month.  He reports that they had an MRI of the right shoulder done but we do not  have any images or a report today.  We will try to obtain these for review.  In the meantime we will give him a injection in both shoulders today and a prescription for Pennsaid.  I will also refer move see my partner for possible nerve blocks to relief pain.  I will see him back in 4 weeks.      DISCLAIMER: This note may have been dictated using voice recognition software and may contain grammatical errors.     NOTE: Consult report sent to referring provider via [employer] EMR.

## 2020-12-28 NOTE — LETTER
Arrived from FP with complaint of low back pain December 28, 2020      Bruce Ramírez MD  [address]  [address]  [place] [address]           [place] [facility]  [address]  [place] [address]  Phone: [phone]  Fax: [phone]          Patient: Brandon Simmons   MR Number: [id number]   YOB: 1950   Date of Visit: 12/28/2020       Dear Dr. Bruce Ramírez:    Thank you for referring Brandon Simmons to me for evaluation. Attached you will find relevant portions of my assessment and plan of care.    If you have questions, please do not hesitate to call me. I look forward to following Brandon Simmons along with you.    Sincerely,    Lucien Ortega MD    Enclosure  CC:  No Recipients    If you would like to receive this communication electronically, please contact [email or web address] or [phone] to request more information on [employer] access.    For providers and/or their staff who would like to refer a patient to [facility], please contact us through our one-stop-shop provider referral line, [facility], at [phone].    If you feel you have received this communication in error or would no longer like to receive these types of communications, please e-mail [email or web address]

## 2020-12-28 NOTE — PROGRESS NOTES
Today's Date: 12/28/2020     Referring Provider: Dr. Bruce Ramírez     Chief Complaint:   Chief Complaint   Patient presents with    Shoulder Pain     bilat shoulder pain       HPI: Brandon Simmons is a 70 y.o. male  who presents today for evaluation treatment of bilateral shoulder pain.  He complains of pain over the greater than left lateral shoulders.  Pain is a constant waxing and waning dull achy pain.  Pain is worse with laying on his side at night, dressing, undressing, and overhead activity.  He has had a subacromial bursa injection which provided him with some relief in symptoms.  He is on chronic anticoagulation and on dialysis 3 times weekly for end-stage renal disease.  He also has a history of diabetes insulin dependence.    rReview of Systems   Constitutional: Negative for chills and fever.   HENT: Negative for congestion and tinnitus.    Eyes: Negative for blurred vision and photophobia.   Respiratory: Negative for shortness of breath and wheezing.    Cardiovascular: Negative for chest pain and palpitations.   Gastrointestinal: Negative for nausea and vomiting.   Genitourinary: Negative for dysuria and frequency.   Musculoskeletal: Positive for joint pain and myalgias.   Skin: Negative for itching and rash.   Neurological: Negative for tingling, sensory change, speech change and focal weakness.   Endo/Heme/Allergies: Negative for environmental allergies. Does not bruise/bleed easily.   Psychiatric/Behavioral: Negative for depression. The patient is not nervous/anxious.         Social History     Socioeconomic History    Marital status:      Spouse name: Not on file    Number of children: 3    Years of education: Not on file    Highest education level: Not on file   Occupational History    Occupation: disabled   Social Needs    Financial resource strain: Not on file    Food insecurity     Worry: Not on file     Inability: Not on file    Transportation needs     Medical: Not on file      Non-medical: Not on file   Tobacco Use    Smoking status: Former Smoker     Packs/day: 1.00     Years: 46.00     Pack years: 46.00     Types: Cigarettes     Quit date: 2017     Years since quitting: 3.9    Smokeless tobacco: Never Used    Tobacco comment: started smoking again April 2018- 5-6 CIGS PER DAY   Substance and Sexual Activity    Alcohol use: No     Comment: NOT ANY MORE, he used to be alcoholic for years    Drug use: No    Sexual activity: Not Currently   Lifestyle    Physical activity     Days per week: Not on file     Minutes per session: Not on file    Stress: Not on file   Relationships    Social connections     Talks on phone: Not on file     Gets together: Not on file     Attends Hindu service: Not on file     Active member of club or organization: Not on file     Attends meetings of clubs or organizations: Not on file     Relationship status: Not on file   Other Topics Concern    Not on file   Social History Narrative    Not on file       Family History   Problem Relation Age of Onset    Asthma Mother     COPD Mother     Stroke Father     Hypertension Father     Diabetes Sister     Heart disease Sister     Kidney disease Brother     Diabetes Brother     No Known Problems Brother     No Known Problems Brother     Hypertension Sister     No Known Problems Sister        Current Outpatient Medications on File Prior to Visit   Medication Sig Dispense Refill    amiodarone (PACERONE) 200 MG Tab Take 200 mg by mouth once daily.      amitriptyline (ELAVIL) 25 MG tablet Take 25 mg by mouth every evening.       amLODIPine (NORVASC) 10 MG tablet Take 15 mg by mouth once daily.      apixaban (ELIQUIS) 5 mg Tab Take 5 mg by mouth 2 (two) times daily.      aspirin (ECOTRIN) 81 MG EC tablet Take 81 mg by mouth once daily.      atorvastatin (LIPITOR) 40 MG tablet Take 40 mg by mouth every evening.      buPROPion (WELLBUTRIN SR) 100 MG TBSR 12 hr tablet Take 100 mg by mouth once  daily.      cholecalciferol, vitamin D3, 2,000 unit Tab Take 1 tablet by mouth 2 (two) times daily. D3      cilostazoL (PLETAL) 50 MG Tab Take 50 mg by mouth 2 (two) times daily.      clopidogreL (PLAVIX) 75 mg tablet Take 75 mg by mouth once daily.      diclofenac sodium (VOLTAREN ARTHRITIS PAIN) 1 % Gel Apply 2 g topically once daily. 1 Tube 0    HYDROcodone-acetaminophen (NORCO) 7.5-325 mg per tablet Take 1 tablet by mouth every 12 (twelve) hours as needed for Pain. 30 tablet 0    insulin detemir U-100 (LEVEMIR FLEXTOUCH) 100 unit/mL (3 mL) SubQ InPn pen Inject 20 Units into the skin 2 (two) times daily.      insulin glargine (LANTUS) 100 unit/mL injection Inject 22 Units into the skin every evening.      isosorbide mononitrate (IMDUR) 30 MG 24 hr tablet Take 30 mg by mouth once daily.      levothyroxine (SYNTHROID) 25 MCG tablet Take 25 mcg by mouth once daily.      lisinopril 10 MG tablet Take 10 mg by mouth once daily.      metoprolol succinate (TOPROL-XL) 100 MG 24 hr tablet Take 100 mg by mouth 2 (two) times daily.       NIFEdipine (ADALAT CC) 30 MG TbSR Take 30 mg by mouth once daily.      NOVOLOG U-100 INSULIN ASPART 100 unit/mL injection Inject 7 Units into the skin 3 (three) times daily with meals. Per sliding scale (Patient taking differently: Inject into the skin as needed (sliding scale). ) 10 mL 3    omeprazole (PRILOSEC) 20 MG capsule Take 20 mg by mouth once daily.      pyridoxine, vitamin B6, (B-6) 100 MG Tab Take 1 tablet by mouth once daily.      tamsulosin (FLOMAX) 0.4 mg Cap TAKE 1 CAPSULE BY MOUTH EVERY DAY 30 capsule 11    valsartan (DIOVAN) 320 MG tablet Take 320 mg by mouth once daily.       Current Facility-Administered Medications on File Prior to Visit   Medication Dose Route Frequency Provider Last Rate Last Dose    0.9%  NaCl infusion  20 mL/hr Intravenous Continuous José Miguel Urena MD 20 mL/hr at 06/26/19 0830 20 mL/hr at 06/26/19 0830    mupirocin 2 %  ointment   Nasal On Call Procedure Ramu Tripp MD            Review of patient's allergies indicates:   Allergen Reactions    Budesonide Rash and Other (See Comments)     Unknown    Cobalt Rash and Hives     Other reaction(s): Other (See Comments)  Unknown       Past Surgical History:   Procedure Laterality Date    AMPUTATION Right     BKA    ANGIOGRAPHY OF LOWER EXTREMITY Right 9/13/2018    Procedure: Angiogram Extremity Unilateral;  Surgeon: Hernando Fuentes MD;  Location: [facility] CATH;  Service: Cardiology;  Laterality: Right;    AV FISTULA PLACEMENT Right 6/26/2019    Procedure: CREATION, AV FISTULA- Graft ;  Surgeon: Ramu Tripp MD;  [facility] OR;  Service: Vascular;  Laterality: Right;    CARDIAC SURGERY  2000    CABG    CATARACT EXTRACTION      COLONOSCOPY N/A 10/16/2016    Procedure: COLONOSCOPY;  Surgeon: Jamar White MD;  Location: [facility] ENDO;  Service: Endoscopy;  Laterality: N/A;    CORONARY ARTERY BYPASS GRAFT  2000    GASTROSTOMY TUBE PLACEMENT      Aug 2017- PATIENT DENIES    HIP SURGERY N/A     with hardware, 2016, pt unsure which hip- PATIENT DENIES    LEG AMPUTATION THROUGH LOWER TIBIA AND FIBULA      right leg - prosthesis in place    PERCUTANEOUS TRANSLUMINAL ANGIOPLASTY (PTA) OF PERIPHERAL VESSEL Right 9/13/2018    Procedure: PTA, PERIPHERAL BLOOD VESSEL;  Surgeon: Hernando Fuentes MD;  Location: [facility] CATH;  Service: Cardiology;  Laterality: Right;    PERCUTANEOUS TRANSLUMINAL ANGIOPLASTY (PTA) OF PERIPHERAL VESSEL N/A 2/20/2020    Procedure: PTA, PERIPHERAL VESSEL;  Surgeon: Hernando Fuentes MD;  Location: [facility] CATH;  Service: Cardiology;  Laterality: N/A;    stents  left leg       Past Medical History:   Diagnosis Date    Anticoagulant long-term use     Arthritis     Bleeding ulcer     Blood transfusion     Carotid artery disease     CHF (congestive heart failure)     CKD (chronic kidney disease) stage 3, GFR 30-59 ml/min     Coronary artery disease      Diabetes mellitus     Diabetes mellitus, type 2     Hx of BKA, right     prosthetic leg    Hyperlipidemia     Hypertension     Myocardial infarction     Oxygen dependent     1L NC    PAD (peripheral artery disease)     Renal disorder     Short-term memory loss     Stroke     short term memory loss    Thyroid disease        Vitals:    12/28/20 1104   BP: (!) 141/67   Pulse: 101       Physical Exam  Constitutional:       General: He is not in acute distress.     Appearance: Normal appearance.   HENT:      Head: Normocephalic and atraumatic.      Nose: Nose normal. No congestion.      Mouth/Throat:      Mouth: Mucous membranes are moist.      Pharynx: Oropharynx is clear.   Eyes:      Extraocular Movements: Extraocular movements intact.      Pupils: Pupils are equal, round, and reactive to light.   Neck:      Trachea: Trachea and phonation normal.   Cardiovascular:      Comments: AV fistula right upper extremity  Pulmonary:      Effort: Pulmonary effort is normal. No respiratory distress.   Abdominal:      General: Abdomen is flat. There is no distension.   Skin:     General: Skin is warm and dry.   Neurological:      General: No focal deficit present.      Mental Status: He is alert.      Cranial Nerves: Cranial nerves are intact.      Sensory: Sensation is intact.      Motor: Motor function is intact.      Gait: Gait abnormal (Walks with a Rollator.  Noticeably antalgic gait).   Psychiatric:         Mood and Affect: Mood and affect normal.         Behavior: Behavior normal.        Right Shoulder Exam     Range of Motion   Active abduction: 90     Muscle Strength   Abduction: 4/5   Internal rotation: 5/5   External rotation: 5/5   Biceps: 5/5     Tests   Lipscomb test: positive  Impingement: positive  Drop arm: negative    Comments:  Positive empty can      Left Shoulder Exam     Range of Motion   Active abduction: 90     Muscle Strength   Abduction: 4/5   Internal rotation: 5/5   External rotation: 5/5    Biceps: 5/5     Tests   Lipscomb test: positive  Impingement: positive  Drop arm: negative    Comments:  Positive empty can             EXAMINATION:  XR SHOULDER TRAUMA 3 VIEW BILATERAL     CLINICAL HISTORY:  Pain in right shoulder     TECHNIQUE:  Three views of each shoulder were performed.     COMPARISON:  None     FINDINGS:  There is no evidence fracture or malalignment.  There are degenerative changes of both shoulders.  This is greater on the left than right.  The patient has a proximal right venous stent.  There are vascular calcifications of the arteries seen on the right.     Impression:     There are degenerative changes of both shoulders greater on the left than right.    Chronic pain of both shoulders    Tendinopathy of rotator cuff, unspecified laterality    End stage renal disease    Essential hypertension    PVD (peripheral vascular disease)    Diabetic polyneuropathy associated with type 1 diabetes mellitus    Impaired functional mobility and activity tolerance    S/P BKA (below knee amputation) unilateral    Impingement syndrome of shoulder region, unspecified laterality           PLAN:   Brandon Jinsin is a 70 y.o. male with chronic bilateral right greater than left shoulder pain.  History, physical examination, x-ray, diagnostic ultrasound is consistent with rotator cuff tendinopathy, impingement syndrome as well as likely glenohumeral arthritis.  He has had subacromial bursa injections which provided him with some relief in symptoms.  I performed a diagnostic ultrasound examination of the right shoulder today which shows right supraspinatus tendinopathy.  There is no evidence of full-thickness tears retractions.  At this time, I recommend proceeding with bilateral suprascapular nerve blocks and hydrodissection without steroid.  Risks, benefits alternatives were discussed.  Patient will be scheduled the next available appointment        Lucien Ortega D.O.  Physical Medicine and  Rehabilitation          CC: Patients PCP: Jd Mayo MD  CC: Referring Provider: Dr. Bruce Ramírez

## 2020-12-28 NOTE — PROCEDURES
Procedures     Complete diagnostic ultrasound examination of the right shoulder    Indications:  Right shoulder pain, positive empty can, positive impingement    Findings:  Using a linear transducer, the right shoulder was evaluated.  Anteriorly, long head of the biceps tendon appeared normal.  There is trace fluid noted within the biceps tendon sheath.  The subscapularis tendon appeared normal at the insertion on the lesser tuberosity.  The right shoulder was then placed in the modified crass position.  The anterior leading edge of the supraspinatus appeared heterogenous.  There is multiple areas of cortical irregularities at the insertion.  Posteriorly, the infraspinatus and teres minor tendons appear normal.  The visualized portion of the posterior glenoid labrum appeared heterogenous.    Impression:  1.  Right supraspinatus tendinopathy, partial-thickness tears without evidence of retraction  2.  Trace fluid within the biceps tendon sheath with may be physiologic versus indicating intra-articular pathology

## 2020-12-30 PROBLEM — Z71.89 ACP (ADVANCE CARE PLANNING): Status: ACTIVE | Noted: 2020-01-01

## 2020-12-30 PROBLEM — J96.00 ACUTE RESPIRATORY FAILURE: Status: ACTIVE | Noted: 2020-01-01

## 2020-12-31 PROBLEM — J18.9 PNEUMONIA OF LOWER LOBE OF LUNG: Status: ACTIVE | Noted: 2020-01-01

## 2021-01-02 PROBLEM — R57.9 SHOCK CIRCULATORY: Status: ACTIVE | Noted: 2021-01-01

## 2021-01-02 PROBLEM — J96.01 ACUTE RESPIRATORY FAILURE WITH HYPOXIA: Status: ACTIVE | Noted: 2020-01-01

## 2021-01-03 PROBLEM — I10 ESSENTIAL HYPERTENSION: Status: RESOLVED | Noted: 2017-08-19 | Resolved: 2021-01-01

## 2021-01-03 PROBLEM — E27.40 ADRENAL INSUFFICIENCY: Status: ACTIVE | Noted: 2021-01-01

## 2021-01-05 PROBLEM — E43 SEVERE MALNUTRITION: Status: ACTIVE | Noted: 2021-01-01

## 2021-01-06 PROBLEM — R53.1 GENERALIZED WEAKNESS: Status: ACTIVE | Noted: 2021-01-01

## 2021-01-25 PROBLEM — I70.203 ATHEROSCLEROSIS OF NATIVE ARTERY OF BOTH LOWER EXTREMITIES: Status: ACTIVE | Noted: 2021-01-01

## 2021-01-26 PROBLEM — R57.9 SHOCK: Status: ACTIVE | Noted: 2021-01-01

## 2021-01-27 PROBLEM — Z87.11 HISTORY OF GASTRIC ULCER: Status: ACTIVE | Noted: 2021-01-01

## 2021-01-27 PROBLEM — R63.8 ALTERATION IN NUTRITION: Status: ACTIVE | Noted: 2021-01-01

## 2021-01-28 PROBLEM — D69.6 THROMBOCYTOPENIA: Status: ACTIVE | Noted: 2021-01-01

## 2021-01-29 PROBLEM — K92.1 GASTROINTESTINAL HEMORRHAGE WITH MELENA: Status: ACTIVE | Noted: 2021-01-01

## 2021-02-01 PROBLEM — J90 PLEURAL EFFUSION: Status: ACTIVE | Noted: 2021-01-01
